# Patient Record
Sex: FEMALE | Race: WHITE | NOT HISPANIC OR LATINO | Employment: UNEMPLOYED | URBAN - METROPOLITAN AREA
[De-identification: names, ages, dates, MRNs, and addresses within clinical notes are randomized per-mention and may not be internally consistent; named-entity substitution may affect disease eponyms.]

---

## 2018-09-14 ENCOUNTER — HOSPITAL ENCOUNTER (EMERGENCY)
Facility: HOSPITAL | Age: 78
Discharge: NURSING, REHAB OR LONG-TERM CARE | End: 2018-09-14
Attending: EMERGENCY MEDICINE
Payer: MEDICARE

## 2018-09-14 VITALS
HEART RATE: 66 BPM | DIASTOLIC BLOOD PRESSURE: 58 MMHG | SYSTOLIC BLOOD PRESSURE: 106 MMHG | RESPIRATION RATE: 18 BRPM | WEIGHT: 173.81 LBS | BODY MASS INDEX: 24.88 KG/M2 | HEIGHT: 70 IN | OXYGEN SATURATION: 98 % | TEMPERATURE: 98 F

## 2018-09-14 DIAGNOSIS — M54.9 CHRONIC BILATERAL BACK PAIN, UNSPECIFIED BACK LOCATION: Primary | ICD-10-CM

## 2018-09-14 DIAGNOSIS — G89.29 CHRONIC BILATERAL BACK PAIN, UNSPECIFIED BACK LOCATION: Primary | ICD-10-CM

## 2018-09-14 DIAGNOSIS — R40.20 LOSS OF CONSCIOUSNESS: ICD-10-CM

## 2018-09-14 LAB
ALBUMIN SERPL BCP-MCNC: 3.2 G/DL
ALP SERPL-CCNC: 78 U/L
ALT SERPL W/O P-5'-P-CCNC: 12 U/L
ANION GAP SERPL CALC-SCNC: 7 MMOL/L
APTT BLDCRRT: 26.1 SEC
AST SERPL-CCNC: 19 U/L
BASOPHILS # BLD AUTO: 0.02 K/UL
BASOPHILS NFR BLD: 0.3 %
BILIRUB SERPL-MCNC: 0.6 MG/DL
BILIRUB UR QL STRIP: NEGATIVE
BUN SERPL-MCNC: 16 MG/DL
CALCIUM SERPL-MCNC: 9.9 MG/DL
CHLORIDE SERPL-SCNC: 100 MMOL/L
CK SERPL-CCNC: 24 U/L
CLARITY UR: CLEAR
CO2 SERPL-SCNC: 33 MMOL/L
COLOR UR: YELLOW
CREAT SERPL-MCNC: 1 MG/DL
DIFFERENTIAL METHOD: ABNORMAL
EOSINOPHIL # BLD AUTO: 0.2 K/UL
EOSINOPHIL NFR BLD: 3 %
ERYTHROCYTE [DISTWIDTH] IN BLOOD BY AUTOMATED COUNT: 14 %
EST. GFR  (AFRICAN AMERICAN): >60 ML/MIN/1.73 M^2
EST. GFR  (NON AFRICAN AMERICAN): 55 ML/MIN/1.73 M^2
GLUCOSE SERPL-MCNC: 142 MG/DL
GLUCOSE UR QL STRIP: NEGATIVE
HCT VFR BLD AUTO: 36.2 %
HGB BLD-MCNC: 11.3 G/DL
HGB UR QL STRIP: NEGATIVE
INR PPP: 1
KETONES UR QL STRIP: NEGATIVE
LEUKOCYTE ESTERASE UR QL STRIP: NEGATIVE
LYMPHOCYTES # BLD AUTO: 1.6 K/UL
LYMPHOCYTES NFR BLD: 26.7 %
MCH RBC QN AUTO: 29.7 PG
MCHC RBC AUTO-ENTMCNC: 31.2 G/DL
MCV RBC AUTO: 95 FL
MONOCYTES # BLD AUTO: 0.7 K/UL
MONOCYTES NFR BLD: 11.9 %
NEUTROPHILS # BLD AUTO: 3.5 K/UL
NEUTROPHILS NFR BLD: 57.8 %
NITRITE UR QL STRIP: NEGATIVE
PH UR STRIP: 7 [PH] (ref 5–8)
PLATELET # BLD AUTO: 182 K/UL
PMV BLD AUTO: 11.2 FL
POCT GLUCOSE: 142 MG/DL (ref 70–110)
POTASSIUM SERPL-SCNC: 4.7 MMOL/L
PROT SERPL-MCNC: 6.6 G/DL
PROT UR QL STRIP: ABNORMAL
PROTHROMBIN TIME: 10.9 SEC
RBC # BLD AUTO: 3.81 M/UL
SODIUM SERPL-SCNC: 140 MMOL/L
SP GR UR STRIP: 1.01 (ref 1–1.03)
TROPONIN I SERPL DL<=0.01 NG/ML-MCNC: 0.01 NG/ML
TROPONIN I SERPL DL<=0.01 NG/ML-MCNC: 0.01 NG/ML
URN SPEC COLLECT METH UR: ABNORMAL
UROBILINOGEN UR STRIP-ACNC: NEGATIVE EU/DL
WBC # BLD AUTO: 6.03 K/UL

## 2018-09-14 PROCEDURE — 85610 PROTHROMBIN TIME: CPT

## 2018-09-14 PROCEDURE — 85025 COMPLETE CBC W/AUTO DIFF WBC: CPT

## 2018-09-14 PROCEDURE — 84484 ASSAY OF TROPONIN QUANT: CPT

## 2018-09-14 PROCEDURE — 99284 EMERGENCY DEPT VISIT MOD MDM: CPT | Mod: 25

## 2018-09-14 PROCEDURE — 96375 TX/PRO/DX INJ NEW DRUG ADDON: CPT

## 2018-09-14 PROCEDURE — 96374 THER/PROPH/DIAG INJ IV PUSH: CPT

## 2018-09-14 PROCEDURE — 63600175 PHARM REV CODE 636 W HCPCS: Performed by: EMERGENCY MEDICINE

## 2018-09-14 PROCEDURE — 82962 GLUCOSE BLOOD TEST: CPT

## 2018-09-14 PROCEDURE — 85730 THROMBOPLASTIN TIME PARTIAL: CPT

## 2018-09-14 PROCEDURE — 25000003 PHARM REV CODE 250: Performed by: EMERGENCY MEDICINE

## 2018-09-14 PROCEDURE — 93005 ELECTROCARDIOGRAM TRACING: CPT

## 2018-09-14 PROCEDURE — 80053 COMPREHEN METABOLIC PANEL: CPT

## 2018-09-14 PROCEDURE — 93010 ELECTROCARDIOGRAM REPORT: CPT | Mod: ,,, | Performed by: INTERNAL MEDICINE

## 2018-09-14 PROCEDURE — 81003 URINALYSIS AUTO W/O SCOPE: CPT

## 2018-09-14 PROCEDURE — 82550 ASSAY OF CK (CPK): CPT

## 2018-09-14 RX ORDER — ONDANSETRON 2 MG/ML
4 INJECTION INTRAMUSCULAR; INTRAVENOUS
Status: COMPLETED | OUTPATIENT
Start: 2018-09-14 | End: 2018-09-14

## 2018-09-14 RX ORDER — TIZANIDINE 2 MG/1
4 TABLET ORAL 3 TIMES DAILY
COMMUNITY
End: 2018-10-27

## 2018-09-14 RX ORDER — LISINOPRIL 20 MG/1
20 TABLET ORAL DAILY
Status: ON HOLD | COMMUNITY
End: 2018-10-21 | Stop reason: HOSPADM

## 2018-09-14 RX ORDER — CARTEOLOL HYDROCHLORIDE 10 MG/ML
1 SOLUTION OPHTHALMIC 2 TIMES DAILY
COMMUNITY

## 2018-09-14 RX ORDER — TALC
POWDER (GRAM) TOPICAL NIGHTLY
COMMUNITY

## 2018-09-14 RX ORDER — CETIRIZINE HYDROCHLORIDE 10 MG/1
10 TABLET ORAL NIGHTLY
Status: ON HOLD | COMMUNITY
End: 2022-01-01

## 2018-09-14 RX ORDER — FLUTICASONE PROPIONATE 50 MCG
1 SPRAY, SUSPENSION (ML) NASAL NIGHTLY
Status: ON HOLD | COMMUNITY
End: 2022-01-01

## 2018-09-14 RX ORDER — RISPERIDONE 1 MG/1
1 TABLET ORAL NIGHTLY
COMMUNITY
End: 2019-04-13 | Stop reason: DRUGHIGH

## 2018-09-14 RX ORDER — PRAVASTATIN SODIUM 20 MG/1
20 TABLET ORAL NIGHTLY
COMMUNITY
End: 2018-09-21

## 2018-09-14 RX ORDER — DIVALPROEX SODIUM 500 MG/1
500 TABLET, DELAYED RELEASE ORAL NIGHTLY
COMMUNITY
End: 2019-04-13 | Stop reason: DRUGHIGH

## 2018-09-14 RX ORDER — KETOROLAC TROMETHAMINE 30 MG/ML
15 INJECTION, SOLUTION INTRAMUSCULAR; INTRAVENOUS
Status: COMPLETED | OUTPATIENT
Start: 2018-09-14 | End: 2018-09-14

## 2018-09-14 RX ORDER — ASPIRIN 81 MG/1
81 TABLET ORAL DAILY
COMMUNITY

## 2018-09-14 RX ORDER — PNV NO.95/FERROUS FUM/FOLIC AC 28MG-0.8MG
100 TABLET ORAL DAILY
COMMUNITY

## 2018-09-14 RX ORDER — MORPHINE SULFATE 4 MG/ML
4 INJECTION, SOLUTION INTRAMUSCULAR; INTRAVENOUS
Status: COMPLETED | OUTPATIENT
Start: 2018-09-14 | End: 2018-09-14

## 2018-09-14 RX ORDER — MEMANTINE HYDROCHLORIDE 5 MG/1
5 TABLET ORAL 2 TIMES DAILY
COMMUNITY

## 2018-09-14 RX ORDER — LATANOPROST 50 UG/ML
1 SOLUTION/ DROPS OPHTHALMIC NIGHTLY
COMMUNITY

## 2018-09-14 RX ORDER — HYDROCODONE BITARTRATE AND ACETAMINOPHEN 5; 325 MG/1; MG/1
1 TABLET ORAL 3 TIMES DAILY PRN
Status: ON HOLD | COMMUNITY
End: 2022-01-01

## 2018-09-14 RX ORDER — DOCUSATE SODIUM 100 MG/1
100 CAPSULE, LIQUID FILLED ORAL DAILY PRN
Status: ON HOLD | COMMUNITY
End: 2022-01-01

## 2018-09-14 RX ORDER — TRAZODONE HYDROCHLORIDE 50 MG/1
25 TABLET ORAL NIGHTLY
Status: ON HOLD | COMMUNITY
End: 2022-01-01 | Stop reason: SDUPTHER

## 2018-09-14 RX ORDER — DIVALPROEX SODIUM 250 MG/1
250 TABLET, DELAYED RELEASE ORAL 2 TIMES DAILY
Status: ON HOLD | COMMUNITY
End: 2022-01-01

## 2018-09-14 RX ORDER — MULTIVITAMIN
1 TABLET ORAL DAILY
COMMUNITY

## 2018-09-14 RX ORDER — ESTRADIOL 0.1 MG/G
1 CREAM VAGINAL
Status: ON HOLD | COMMUNITY
End: 2022-01-01

## 2018-09-14 RX ADMIN — MORPHINE SULFATE 4 MG: 4 INJECTION INTRAVENOUS at 01:09

## 2018-09-14 RX ADMIN — KETOROLAC TROMETHAMINE 15 MG: 30 INJECTION, SOLUTION INTRAMUSCULAR at 11:09

## 2018-09-14 RX ADMIN — SODIUM CHLORIDE 1000 ML: 0.9 INJECTION, SOLUTION INTRAVENOUS at 10:09

## 2018-09-14 RX ADMIN — ONDANSETRON 4 MG: 2 INJECTION INTRAMUSCULAR; INTRAVENOUS at 01:09

## 2018-09-14 NOTE — ED PROVIDER NOTES
Encounter Date: 9/14/2018       History     Chief Complaint   Patient presents with    Loss of Consciousness     pt lost consciousness while in the dining room at [facility]. CPR was started. When EMS arrived pt was arousable to tactile stimuli and had been incontinent of urine. C/o generalized body aches     78-year-old female brought to the emergency department by EMS.  [facility] reports that the patient was a code, as they could not find a pulse or respirations.  EMS reports that on their arrival, the patient was arousable to stimulation.  Patient is only complaining of her chronic neck and back pain.  She is unclear on the events of this morning.  She apparently passed out lost her pulse in the dining room this morning.  It is unclear the extent to which she actually received good CPR.  Nonetheless, she is awake and alert and oriented on arrival and only complains of her chronic aching neck and back pain, worse with movement without alleviating factors.          Review of patient's allergies indicates:  No Known Allergies  Past Medical History:   Diagnosis Date    Anemia     Bipolar disorder     Chronic pain     Dementia     Depression     Extrapyramidal and movement disorder     Glaucoma     Hyperlipidemia     Hypertension     Scabies 05/05/2017     No past surgical history on file.  No family history on file.  Social History     Tobacco Use    Smoking status: Not on file   Substance Use Topics    Alcohol use: Not on file    Drug use: Not on file     Review of Systems   Constitutional: Negative for chills, fatigue and fever.   HENT: Negative for congestion, sore throat and voice change.    Eyes: Negative for photophobia, pain and redness.   Respiratory: Negative for cough and choking.    Cardiovascular: Negative for chest pain, palpitations and leg swelling.   Gastrointestinal: Negative for abdominal pain, diarrhea, nausea and vomiting.   Genitourinary: Negative for dysuria,  frequency and urgency.   Musculoskeletal: Positive for back pain and neck pain. Negative for neck stiffness.   Neurological: Negative for seizures, speech difficulty, numbness and headaches.   All other systems reviewed and are negative.      Physical Exam     Initial Vitals [09/14/18 0941]   BP Pulse Resp Temp SpO2   (!) 86/48 61 15 97.9 °F (36.6 °C) 96 %      MAP       --         Physical Exam    Nursing note and vitals reviewed.  Constitutional: She appears well-developed and well-nourished. No distress.   HENT:   Head: Normocephalic and atraumatic.   Oropharynx clear; dry mucous membranes   Eyes: Conjunctivae and EOM are normal. Pupils are equal, round, and reactive to light.   Neck: Normal range of motion. Neck supple. No tracheal deviation present.   Cardiovascular: Normal rate, regular rhythm, normal heart sounds and intact distal pulses.   Pulmonary/Chest: Breath sounds normal. No respiratory distress. She has no wheezes. She has no rhonchi. She has no rales.   Abdominal: Soft. Bowel sounds are normal. She exhibits no distension. There is no tenderness.   Musculoskeletal: Normal range of motion. She exhibits tenderness (Diffuse neck and back tenderness, similar to baseline per patient, no point or bony tenderness). She exhibits no edema.   Neurological: She is alert and oriented to person, place, and time. She has normal strength. No cranial nerve deficit or sensory deficit. GCS score is 15. GCS eye subscore is 4. GCS verbal subscore is 5. GCS motor subscore is 6.   Skin: Skin is warm and dry. Capillary refill takes less than 2 seconds.         ED Course   Procedures  Labs Reviewed   CBC W/ AUTO DIFFERENTIAL - Abnormal; Notable for the following components:       Result Value    RBC 3.81 (*)     Hemoglobin 11.3 (*)     Hematocrit 36.2 (*)     MCHC 31.2 (*)     All other components within normal limits   POCT GLUCOSE - Abnormal; Notable for the following components:    POCT Glucose 142 (*)     All other  components within normal limits   COMPREHENSIVE METABOLIC PANEL   URINALYSIS   PROTIME-INR   APTT   TROPONIN I   CK   POCT GLUCOSE MONITORING CONTINUOUS     EKG Readings: (Independently Interpreted)   Initial Reading: No STEMI. Previous EKG Date: No prior for comparison. Rhythm: Normal Sinus Rhythm. Heart Rate: 61. Ectopy: No Ectopy. Conduction: Normal. ST Segments: Normal ST Segments. T Waves: Normal. Axis: Normal.         X-Rays:   Independently Interpreted Readings:   Chest X-Ray: Chest x-ray interpreted by radiologist and visualized by me:      Imaging Results          X-Ray Chest AP Portable (Final result)  Result time 09/14/18 11:04:31    Final result by Armando Holland MD (09/14/18 11:04:31)                 Impression:      Mild cardiomegaly and interstitial edema.      Electronically signed by: Armando Holland MD  Date:    09/14/2018  Time:    11:04             Narrative:    EXAMINATION:  XR CHEST AP PORTABLE    CLINICAL HISTORY:  Loss of consciousness;    TECHNIQUE:  Single frontal view of the chest was performed.    COMPARISON:  None    FINDINGS:  Cardiac silhouette slightly enlarged.  Mild interstitial prominence.  Low lung volumes.  No focal consolidation.  No effusion or pneumothorax.  No acute osseous findings                                Medical Decision Making:   Initial Assessment:   78-year-old female presents emergency department for cardiac arrest versus syncope  Differential Diagnosis:   ACS, dissection, pneumonia, sepsis, UTI, dehydration, electrolyte dyscrasias, arrhythmia  Independently Interpreted Test(s):   I have ordered and independently interpreted X-rays - see prior notes.  I have ordered and independently interpreted EKG Reading(s) - see prior notes  Clinical Tests:   Lab Tests: Reviewed       <> Summary of Lab: Benign  ED Management:  Patient given IV fluid, vital signs have improved.  Given some medicine for pain and reports improvement.  Her evaluation is much more consistent  with syncope.  Informed her of results as well as plan to discharge and patient is comfortable with discharge at this time.                      Clinical Impression:   The primary encounter diagnosis was Chronic bilateral back pain, unspecified back location. A diagnosis of Loss of consciousness was also pertinent to this visit.      Disposition:   Disposition: Discharged  Condition: Stable                        Barney Fields MD  09/14/18 5366

## 2018-09-14 NOTE — ED TRIAGE NOTES
Pt arrived via  EMS from [facility] after witnessed syncopal episode while eating.  CPR started by staff d/t inability to find pulse.  Upon EMS arrival pt alert and responsive to touch, was incontinent of urine during episode.

## 2018-09-20 ENCOUNTER — HOSPITAL ENCOUNTER (INPATIENT)
Facility: HOSPITAL | Age: 78
LOS: 3 days | Discharge: HOME | DRG: 871 | End: 2018-09-24
Attending: EMERGENCY MEDICINE | Admitting: HOSPITALIST
Payer: MEDICARE

## 2018-09-20 DIAGNOSIS — K80.20 CALCULUS OF GALLBLADDER WITHOUT CHOLECYSTITIS WITHOUT OBSTRUCTION: ICD-10-CM

## 2018-09-20 DIAGNOSIS — N12 PYELONEPHRITIS: ICD-10-CM

## 2018-09-20 DIAGNOSIS — A41.9 SEPSIS, DUE TO UNSPECIFIED ORGANISM: ICD-10-CM

## 2018-09-20 DIAGNOSIS — N39.0 URINARY TRACT INFECTION WITH HEMATURIA, SITE UNSPECIFIED: Primary | ICD-10-CM

## 2018-09-20 DIAGNOSIS — B96.20 E. COLI PYELONEPHRITIS: ICD-10-CM

## 2018-09-20 DIAGNOSIS — R41.82 MENTAL STATUS CHANGE: ICD-10-CM

## 2018-09-20 DIAGNOSIS — R31.9 URINARY TRACT INFECTION WITH HEMATURIA, SITE UNSPECIFIED: Primary | ICD-10-CM

## 2018-09-20 DIAGNOSIS — N12 E. COLI PYELONEPHRITIS: ICD-10-CM

## 2018-09-20 LAB
ALBUMIN SERPL BCP-MCNC: 3.1 G/DL
ALP SERPL-CCNC: 88 U/L
ALT SERPL W/O P-5'-P-CCNC: 18 U/L
ANION GAP SERPL CALC-SCNC: 11 MMOL/L
ANISOCYTOSIS BLD QL SMEAR: SLIGHT
AST SERPL-CCNC: 23 U/L
BACTERIA #/AREA URNS HPF: ABNORMAL /HPF
BASOPHILS # BLD AUTO: ABNORMAL K/UL
BASOPHILS NFR BLD: 0 %
BILIRUB SERPL-MCNC: 1 MG/DL
BILIRUB UR QL STRIP: NEGATIVE
BUN SERPL-MCNC: 26 MG/DL
CALCIUM SERPL-MCNC: 10.1 MG/DL
CHLORIDE SERPL-SCNC: 97 MMOL/L
CLARITY UR: ABNORMAL
CO2 SERPL-SCNC: 29 MMOL/L
COLOR UR: YELLOW
CREAT SERPL-MCNC: 1.2 MG/DL
DIFFERENTIAL METHOD: ABNORMAL
EOSINOPHIL # BLD AUTO: ABNORMAL K/UL
EOSINOPHIL NFR BLD: 2 %
ERYTHROCYTE [DISTWIDTH] IN BLOOD BY AUTOMATED COUNT: 13.9 %
EST. GFR  (AFRICAN AMERICAN): 51 ML/MIN/1.73 M^2
EST. GFR  (NON AFRICAN AMERICAN): 44 ML/MIN/1.73 M^2
GLUCOSE SERPL-MCNC: 129 MG/DL
GLUCOSE UR QL STRIP: NEGATIVE
HCT VFR BLD AUTO: 38.9 %
HGB BLD-MCNC: 12.6 G/DL
HGB UR QL STRIP: ABNORMAL
HYALINE CASTS #/AREA URNS LPF: 0 /LPF
KETONES UR QL STRIP: ABNORMAL
LACTATE SERPL-SCNC: 1.1 MMOL/L
LEUKOCYTE ESTERASE UR QL STRIP: ABNORMAL
LYMPHOCYTES # BLD AUTO: ABNORMAL K/UL
LYMPHOCYTES NFR BLD: 10 %
MCH RBC QN AUTO: 29.9 PG
MCHC RBC AUTO-ENTMCNC: 32.4 G/DL
MCV RBC AUTO: 92 FL
MICROSCOPIC COMMENT: ABNORMAL
MONOCYTES # BLD AUTO: ABNORMAL K/UL
MONOCYTES NFR BLD: 11 %
NEUTROPHILS NFR BLD: 66 %
NEUTS BAND NFR BLD MANUAL: 11 %
NITRITE UR QL STRIP: POSITIVE
PH UR STRIP: 6 [PH] (ref 5–8)
PLATELET # BLD AUTO: 215 K/UL
PLATELET BLD QL SMEAR: ABNORMAL
PMV BLD AUTO: 10.9 FL
POTASSIUM SERPL-SCNC: 4.2 MMOL/L
PROT SERPL-MCNC: 7.3 G/DL
PROT UR QL STRIP: ABNORMAL
RBC # BLD AUTO: 4.21 M/UL
RBC #/AREA URNS HPF: >100 /HPF (ref 0–4)
SODIUM SERPL-SCNC: 137 MMOL/L
SP GR UR STRIP: 1.02 (ref 1–1.03)
SQUAMOUS #/AREA URNS HPF: 2 /HPF
URN SPEC COLLECT METH UR: ABNORMAL
UROBILINOGEN UR STRIP-ACNC: NEGATIVE EU/DL
WBC # BLD AUTO: 15.53 K/UL
WBC #/AREA URNS HPF: 50 /HPF (ref 0–5)
WBC CLUMPS URNS QL MICRO: ABNORMAL

## 2018-09-20 PROCEDURE — 96375 TX/PRO/DX INJ NEW DRUG ADDON: CPT

## 2018-09-20 PROCEDURE — 80053 COMPREHEN METABOLIC PANEL: CPT

## 2018-09-20 PROCEDURE — 87077 CULTURE AEROBIC IDENTIFY: CPT | Mod: 59

## 2018-09-20 PROCEDURE — 87186 SC STD MICRODIL/AGAR DIL: CPT | Mod: 59

## 2018-09-20 PROCEDURE — 85027 COMPLETE CBC AUTOMATED: CPT

## 2018-09-20 PROCEDURE — 87040 BLOOD CULTURE FOR BACTERIA: CPT

## 2018-09-20 PROCEDURE — 85007 BL SMEAR W/DIFF WBC COUNT: CPT

## 2018-09-20 PROCEDURE — 63600175 PHARM REV CODE 636 W HCPCS: Performed by: EMERGENCY MEDICINE

## 2018-09-20 PROCEDURE — 96361 HYDRATE IV INFUSION ADD-ON: CPT

## 2018-09-20 PROCEDURE — 96365 THER/PROPH/DIAG IV INF INIT: CPT

## 2018-09-20 PROCEDURE — 87086 URINE CULTURE/COLONY COUNT: CPT

## 2018-09-20 PROCEDURE — 81000 URINALYSIS NONAUTO W/SCOPE: CPT

## 2018-09-20 PROCEDURE — 25000003 PHARM REV CODE 250: Performed by: EMERGENCY MEDICINE

## 2018-09-20 PROCEDURE — 25500020 PHARM REV CODE 255: Performed by: EMERGENCY MEDICINE

## 2018-09-20 PROCEDURE — 83605 ASSAY OF LACTIC ACID: CPT

## 2018-09-20 PROCEDURE — 87088 URINE BACTERIA CULTURE: CPT

## 2018-09-20 PROCEDURE — 99285 EMERGENCY DEPT VISIT HI MDM: CPT | Mod: 25

## 2018-09-20 RX ADMIN — SODIUM CHLORIDE 2355 ML: 0.9 INJECTION, SOLUTION INTRAVENOUS at 09:09

## 2018-09-20 RX ADMIN — IOHEXOL 75 ML: 350 INJECTION, SOLUTION INTRAVENOUS at 10:09

## 2018-09-20 RX ADMIN — CEFTRIAXONE 1 G: 1 INJECTION, SOLUTION INTRAVENOUS at 10:09

## 2018-09-21 PROBLEM — I10 ESSENTIAL HYPERTENSION: Status: ACTIVE | Noted: 2018-09-21

## 2018-09-21 PROBLEM — G89.29 CHRONIC PAIN: Status: ACTIVE | Noted: 2018-09-21

## 2018-09-21 PROBLEM — I10 ESSENTIAL HYPERTENSION: Chronic | Status: ACTIVE | Noted: 2018-09-21

## 2018-09-21 PROBLEM — F03.90 DEMENTIA WITHOUT BEHAVIORAL DISTURBANCE: Status: ACTIVE | Noted: 2018-09-21

## 2018-09-21 PROBLEM — F03.90 DEMENTIA WITHOUT BEHAVIORAL DISTURBANCE: Chronic | Status: ACTIVE | Noted: 2018-09-21

## 2018-09-21 PROBLEM — F32.9 MAJOR DEPRESSION: Status: ACTIVE | Noted: 2018-09-21

## 2018-09-21 PROBLEM — A41.9 SEVERE SEPSIS: Status: ACTIVE | Noted: 2018-09-21

## 2018-09-21 PROBLEM — F31.9 BIPOLAR AFFECTIVE DISORDER: Status: ACTIVE | Noted: 2018-09-21

## 2018-09-21 PROBLEM — F31.9 BIPOLAR AFFECTIVE DISORDER: Chronic | Status: ACTIVE | Noted: 2018-09-21

## 2018-09-21 PROBLEM — R78.81 GRAM-NEGATIVE BACTEREMIA: Status: ACTIVE | Noted: 2018-09-21

## 2018-09-21 PROBLEM — R65.20 SEVERE SEPSIS: Status: ACTIVE | Noted: 2018-09-21

## 2018-09-21 PROBLEM — R53.81 DEBILITY: Status: ACTIVE | Noted: 2018-09-21

## 2018-09-21 PROBLEM — Z99.3 WHEELCHAIR DEPENDENT: Chronic | Status: ACTIVE | Noted: 2018-09-21

## 2018-09-21 PROBLEM — G93.41 SEPTIC ENCEPHALOPATHY: Status: ACTIVE | Noted: 2018-09-21

## 2018-09-21 PROBLEM — N20.0 RIGHT NEPHROLITHIASIS: Status: ACTIVE | Noted: 2018-09-21

## 2018-09-21 PROBLEM — G89.29 CHRONIC PAIN: Chronic | Status: ACTIVE | Noted: 2018-09-21

## 2018-09-21 PROBLEM — N12 PYELONEPHRITIS: Status: ACTIVE | Noted: 2018-09-21

## 2018-09-21 LAB
ALLENS TEST: ABNORMAL
ANION GAP SERPL CALC-SCNC: 9 MMOL/L
ANISOCYTOSIS BLD QL SMEAR: SLIGHT
APTT BLDCRRT: 29.8 SEC
BASOPHILS # BLD AUTO: ABNORMAL K/UL
BASOPHILS NFR BLD: 0 %
BUN SERPL-MCNC: 22 MG/DL
CALCIUM SERPL-MCNC: 9 MG/DL
CHLORIDE SERPL-SCNC: 103 MMOL/L
CO2 SERPL-SCNC: 25 MMOL/L
CREAT SERPL-MCNC: 1 MG/DL
DELSYS: ABNORMAL
DIFFERENTIAL METHOD: ABNORMAL
EOSINOPHIL # BLD AUTO: ABNORMAL K/UL
EOSINOPHIL NFR BLD: 0 %
ERYTHROCYTE [DISTWIDTH] IN BLOOD BY AUTOMATED COUNT: 14.2 %
EST. GFR  (AFRICAN AMERICAN): >60 ML/MIN/1.73 M^2
EST. GFR  (NON AFRICAN AMERICAN): 55 ML/MIN/1.73 M^2
GLUCOSE SERPL-MCNC: 130 MG/DL
HCO3 UR-SCNC: 28.1 MMOL/L (ref 24–28)
HCT VFR BLD AUTO: 33.6 %
HGB BLD-MCNC: 10.9 G/DL
HYPOCHROMIA BLD QL SMEAR: ABNORMAL
INR PPP: 1.1
LACTATE SERPL-SCNC: 0.8 MMOL/L
LACTATE SERPL-SCNC: 1.3 MMOL/L
LYMPHOCYTES # BLD AUTO: ABNORMAL K/UL
LYMPHOCYTES NFR BLD: 12 %
MAGNESIUM SERPL-MCNC: 1.7 MG/DL
MCH RBC QN AUTO: 30.1 PG
MCHC RBC AUTO-ENTMCNC: 32.4 G/DL
MCV RBC AUTO: 93 FL
MONOCYTES # BLD AUTO: ABNORMAL K/UL
MONOCYTES NFR BLD: 8 %
NEUTROPHILS NFR BLD: 68 %
NEUTS BAND NFR BLD MANUAL: 12 %
PCO2 BLDA: 42.6 MMHG (ref 35–45)
PH SMN: 7.43 [PH] (ref 7.35–7.45)
PLATELET # BLD AUTO: 193 K/UL
PLATELET BLD QL SMEAR: ABNORMAL
PMV BLD AUTO: 10.5 FL
PO2 BLDA: 97 MMHG (ref 80–100)
POC BE: 4 MMOL/L
POC SATURATED O2: 98 % (ref 95–100)
POC TCO2: 29 MMOL/L (ref 23–27)
POCT GLUCOSE: 119 MG/DL (ref 70–110)
POCT GLUCOSE: 121 MG/DL (ref 70–110)
POTASSIUM SERPL-SCNC: 4.1 MMOL/L
PROTHROMBIN TIME: 11.7 SEC
RBC # BLD AUTO: 3.62 M/UL
SAMPLE: ABNORMAL
SITE: ABNORMAL
SODIUM SERPL-SCNC: 137 MMOL/L
WBC # BLD AUTO: 13.9 K/UL

## 2018-09-21 PROCEDURE — G8978 MOBILITY CURRENT STATUS: HCPCS | Mod: CM | Performed by: PHYSICAL THERAPIST

## 2018-09-21 PROCEDURE — 63600175 PHARM REV CODE 636 W HCPCS: Performed by: NURSE PRACTITIONER

## 2018-09-21 PROCEDURE — 97165 OT EVAL LOW COMPLEX 30 MIN: CPT

## 2018-09-21 PROCEDURE — 85027 COMPLETE CBC AUTOMATED: CPT

## 2018-09-21 PROCEDURE — 85730 THROMBOPLASTIN TIME PARTIAL: CPT

## 2018-09-21 PROCEDURE — 25000003 PHARM REV CODE 250: Performed by: NURSE PRACTITIONER

## 2018-09-21 PROCEDURE — 85007 BL SMEAR W/DIFF WBC COUNT: CPT

## 2018-09-21 PROCEDURE — 25000242 PHARM REV CODE 250 ALT 637 W/ HCPCS: Performed by: NURSE PRACTITIONER

## 2018-09-21 PROCEDURE — 11000001 HC ACUTE MED/SURG PRIVATE ROOM

## 2018-09-21 PROCEDURE — 82803 BLOOD GASES ANY COMBINATION: CPT

## 2018-09-21 PROCEDURE — 83735 ASSAY OF MAGNESIUM: CPT

## 2018-09-21 PROCEDURE — 27000221 HC OXYGEN, UP TO 24 HOURS

## 2018-09-21 PROCEDURE — 93005 ELECTROCARDIOGRAM TRACING: CPT

## 2018-09-21 PROCEDURE — 94761 N-INVAS EAR/PLS OXIMETRY MLT: CPT

## 2018-09-21 PROCEDURE — 80048 BASIC METABOLIC PNL TOTAL CA: CPT

## 2018-09-21 PROCEDURE — 85610 PROTHROMBIN TIME: CPT

## 2018-09-21 PROCEDURE — 36600 WITHDRAWAL OF ARTERIAL BLOOD: CPT

## 2018-09-21 PROCEDURE — 36415 COLL VENOUS BLD VENIPUNCTURE: CPT

## 2018-09-21 PROCEDURE — 83605 ASSAY OF LACTIC ACID: CPT | Mod: 91

## 2018-09-21 PROCEDURE — G8979 MOBILITY GOAL STATUS: HCPCS | Mod: CK | Performed by: PHYSICAL THERAPIST

## 2018-09-21 PROCEDURE — 97162 PT EVAL MOD COMPLEX 30 MIN: CPT | Performed by: PHYSICAL THERAPIST

## 2018-09-21 PROCEDURE — 25000003 PHARM REV CODE 250: Performed by: HOSPITALIST

## 2018-09-21 PROCEDURE — 97530 THERAPEUTIC ACTIVITIES: CPT | Performed by: PHYSICAL THERAPIST

## 2018-09-21 PROCEDURE — 83605 ASSAY OF LACTIC ACID: CPT

## 2018-09-21 RX ORDER — TIMOLOL MALEATE 5 MG/ML
1 SOLUTION/ DROPS OPHTHALMIC 2 TIMES DAILY
Status: DISCONTINUED | OUTPATIENT
Start: 2018-09-21 | End: 2018-09-24 | Stop reason: HOSPADM

## 2018-09-21 RX ORDER — ONDANSETRON 8 MG/1
8 TABLET, ORALLY DISINTEGRATING ORAL EVERY 8 HOURS PRN
Status: DISCONTINUED | OUTPATIENT
Start: 2018-09-21 | End: 2018-09-24 | Stop reason: HOSPADM

## 2018-09-21 RX ORDER — ASPIRIN 81 MG/1
81 TABLET ORAL DAILY
Status: DISCONTINUED | OUTPATIENT
Start: 2018-09-21 | End: 2018-09-24 | Stop reason: HOSPADM

## 2018-09-21 RX ORDER — LABETALOL HYDROCHLORIDE 5 MG/ML
10 INJECTION, SOLUTION INTRAVENOUS EVERY 4 HOURS PRN
Status: DISCONTINUED | OUTPATIENT
Start: 2018-09-21 | End: 2018-09-24 | Stop reason: HOSPADM

## 2018-09-21 RX ORDER — SODIUM CHLORIDE 0.9 % (FLUSH) 0.9 %
5 SYRINGE (ML) INJECTION
Status: DISCONTINUED | OUTPATIENT
Start: 2018-09-21 | End: 2018-09-24 | Stop reason: HOSPADM

## 2018-09-21 RX ORDER — SODIUM CHLORIDE 9 MG/ML
INJECTION, SOLUTION INTRAVENOUS CONTINUOUS
Status: ACTIVE | OUTPATIENT
Start: 2018-09-21 | End: 2018-09-22

## 2018-09-21 RX ORDER — LATANOPROST 50 UG/ML
1 SOLUTION/ DROPS OPHTHALMIC NIGHTLY
Status: DISCONTINUED | OUTPATIENT
Start: 2018-09-21 | End: 2018-09-24 | Stop reason: HOSPADM

## 2018-09-21 RX ORDER — MEMANTINE HYDROCHLORIDE 5 MG/1
5 TABLET ORAL DAILY
Status: DISCONTINUED | OUTPATIENT
Start: 2018-09-21 | End: 2018-09-21

## 2018-09-21 RX ORDER — CETIRIZINE HYDROCHLORIDE 10 MG/1
10 TABLET ORAL NIGHTLY
Status: DISCONTINUED | OUTPATIENT
Start: 2018-09-21 | End: 2018-09-21

## 2018-09-21 RX ORDER — ACETAMINOPHEN 325 MG/1
650 TABLET ORAL EVERY 4 HOURS PRN
Status: DISCONTINUED | OUTPATIENT
Start: 2018-09-21 | End: 2018-09-24 | Stop reason: HOSPADM

## 2018-09-21 RX ORDER — MEROPENEM AND SODIUM CHLORIDE 1 G/50ML
1 INJECTION, SOLUTION INTRAVENOUS
Status: DISCONTINUED | OUTPATIENT
Start: 2018-09-21 | End: 2018-09-21

## 2018-09-21 RX ORDER — METIPRANOLOL 3 MG/ML
1 SOLUTION/ DROPS OPHTHALMIC EVERY 12 HOURS
Status: DISCONTINUED | OUTPATIENT
Start: 2018-09-21 | End: 2018-09-21

## 2018-09-21 RX ORDER — DIVALPROEX SODIUM 500 MG/1
500 TABLET, DELAYED RELEASE ORAL NIGHTLY
Status: DISCONTINUED | OUTPATIENT
Start: 2018-09-21 | End: 2018-09-21

## 2018-09-21 RX ORDER — TIZANIDINE 4 MG/1
4 TABLET ORAL 3 TIMES DAILY
Status: DISCONTINUED | OUTPATIENT
Start: 2018-09-21 | End: 2018-09-21

## 2018-09-21 RX ORDER — HYDROCODONE BITARTRATE AND ACETAMINOPHEN 5; 325 MG/1; MG/1
1 TABLET ORAL EVERY 6 HOURS PRN
Status: DISCONTINUED | OUTPATIENT
Start: 2018-09-21 | End: 2018-09-24 | Stop reason: HOSPADM

## 2018-09-21 RX ORDER — ENOXAPARIN SODIUM 100 MG/ML
40 INJECTION SUBCUTANEOUS EVERY 24 HOURS
Status: DISCONTINUED | OUTPATIENT
Start: 2018-09-21 | End: 2018-09-24 | Stop reason: HOSPADM

## 2018-09-21 RX ORDER — FLUTICASONE PROPIONATE 50 MCG
1 SPRAY, SUSPENSION (ML) NASAL DAILY
Status: DISCONTINUED | OUTPATIENT
Start: 2018-09-21 | End: 2018-09-24 | Stop reason: HOSPADM

## 2018-09-21 RX ORDER — DIVALPROEX SODIUM 125 MG/1
250 TABLET, DELAYED RELEASE ORAL DAILY
Status: DISCONTINUED | OUTPATIENT
Start: 2018-09-21 | End: 2018-09-21

## 2018-09-21 RX ORDER — RAMELTEON 8 MG/1
8 TABLET ORAL NIGHTLY PRN
Status: DISCONTINUED | OUTPATIENT
Start: 2018-09-21 | End: 2018-09-24 | Stop reason: HOSPADM

## 2018-09-21 RX ORDER — POLYETHYLENE GLYCOL 3350 17 G/17G
17 POWDER, FOR SOLUTION ORAL DAILY
Status: DISCONTINUED | OUTPATIENT
Start: 2018-09-21 | End: 2018-09-21

## 2018-09-21 RX ORDER — RISPERIDONE 0.25 MG/1
1 TABLET ORAL 2 TIMES DAILY
Status: DISCONTINUED | OUTPATIENT
Start: 2018-09-21 | End: 2018-09-21

## 2018-09-21 RX ORDER — DOCUSATE SODIUM 100 MG/1
100 CAPSULE, LIQUID FILLED ORAL DAILY PRN
Status: DISCONTINUED | OUTPATIENT
Start: 2018-09-21 | End: 2018-09-24 | Stop reason: HOSPADM

## 2018-09-21 RX ADMIN — ACETAMINOPHEN 650 MG: 325 TABLET ORAL at 12:09

## 2018-09-21 RX ADMIN — LATANOPROST 1 DROP: 50 SOLUTION OPHTHALMIC at 08:09

## 2018-09-21 RX ADMIN — MEROPENEM 1 G: 1 INJECTION, POWDER, FOR SOLUTION INTRAVENOUS at 03:09

## 2018-09-21 RX ADMIN — ACETAMINOPHEN 650 MG: 325 TABLET ORAL at 01:09

## 2018-09-21 RX ADMIN — FLUTICASONE PROPIONATE 50 MCG: 50 SPRAY, METERED NASAL at 09:09

## 2018-09-21 RX ADMIN — ENOXAPARIN SODIUM 40 MG: 100 INJECTION SUBCUTANEOUS at 05:09

## 2018-09-21 RX ADMIN — ASPIRIN 81 MG: 81 TABLET, COATED ORAL at 09:09

## 2018-09-21 RX ADMIN — LABETALOL HYDROCHLORIDE 10 MG: 5 INJECTION, SOLUTION INTRAVENOUS at 01:09

## 2018-09-21 RX ADMIN — ACETAMINOPHEN 650 MG: 325 TABLET ORAL at 07:09

## 2018-09-21 RX ADMIN — HYDROCODONE BITARTRATE AND ACETAMINOPHEN 1 TABLET: 5; 325 TABLET ORAL at 05:09

## 2018-09-21 RX ADMIN — LATANOPROST 1 DROP: 50 SOLUTION OPHTHALMIC at 06:09

## 2018-09-21 RX ADMIN — TIMOLOL MALEATE 1 DROP: 5 SOLUTION OPHTHALMIC at 08:09

## 2018-09-21 RX ADMIN — SODIUM CHLORIDE: 0.9 INJECTION, SOLUTION INTRAVENOUS at 01:09

## 2018-09-21 RX ADMIN — TIMOLOL MALEATE 1 DROP: 5 SOLUTION OPHTHALMIC at 09:09

## 2018-09-21 NOTE — ASSESSMENT & PLAN NOTE
Right nephrolithiasis  Severe sepsis  Septic encephalopathy  Give 2 more liters of IV saline to help flush kidney stone.  Continue meropenem.  Follow up culture results.  Speech Therapy swallow evaluation.

## 2018-09-21 NOTE — ED NOTES
Pt presents to ED via EMS from [facility]. Reports pt was sent for evaluation of fever. Pt lethargic at this time, eyes closed, tachypnea. Moved pt to get chest x-ray, pt began moaning.

## 2018-09-21 NOTE — PT/OT/SLP EVAL
Physical Therapy Evaluation    Patient Name:  Austyn Pedersen   MRN:  [id number]    Recommendations:     Discharge Recommendations:  nursing facility, skilled, nursing facility, basic   Discharge Equipment Recommendations: none   Barriers to discharge: None    Assessment:     Austyn Pedersen is a 78 y.o. female admitted with a medical diagnosis of Pyelonephritis.  She presents with the following impairments/functional limitations:  weakness, impaired functional mobilty, impaired cardiopulmonary response to activity, impaired endurance, impaired self care skills, gait instability, decreased coordination, decreased lower extremity function, pain.    Rehab Prognosis:  Fair- ; patient would benefit from acute skilled PT services to address these deficits and reach maximum level of function.      Recent Surgery: * No surgery found *      Plan:     During this hospitalization, patient to be seen 5 x/week to address the above listed problems via gait training, therapeutic activities, therapeutic exercises, neuromuscular re-education  · Plan of Care Expires:  09/21/18   Plan of Care Reviewed with: patient    Subjective     Communicated with nurse prior to session.  Patient found supine, sleeping upon PT entry to room, agreeable to evaluation.      Chief Complaint: doesn't know what day it is  Patient comments/goals: none stated  Pain/Comfort:  · Pain Rating 1: (Pt reported pain everywhere, unable to state where or how much pain was present)  · Pain Addressed 1: Reposition, Cessation of Activity, Distraction  · Pain Rating Post-Intervention 1: (Pt not answering question upon lying down.)    Patients cultural, spiritual, Taoism conflicts given the current situation: none    Living Environment:  Pt was a resident at [facility].  Prior to admission, patients level of function was amb limited amount with RW, in w/c during day.  Patient has the following equipment: (Pt states she used a RW but sat in a w/c during the day.).  DME owned  (not currently used): none.  Upon discharge, patient will have assistance from [facility].    Objective:     Patient found with: telemetry, peripheral IV     General Precautions: Standard,     Orthopedic Precautions:    Braces:       Exams:  · Pt is not oriented to time, place or person  · RLE ROM: limited to 100 degrees knee flexion and 0 degrees DF  · RLE Strength: 1+ to 3/5  · LLE ROM: limited to 100 degrees knee flexion and 0 degrees DF  · LLE Strength: 1+ to 3/5    Functional Mobility:  · Bed Mobility:     · Rolling Left:  maximal assistance  · Rolling Right: maximal assistance  · Scooting: maximal assistance and of 2 persons  · Supine to Sit: maximal assistance  · Sit to Supine: maximal assistance  · Balance: Pt has F/F- static sitting balance    AM-PAC 6 CLICK MOBILITY  Total Score:10       Therapeutic Activities and Exercises:   Pt sat at EOB 8 minutes with supervision to mod assist of 1.    Patient left HOB elevated with all lines intact, call button in reach, bed alarm on, nurse notified and nursing assistant present.    GOALS:   Multidisciplinary Problems     Physical Therapy Goals        Problem: Physical Therapy Goal    Goal Priority Disciplines Outcome Goal Variances Interventions   Physical Therapy Goal     PT, PT/OT Ongoing (interventions implemented as appropriate)     Description:  1.  Supine to sit with CG  2.  Sit to supine with CG  3.  Roll bilaterally with Min assist of 1  4.  Sit at EOB 12 minutes with supervision                    History:     Past Medical History:   Diagnosis Date    Anemia     Bipolar disorder     Chronic pain     Dementia     Depression     Extrapyramidal and movement disorder     Glaucoma     Hyperlipidemia     Hypertension     Scabies 05/05/2017       History reviewed. No pertinent surgical history.    Clinical Decision Making:     History  Co-morbidities and personal factors that may impact the plan of care Examination  Body Structures and Functions, activity  limitations and participation restrictions that may impact the plan of care Clinical Presentation   Decision Making/ Complexity Score   Co-morbidities:   [] Time since onset of injury / illness / exacerbation  [x] Status of current condition  []Patient's cognitive status and safety concerns    [x] Multiple Medical Problems (see med hx)  Personal Factors:   [] Patient's age  [x] Prior Level of function   [] Patient's home situation (environment and family support)  [x] Patient's level of motivation  [] Expected progression of patient      HISTORY:(criteria)    [] 13334 - no personal factors/history    [] 90543 - has 1-2 personal factor/comorbidity     [x] 19551 - has >3 personal factor/comorbidity     Body Regions:  [] Objective examination findings  [] Head     []  Neck  [] Trunk   [x] Upper Extremity  [x] Lower Extremity    Body Systems:  [] For communication ability, affect, cognition, language, and learning style: the assessment of the ability to make needs known, consciousness, orientation (person, place, and time), expected emotional /behavioral responses, and learning preferences (eg, learning barriers, education  needs)  [x] For the neuromuscular system: a general assessment of gross coordinated movement (eg, balance, gait, locomotion, transfers, and transitions) and motor function  (motor control and motor learning)  [x] For the musculoskeletal system: the assessment of gross symmetry, gross range of motion, gross strength, height, and weight  [x] For the integumentary system: the assessment of pliability(texture), presence of scar formation, skin color, and skin integrity  [x] For cardiovascular/pulmonary system: the assessment of heart rate, respiratory rate, blood pressure, and edema     Activity limitations:    [] Patient's cognitive status and saf ety concerns          [x] Status of current condition      [] Weight bearing restriction  [] Cardiopulmunary Restriction    Participation Restrictions:   []  Goals and goal agreement with the patient     [x] Rehab potential (prognosis) and probable outcome      Examination of Body System: (criteria)    [] 02097 - addressing 1-2 elements    [x] 83643 - addressing a total of 3 or more elements     [] 83930 -  Addressing a total of 4 or more elements         Clinical Presentation: (criteria)  Evolving - 73164     On examination of body system using standardized tests and measures patient presents with 3 or more elements from any of the following: body structures and functions, activity limitations, and/or participation restrictions.  Leading to a clinical presentation that is considered evolving with changing characteristics                              Clinical Decision Making  (Eval Complexity):  Moderate - 51091     Time Tracking:     PT Received On: 09/21/18  PT Start Time: 0857     PT Stop Time: 0928  PT Total Time (min): 31 min     Billable Minutes: Evaluation 16 and Therapeutic Activity 15      mAanda Villareal, PT  09/21/2018

## 2018-09-21 NOTE — SUBJECTIVE & OBJECTIVE
Past Medical History:   Diagnosis Date    Anemia     Bipolar disorder     Chronic pain     Dementia     Depression     Extrapyramidal and movement disorder     Glaucoma     Hyperlipidemia     Hypertension     Scabies 05/05/2017       History reviewed. No pertinent surgical history.    Review of patient's allergies indicates:  No Known Allergies    No current facility-administered medications on file prior to encounter.      Current Outpatient Medications on File Prior to Encounter   Medication Sig    aspirin (ECOTRIN) 81 MG EC tablet Take 81 mg by mouth once daily.    calcium carbonate/vitamin D3 (CALCIUM 600 + D,3, ORAL) Take by mouth once daily.    carteolol (OCUPRESS) 1 % ophthalmic solution Place 1 drop into the right eye 2 (two) times daily.    cetirizine (ZYRTEC) 10 MG tablet Take 10 mg by mouth every evening.    cranberry fruit concentrate (AZO CRANBERRY ORAL) Take by mouth 3 (three) times daily.    cyanocobalamin (VITAMIN B-12) 100 MCG tablet Take 100 mcg by mouth once daily.    divalproex (DEPAKOTE) 250 MG EC tablet Take 250 mg by mouth once daily.    divalproex (DEPAKOTE) 500 MG TbEC Take 500 mg by mouth every evening.    docusate sodium (COLACE) 100 MG capsule Take 100 mg by mouth daily as needed for Constipation.    estradiol (ESTRACE) 0.01 % (0.1 mg/gram) vaginal cream Place 1 g vaginally twice a week. Monday and Thursday    fluticasone (FLONASE) 50 mcg/actuation nasal spray 1 spray by Each Nare route once daily.    HYDROcodone-acetaminophen (NORCO) 5-325 mg per tablet Take 1 tablet by mouth 3 (three) times daily as needed for Pain.    latanoprost 0.005 % ophthalmic solution Place 1 drop into the right eye every evening.    lisinopril (PRINIVIL,ZESTRIL) 20 MG tablet Take 20 mg by mouth once daily.    melatonin 3 mg Tab Take by mouth every evening.    memantine (NAMENDA) 5 MG Tab Take 5 mg by mouth once daily.    mirabegron (MYRBETRIQ) 50 mg Tb24 Take by mouth once daily.     multivitamin (ONE DAILY MULTIVITAMIN) per tablet Take 1 tablet by mouth once daily.    propranolol (INNOPRAN XL) 80 mg 24 hr capsule Take 80 mg by mouth once daily.    risperiDONE (RISPERDAL) 1 MG tablet Take 1 mg by mouth 2 (two) times daily.    tiZANidine (ZANAFLEX) 2 MG tablet Take 4 mg by mouth 3 (three) times daily.    traZODone (DESYREL) 50 MG tablet Take 25 mg by mouth every evening.    methylcellulose oral powder Take by mouth once daily. 30cc In orange juice daily    [DISCONTINUED] pravastatin (PRAVACHOL) 20 MG tablet Take 20 mg by mouth every evening.     Family History     None        Tobacco Use    Smoking status: Unknown If Ever Smoked   Substance and Sexual Activity    Alcohol use: Not on file    Drug use: Not on file    Sexual activity: Not on file     Review of Systems   Constitutional: Positive for chills and fever. Negative for diaphoresis.   HENT: Negative for sore throat and trouble swallowing.    Eyes: Negative for photophobia and visual disturbance.   Respiratory: Negative for cough, shortness of breath and wheezing.    Cardiovascular: Negative for chest pain and palpitations.   Gastrointestinal: Positive for abdominal pain. Negative for constipation, diarrhea, nausea and vomiting.   Endocrine: Negative for polydipsia and polyphagia.   Genitourinary: Positive for dysuria. Negative for decreased urine volume, hematuria and urgency.   Musculoskeletal: Positive for arthralgias and back pain. Negative for joint swelling, neck pain and neck stiffness.   Neurological: Negative for weakness, numbness and headaches.   Psychiatric/Behavioral: Negative for agitation, dysphoric mood and suicidal ideas.     Objective:     Vital Signs (Most Recent):  Temp: 99.8 °F (37.7 °C) (09/21/18 0054)  Pulse: (!) 118 (09/20/18 2315)  Resp: (!) 24 (09/20/18 2315)  BP: (!) 157/77 (09/20/18 2315)  SpO2: 97 % (09/20/18 2315) Vital Signs (24h Range):  Temp:  [99.8 °F (37.7 °C)-102.3 °F (39.1 °C)] 99.8 °F (37.7  °C)  Pulse:  [113-118] 118  Resp:  [20-24] 24  SpO2:  [90 %-97 %] 97 %  BP: (126-157)/(58-77) 157/77     Weight: 78.5 kg (173 lb)  Body mass index is 24.82 kg/m².    Physical Exam   Constitutional: She appears well-developed and well-nourished. She appears distressed. Nasal cannula in place.   HENT:   Head: Normocephalic and atraumatic.   Mouth/Throat: Oropharynx is clear and moist. No oropharyngeal exudate.   Eyes: Conjunctivae are normal. Pupils are equal, round, and reactive to light. No scleral icterus.   Neck: Neck supple.   Cardiovascular: Regular rhythm, normal heart sounds and intact distal pulses. Tachycardia present. Exam reveals no gallop and no friction rub.   No murmur heard.  Pulmonary/Chest: Effort normal and breath sounds normal. No respiratory distress. She has no wheezes. She has no rales.   Abdominal: Soft. She exhibits distension. Bowel sounds are decreased. There is no tenderness. There is no rebound and no guarding.   Musculoskeletal: She exhibits no edema, tenderness or deformity.   Neurological: She is alert. She exhibits normal muscle tone.   Oriented to Person, not place and time.   Skin: Skin is warm and dry. Capillary refill takes less than 2 seconds. No rash noted. She is not diaphoretic.   Psychiatric: She has a normal mood and affect. Her behavior is normal.   Nursing note and vitals reviewed.        CRANIAL NERVES     CN III, IV, VI   Pupils are equal, round, and reactive to light.       Significant Labs:   CBC:   Recent Labs   Lab  09/20/18 2102   WBC  15.53*   HGB  12.6   HCT  38.9   PLT  215     CMP:   Recent Labs   Lab  09/20/18 2102   NA  137   K  4.2   CL  97   CO2  29   GLU  129*   BUN  26*   CREATININE  1.2   CALCIUM  10.1   PROT  7.3   ALBUMIN  3.1*   BILITOT  1.0   ALKPHOS  88   AST  23   ALT  18   ANIONGAP  11   EGFRNONAA  44*     Lactic Acid:   Recent Labs   Lab  09/20/18 2102   LACTATE  1.1     Urine Studies:   Recent Labs   Lab  09/20/18 2114   COLORU  Yellow    APPEARANCEUA  Hazy*   PHUR  6.0   SPECGRAV  1.025   PROTEINUA  3+*   GLUCUA  Negative   KETONESU  Trace*   BILIRUBINUA  Negative   OCCULTUA  3+*   NITRITE  Positive*   UROBILINOGEN  Negative   LEUKOCYTESUR  1+*   RBCUA  >100*   WBCUA  50*   BACTERIA  Many*   SQUAMEPITHEL  2   HYALINECASTS  0     All pertinent labs within the past 24 hours have been reviewed.    Significant Imaging: I have reviewed all pertinent imaging results/findings within the past 24 hours.   Imaging Results          CT Abdomen Pelvis With Contrast (Final result)  Result time 09/20/18 23:04:03    Final result by Barrera Pierce MD (09/20/18 23:04:03)                 Impression:      1.  Abnormal heterogeneous and diminished enhancement of the right kidney relative to the left kidney with associated urothelial enhancement and moderate degree of perinephric inflammatory change.  Findings are concerning for right-sided pyelonephritis.    2.  Punctate nonobstructing right lower pole calculus, however no definite obstructing ureteral calculus identified.    3.  Cholelithiasis without convincing CT evidence of acute cholecystitis.    4.  Mild anterior wedge compression deformities of the T8, T9, and L1 vertebral bodies of uncertain chronicity.    5.  Additional incidental findings as above.      Electronically signed by: Barrera Pierce MD  Date:    09/20/2018  Time:    23:04             Narrative:    EXAMINATION:  CT ABDOMEN PELVIS WITH CONTRAST    CLINICAL HISTORY:  Abd pain;    TECHNIQUE:  Low dose axial images, sagittal and coronal reformations were obtained from the lung bases to the pubic symphysis following the IV administration of 75 mL of Omnipaque 350 and the oral administration of 30 mL of Omnipaque 350.    COMPARISON:  None.    FINDINGS:  There is bibasilar atelectasis.  There are scattered coronary artery calcifications.  No significant pericardial effusion.    The liver is normal in size and attenuation with no focal hepatic  abnormality.  There is a 2.1 cm calcified stone within the gallbladder lumen.  No significant gallbladder wall thickening is appreciated.  There is no intra-or extrahepatic biliary ductal dilatation.    The stomach, spleen, pancreas, and adrenal glands are unremarkable.    The right kidney demonstrates heterogeneous and diminished enhancement relative to the left kidney with reduced excretion of contrast material relative to the left kidney.  There is a moderate degree of perinephric inflammatory change with apparent urothelial enhancement.  Findings are concerning for right-sided pyelonephritis.  There is a punctate nonobstructing right lower pole calculus, however no definite obstructing ureteral calculus is identified.  There is a subcentimeter right renal hypodensity which is too small to accurately characterize.  The left kidney demonstrates appropriate enhancement and excretion of IV contrast material.  There is no left-sided hydronephrosis.  Urinary bladder appears within normal limits.  The uterus demonstrates a slightly lobular contour with dystrophic calcifications possibly relating to uterine fibroids.  No significant free fluid within the pelvis.    The abdominal aorta is normal in course and caliber with significant atherosclerotic calcification along its course.    The visualized loops of small and large bowel show no evidence of obstruction or inflammation.  The appendix appears normal in caliber.  There is scattered retained stool throughout the colon which may relate to mild constipation.  There is no free intraperitoneal air or portal venous gas.    There are mild anterior wedge compression deformities of the T8, T9, and L1 vertebral bodies of uncertain chronicity.  There are degenerative changes of the spine.  The extraperitoneal soft tissues are unremarkable.                               X-Ray Chest AP Portable (Final result)  Result time 09/20/18 21:06:51    Final result by Evin Domingo MD  (09/20/18 21:06:51)                 Impression:      No airspace opacity.    Stable appearance of borderline cardiomegaly with mild pulmonary vascular congestion.      Electronically signed by: Evin Domingo MD  Date:    09/20/2018  Time:    21:06             Narrative:    EXAMINATION:  XR CHEST AP PORTABLE    CLINICAL HISTORY:  Sepsis.    TECHNIQUE:  Single frontal view of the chest was performed.    COMPARISON:  09/14/2018.    FINDINGS:  The trachea is unremarkable.  The cardiomediastinal silhouette is prominent.  The hemidiaphragms are unremarkable.  There is no evidence of free air beneath the hemidiaphragms.  There are no pleural effusions.  There is no evidence of a pneumothorax.  There is no evidence of pneumomediastinum.  No airspace opacities are present.  There is stable appearance of minimal increased attenuation of the pulmonary interstitium.  There are degenerative changes in the osseous structures.  The subcutaneous tissues are unremarkable.

## 2018-09-21 NOTE — ED NOTES
Patient on cardiac monitor, automatic blood pressure cuff and pulse oximeter. X-ray, lab, and respiratory at BS.

## 2018-09-21 NOTE — ED PROVIDER NOTES
Encounter Date: 9/20/2018    SCRIBE #1 NOTE: I, Yoli Menezes, am scribing for, and in the presence of,  Dr. Akbar. I have scribed the entire note.       History     Chief Complaint   Patient presents with    Fever     accompanied by generalized body aches, lethargy and AMS x2 hours     Time seen by provider: 9:31 PM    This is a 78 y.o. female who presents complaining of generalized body aches since this morning. Patient arrived via EMS from living facility for evaluation after febrile temperatures reported at 102. Patient reports generalized body aches, abdominal pain, and dysuria. Se denies any nausea, vomiting, cough, SOB, diarrhea, or any other concerning symptoms.       The history is provided by the patient.     Review of patient's allergies indicates:  No Known Allergies  Past Medical History:   Diagnosis Date    Anemia     Bipolar disorder     Chronic pain     Dementia     Depression     Extrapyramidal and movement disorder     Glaucoma     Hyperlipidemia     Hypertension     Scabies 05/05/2017     History reviewed. No pertinent surgical history.  History reviewed. No pertinent family history.  Social History     Tobacco Use    Smoking status: Unknown If Ever Smoked   Substance Use Topics    Alcohol use: Not on file    Drug use: Not on file     Review of Systems   Constitutional: Positive for fever. Negative for chills.   HENT: Negative for congestion, rhinorrhea and sore throat.    Eyes: Negative for redness and visual disturbance.   Respiratory: Negative for cough, shortness of breath and wheezing.    Cardiovascular: Negative for chest pain and palpitations.   Gastrointestinal: Positive for abdominal pain. Negative for diarrhea, nausea and vomiting.   Genitourinary: Negative for dysuria and hematuria.   Musculoskeletal: Negative for back pain, myalgias and neck pain.   Skin: Negative for rash.   Neurological: Negative for dizziness, weakness and light-headedness.   Psychiatric/Behavioral:  Negative for confusion.       Physical Exam     Initial Vitals [09/20/18 2029]   BP Pulse Resp Temp SpO2   (!) 126/58 (!) 113 20 (!) 102.3 °F (39.1 °C) (!) 90 %      MAP       --         Physical Exam    Nursing note and vitals reviewed.  Constitutional: She appears distressed.   Ill appearing and distressed    HENT:   Head: Normocephalic and atraumatic.   Dry mucous membranes   Cardiovascular: Normal heart sounds and intact distal pulses.   Tachycardia   Systolic murmur   Pulmonary/Chest: No respiratory distress.   Tachypnea   Abdominal: Soft. She exhibits no distension. There is tenderness. There is no rebound and no guarding.   Abdominal tenderness in RUQ and right flank   Neurological: She is alert.   Alert and oriented to self only  No focal defecits   Skin:   Warm and moist   Psychiatric:   Seems confused         ED Course   Procedures  Labs Reviewed   CBC W/ AUTO DIFFERENTIAL - Abnormal; Notable for the following components:       Result Value    WBC 15.53 (*)     Lymph% 10.0 (*)     All other components within normal limits   COMPREHENSIVE METABOLIC PANEL - Abnormal; Notable for the following components:    Glucose 129 (*)     BUN, Bld 26 (*)     Albumin 3.1 (*)     eGFR if  51 (*)     eGFR if non  44 (*)     All other components within normal limits   URINALYSIS, REFLEX TO URINE CULTURE - Abnormal; Notable for the following components:    Appearance, UA Hazy (*)     Protein, UA 3+ (*)     Ketones, UA Trace (*)     Occult Blood UA 3+ (*)     Nitrite, UA Positive (*)     Leukocytes, UA 1+ (*)     All other components within normal limits    Narrative:     Preferred Collection Type->Urine, Clean Catch   URINALYSIS MICROSCOPIC - Abnormal; Notable for the following components:    RBC, UA >100 (*)     WBC, UA 50 (*)     WBC Clumps, UA Few (*)     Bacteria, UA Many (*)     All other components within normal limits    Narrative:     Preferred Collection Type->Urine, Clean Catch    LACTIC ACID, PLASMA   LACTIC ACID, PLASMA        ECG Results    None       Imaging Results          CT Abdomen Pelvis With Contrast (Final result)  Result time 09/20/18 23:04:03    Final result by Barrera Pierce MD (09/20/18 23:04:03)                 Impression:      1.  Abnormal heterogeneous and diminished enhancement of the right kidney relative to the left kidney with associated urothelial enhancement and moderate degree of perinephric inflammatory change.  Findings are concerning for right-sided pyelonephritis.    2.  Punctate nonobstructing right lower pole calculus, however no definite obstructing ureteral calculus identified.    3.  Cholelithiasis without convincing CT evidence of acute cholecystitis.    4.  Mild anterior wedge compression deformities of the T8, T9, and L1 vertebral bodies of uncertain chronicity.    5.  Additional incidental findings as above.      Electronically signed by: Barrera Pierce MD  Date:    09/20/2018  Time:    23:04             Narrative:    EXAMINATION:  CT ABDOMEN PELVIS WITH CONTRAST    CLINICAL HISTORY:  Abd pain;    TECHNIQUE:  Low dose axial images, sagittal and coronal reformations were obtained from the lung bases to the pubic symphysis following the IV administration of 75 mL of Omnipaque 350 and the oral administration of 30 mL of Omnipaque 350.    COMPARISON:  None.    FINDINGS:  There is bibasilar atelectasis.  There are scattered coronary artery calcifications.  No significant pericardial effusion.    The liver is normal in size and attenuation with no focal hepatic abnormality.  There is a 2.1 cm calcified stone within the gallbladder lumen.  No significant gallbladder wall thickening is appreciated.  There is no intra-or extrahepatic biliary ductal dilatation.    The stomach, spleen, pancreas, and adrenal glands are unremarkable.    The right kidney demonstrates heterogeneous and diminished enhancement relative to the left kidney with reduced excretion of  contrast material relative to the left kidney.  There is a moderate degree of perinephric inflammatory change with apparent urothelial enhancement.  Findings are concerning for right-sided pyelonephritis.  There is a punctate nonobstructing right lower pole calculus, however no definite obstructing ureteral calculus is identified.  There is a subcentimeter right renal hypodensity which is too small to accurately characterize.  The left kidney demonstrates appropriate enhancement and excretion of IV contrast material.  There is no left-sided hydronephrosis.  Urinary bladder appears within normal limits.  The uterus demonstrates a slightly lobular contour with dystrophic calcifications possibly relating to uterine fibroids.  No significant free fluid within the pelvis.    The abdominal aorta is normal in course and caliber with significant atherosclerotic calcification along its course.    The visualized loops of small and large bowel show no evidence of obstruction or inflammation.  The appendix appears normal in caliber.  There is scattered retained stool throughout the colon which may relate to mild constipation.  There is no free intraperitoneal air or portal venous gas.    There are mild anterior wedge compression deformities of the T8, T9, and L1 vertebral bodies of uncertain chronicity.  There are degenerative changes of the spine.  The extraperitoneal soft tissues are unremarkable.                               X-Ray Chest AP Portable (Final result)  Result time 09/20/18 21:06:51    Final result by Evin Domingo MD (09/20/18 21:06:51)                 Impression:      No airspace opacity.    Stable appearance of borderline cardiomegaly with mild pulmonary vascular congestion.      Electronically signed by: Evin Domingo MD  Date:    09/20/2018  Time:    21:06             Narrative:    EXAMINATION:  XR CHEST AP PORTABLE    CLINICAL HISTORY:  Sepsis.    TECHNIQUE:  Single frontal view of the chest was  performed.    COMPARISON:  09/14/2018.    FINDINGS:  The trachea is unremarkable.  The cardiomediastinal silhouette is prominent.  The hemidiaphragms are unremarkable.  There is no evidence of free air beneath the hemidiaphragms.  There are no pleural effusions.  There is no evidence of a pneumothorax.  There is no evidence of pneumomediastinum.  No airspace opacities are present.  There is stable appearance of minimal increased attenuation of the pulmonary interstitium.  There are degenerative changes in the osseous structures.  The subcutaneous tissues are unremarkable.                                 Medical Decision Making:   Initial Assessment:   Patient presents with fever and is tachycardic, concerning for sepsis. Initiated sepsis protocol. Possible sources include pneumonia, cholecystitis, and pyelonephritis.     Clinical Tests:   Lab Tests: Ordered and Reviewed  Radiological Study: Reviewed and Ordered  ED Management:  9:40 PM   Limited bedside US of the RUQ. Inconclusive for cholecystitis. Will obtain CT scan.       10:05 PM   Pt has nitirite positive UTI. Lactate is normal at 1.1mmol/L. I will give IV Rocpehin. I am awaiting CT scan.    11:28 PM  Discussed case with Internal Medicine.    I reviewed the CT scan. Findings suggestive of pyelonephritis, and cholelithiasis without cholecystitis.                         Clinical Impression:     1. Urinary tract infection with hematuria, site unspecified    2. Sepsis, due to unspecified organism    3. Pyelonephritis    4. Calculus of gallbladder without cholecystitis without obstruction    5. Mental status change              I, Dr. Laith Akbar, personally performed the services described in this documentation. All medical record entries made by the scribe were at my direction and in my presence.  I have reviewed the chart and agree that the record reflects my personal performance and is accurate and complete. Laith Akbar MD.  1:01 AM  09/24/2018                   Escobar Akbar MD  09/24/18 0100

## 2018-09-21 NOTE — PT/OT/SLP EVAL
"Occupational Therapy   Evaluation    Name: Austyn Pedersen  MRN: [id number]  Admitting Diagnosis:  Pyelonephritis      Recommendations:     Discharge Recommendations: nursing facility, basic  Discharge Equipment Recommendations:  none  Barriers to discharge:  None    History:     Occupational Profile:  Living Environment: Pt was a resident at [facility]  Previous level of function: per PT eval, patients level of function was amb limited amount with RW, in w/c during day.  Patient has the following equipment: (Pt states she used a RW but sat in a w/c during the day.).    Roles and Routines: unknown-pt unreliable historian  Equipment Used at Home:  wheelchair, walker, rolling  Assistance upon Discharge: staff at NH    Past Medical History:   Diagnosis Date    Anemia     Bipolar disorder     Chronic pain     Dementia     Depression     Extrapyramidal and movement disorder     Glaucoma     Hyperlipidemia     Hypertension     Scabies 05/05/2017       History reviewed. No pertinent surgical history.    Subjective     Chief Complaint: " hurt all over"  Patient/Family Comments/goals: none    Pain/Comfort:  · Pain Rating 1: (I hurt all over)  · Location - Side 1: Bilateral  · Location - Orientation 1: generalized  · Location 1: (All over per pt report)  · Pain Addressed 1: Nurse notified, Cessation of Activity, Reposition  · Pain Rating Post-Intervention 1: (not rated)    Patients cultural, spiritual, Pentec[employer]alism conflicts given the current situation: na    Objective:     Communicated with: nurse prior to session.  Patient found all lines intact, call button in reach, bed alarm on and nurse notified and bed alarm, telemetry upon OT entry to room.    General Precautions: Standard, fall   Orthopedic Precautions:N/A   Braces: N/A     Occupational Performance:    Bed Mobility:    · Patient completed Rolling/Turning to Left with  dependent  · Patient completed Rolling/Turning to Right with dependent  · Patient completed " Scooting/Bridging with dependent x 2  ·     ·   · Functional Mobility: unable    Activities of Daily Living:  · Feeding:  total assistance grasped cup but poor iniitation to mouth; coughed on liguids; nurse present and will consult ST for michelle   · Grooming: dependence placed wet cloth in pt 's hand's but no initiation  · Lower Body Dressing: dependence bed level  · Toileting: dependence bed level    Cognitive/Visual Perceptual:  Cognitive/Psychosocial Skills:     -       Oriented to: Person   -       Follows Commands/attention:Inattentive and Follows one-step commands 15%-squeeze my hand  -       Communication: dystonia, not fluent but did utter some clear words  -       Memory: Impaired STM, Impaired LTM, Poor immediate recall and stated she lived with her girlfriend but lives in NH  -       Safety awareness/insight to disability: impaired   -       Mood/Affect/Coping skills/emotional control: Flat affect and lethargic  Visual/Perceptual:      - no defictis noted other than pt keeps eyes closed and mod vc to keep open    Physical Exam:  Balance:    -       sitting:  NT  Postural examination/scapula alignment:    -       NT  Motor Planning:    -       poor  Dominant hand:    -       right  Upper Extremity Range of Motion:     -       Right Upper Extremity: rigid tone and no AROM noted against gravity; AAROM/PROM  WFL; increased resistance and stiffnes in shoulders ~ 75-90 degrees and elbows .  -       Left Upper Extremity: Deficits: same as above . .  Upper Extremity Strength:    -       Right Upper Extremity: No AROM against gravity; NT  -       Left Upper Extremity: same .   Strength:    -       Right Upper Extremity: Deficits: 2+/5   -       Left Upper Extremity: Deficits: 2+/5 .  Neurological:    -       Rigid tone through and tremors    AMPAC 6 Click ADL:  AMPAC Total Score: 6    Treatment & Education:  PROM and stretch BUE shld , elbows x 5 reps shld abd/add/flex/ext/elbow flex/ext  Education:    Patient  "left HOB elevated with all lines intact, call button in reach, bed alarm on and nurse notified    Assessment:     Austyn Pedersen is a 78 y.o. female with a medical diagnosis of Pyelonephritis.  She presents with the following performance deficits affecting function: weakness, impaired self care skills, impaired balance, decreased coordination, decreased safety awareness, decreased ROM, impaired joint extensibility, decreased upper extremity function, decreased lower extremity function, abnormal tone, gait instability, impaired endurance, impaired functional mobilty.      Rehab Prognosis: fair; patient would benefit from acute skilled OT services to address these deficits and reach maximum level of function.         Clinical Decision Makin.  OT Low:  "Pt evaluation falls under low complexity for evaluation coding due to performance deficits noted in 1-3 areas as stated above and 0 co-morbities affecting current functional status. Data obtained from problem focused assessments. No modifications or assistance was required for completion of evaluation. Only brief occupational profile and history review completed."     Plan:     Patient to be seen 5 x/week to address the above listed problems via self-care/home management, neuromuscular re-education, therapeutic activities, therapeutic exercises  · Plan of Care Expires: 10/21/18  · Plan of Care Reviewed with: patient    This Plan of care has been discussed with the patient who was involved in its development and understands and is in agreement with the identified goals and treatment plan    GOALS:   Multidisciplinary Problems     Occupational Therapy Goals        Problem: Occupational Therapy Goal    Goal Priority Disciplines Outcome Interventions   Occupational Therapy Goal     OT, PT/OT Ongoing (interventions implemented as appropriate)    Description:  Goals to be met by: 10/20/2018    Patient will increase functional independence with ADLs by " performing:    Feeding with Supervision.  Grooming while seated with Minimal Assistance.  Supine to sit with Maximum Assistance.  Stand pivot transfers with Maximum Assistance.  Increased functional strength to WFL for BUE.  Upper extremity exercise program 10 eps per handout, with supervision.                      Time Tracking:     OT Date of Treatment: 09/21/18  OT Start Time: 1000  OT Stop Time: 1010  OT Total Time (min): 10 min    Billable Minutes:Evaluation 10  Total Time 10    Georgie Portillo OT  9/21/2018

## 2018-09-21 NOTE — PLAN OF CARE
Problem: Patient Care Overview  Goal: Plan of Care Review  Outcome: Ongoing (interventions implemented as appropriate)  Patient came from ED via stretcher.  Appears lethargic and sleepy since patient admitted to the floor.  Responsive to pain, oriented to self and place at this moment.  Code stroke cleared with the patient.  Unable to ask and assess admission questions due to patient's status at this moment, will refer to oncoming nurse.  On continuous telemetry monitor, ST to NSR with HR in 90s-100s.  No ectopy noted.  On 2L NC.  Fall precaution initiated.  Bed locked, alarm on, in the lowest position, side rails up x2, non-skid socks applied, and call light within reach.  Will continue to monitor.

## 2018-09-21 NOTE — HOSPITAL COURSE
She was put on meropenem.  Urine culture was checked to help adjust antibiotic.  Temperatures and WBC count decreased.  She had encephalopathy with difficulty swallowing.  Her blood cultures grew the bacteria causing her pyelonephritis.  Sepsis resolved.  E coli was susceptible to all antibiotics tested, so meropenem was changed to ciprofloxacin, and will complete a 14 day antibiotic course.  She was discharged back to [facility] on 9/24/18.

## 2018-09-21 NOTE — SUBJECTIVE & OBJECTIVE
Interval History: Reports pain all over.    Review of Systems   Unable to perform ROS: Mental status change     Objective:     Vital Signs (Most Recent):  Temp: 99.1 °F (37.3 °C) (09/21/18 1148)  Pulse: 102 (09/21/18 1148)  Resp: 14 (09/21/18 1148)  BP: (!) 180/73 (09/21/18 1148)  SpO2: 99 % (09/21/18 0737) Vital Signs (24h Range):  Temp:  [99.1 °F (37.3 °C)-102.3 °F (39.1 °C)] 99.1 °F (37.3 °C)  Pulse:  [] 102  Resp:  [14-31] 14  SpO2:  [90 %-99 %] 99 %  BP: (124-211)/(58-81) 180/73     Weight: 82.8 kg (182 lb 8.7 oz)  Body mass index is 26.19 kg/m².    Intake/Output Summary (Last 24 hours) at 9/21/2018 1203  Last data filed at 9/21/2018 0059  Gross per 24 hour   Intake 2455 ml   Output --   Net 2455 ml      Physical Exam   Constitutional: She appears well-developed. No distress.   shivering   Pulmonary/Chest: Effort normal. No respiratory distress.   Neurological: She is alert. She displays no seizure activity.   Nursing note and vitals reviewed.      Significant Labs: All pertinent labs within the past 24 hours have been reviewed.    Significant Imaging: I have reviewed all pertinent imaging results/findings within the past 24 hours.   X-Ray Chest AP Portable 9/20/18: FINDINGS:  The trachea is unremarkable.  The cardiomediastinal silhouette is prominent.  The hemidiaphragms are unremarkable.  There is no evidence of free air beneath the hemidiaphragms.  There are no pleural effusions.  There is no evidence of a pneumothorax.  There is no evidence of pneumomediastinum.  No airspace opacities are present.  There is stable appearance of minimal increased attenuation of the pulmonary interstitium.  There are degenerative changes in the osseous structures.  The subcutaneous tissues are unremarkable.  CT Head Without Contrast 9/21/18: FINDINGS:  There is generalized cerebral volume loss with compensatory sulcal widening and ventricular enlargement.  There is patchy and confluent periventricular and supratentorial  white matter hypoattenuation suggestive of sequelae of chronic microvascular ischemic change.  No evidence of acute intracranial hemorrhage or midline shift.  No extra-axial collections identified.  The basal cisterns are patent. The mastoid air cells and paranasal sinuses are clear of acute process. There are postoperative changes of the right globe.  The calvarium appears intact.  Impression: No CT evidence of acute intracranial abnormality.  If there is further concern for acute ischemia, further evaluation could be performed with MRI.  Generalized cerebral volume loss.  Patchy periventricular white matter hypoattenuation suggestive of sequelae of chronic microvascular ischemic change.  CT Abdomen Pelvis With Contrast 9/20/18: FINDINGS:  There is bibasilar atelectasis.  There are scattered coronary artery calcifications.  No significant pericardial effusion.  The liver is normal in size and attenuation with no focal hepatic abnormality.  There is a 2.1 cm calcified stone within the gallbladder lumen.  No significant gallbladder wall thickening is appreciated.  There is no intra-or extrahepatic biliary ductal dilatation.  The stomach, spleen, pancreas, and adrenal glands are unremarkable.  The right kidney demonstrates heterogeneous and diminished enhancement relative to the left kidney with reduced excretion of contrast material relative to the left kidney.  There is a moderate degree of perinephric inflammatory change with apparent urothelial enhancement.  Findings are concerning for right-sided pyelonephritis.  There is a punctate nonobstructing right lower pole calculus, however no definite obstructing ureteral calculus is identified.  There is a subcentimeter right renal hypodensity which is too small to accurately characterize.  The left kidney demonstrates appropriate enhancement and excretion of IV contrast material.  There is no left-sided hydronephrosis.  Urinary bladder appears within normal limits.   The uterus demonstrates a slightly lobular contour with dystrophic calcifications possibly relating to uterine fibroids.  No significant free fluid within the pelvis.  The abdominal aorta is normal in course and caliber with significant atherosclerotic calcification along its course.  The visualized loops of small and large bowel show no evidence of obstruction or inflammation.  The appendix appears normal in caliber.  There is scattered retained stool throughout the colon which may relate to mild constipation.  There is no free intraperitoneal air or portal venous gas.  There are mild anterior wedge compression deformities of the T8, T9, and L1 vertebral bodies of uncertain chronicity.  There are degenerative changes of the spine.  The extraperitoneal soft tissues are unremarkable.  Impression:  1.  Abnormal heterogeneous and diminished enhancement of the right kidney relative to the left kidney with associated urothelial enhancement and moderate degree of perinephric inflammatory change.  Findings are concerning for right-sided pyelonephritis.  2.  Punctate nonobstructing right lower pole calculus, however no definite obstructing ureteral calculus identified.  3.  Cholelithiasis without convincing CT evidence of acute cholecystitis.  4.  Mild anterior wedge compression deformities of the T8, T9, and L1 vertebral bodies of uncertain chronicity.  5.  Additional incidental findings as above.

## 2018-09-21 NOTE — PLAN OF CARE
Problem: Patient Care Overview  Goal: Plan of Care Review  Outcome: Ongoing (interventions implemented as appropriate)  Plan of care reviewed with patient. Patient voiced understanding. Sinus Tachy on monitor. No acute distress noted. P twas turned Q2. Side rails x 2, bed in lowest position, call bell within reach, pt advised to call for assistance. Maintain bed alarm for patient safety.

## 2018-09-21 NOTE — PLAN OF CARE
Problem: Physical Therapy Goal  Goal: Physical Therapy Goal  1.  Supine to sit with CG  2.  Sit to supine with CG  3.  Roll bilaterally with Min assist of 1  4.  Sit at EOB 12 minutes with supervision  Outcome: Ongoing (interventions implemented as appropriate)  Pt would benefit from PT to progress functional mobility.

## 2018-09-21 NOTE — HPI
Austyn Pedersen is a 78 y.o. white woman with hypertension, hyperlipidemia, glaucoma, bipolar disorder, dementia, chronic pain, overactive bladder.  She lives at [facility] in [place], [place].  She is wheelchair dependent.  She eats a no added salt regular diet.     She was taken to [facility] on 9/14/18 for syncope and urine incontinence.  She was hypotensive with blood pressure of 86/48.  Urinalysis was normal.  WBC count was 6,030.  She was given IV fluids.   She returned to [facility] on 9/20/18.  Blood pressure was normal to high this time.  Pulse was 113.  Temperature was 102.3° Fahrenheit.  Urinalysis showed 3+ protein, positive nitrite, >100 RBC/hpf, 50 WBC/hpf, few WBC clumps, and many bacteria.  WBC count was 15,530.  CT showed right nonobstructing kidney stone with pyelonephritis.  She was given ceftriaxone and 2,355 mL of normal saline.  She was admitted to [facility].

## 2018-09-21 NOTE — H&P
[facility] Medicine  History & Physical    Patient Name: Austyn Pedersen  MRN: [id number]  Admission Date: 9/20/2018  Attending Physician: Curtis Jansen MD   Primary Care Provider: Primary Doctor No         Patient information was obtained from patient, nursing home and ER records.     Subjective:     Principal Problem:Pyelonephritis    Chief Complaint:   Chief Complaint   Patient presents with    Fever     accompanied by generalized body aches, lethargy and AMS x2 hours        HPI: Austyn Pedersen is a 78 y.o. Female resident of [facility] with a PMHx of Hypertension, Chronic Back Pain, Bipolar disorder, Dementia, and Depression. She uses a wheelchair to get around. She eats a MYLES regular diet. She presented to [facility] ED via EMS complaining of generalized body aches, abdominal pain and dysuria since this morning and fever of 102. She denies any nausea, vomiting, cough, SOB, diarrhea, or any other concerning symptoms.  She was seen in the ED on 9/14/18 for Syncope, had a negative workup, was treated with IV fluids and sent back to the nursing home. In the ED she had a fever of 102.3 F, 's, RR 24,  SaO2 90% requiring oxygen. She has leukocytosis with a UTI.  Abominal Ct shows right-sided pyelonephritis with non-obstructing right lower pole calculus and cholelithiasis without evidence of cholecystitis. Her lactic acid is normal and she is not hypotensive. She was treated in the ED with 2.3 liters of Normal Saline and Ceftriaxone. She is admitted to [facility] for Sepsis due Pyelonephritis.          Past Medical History:   Diagnosis Date    Anemia     Bipolar disorder     Chronic pain     Dementia     Depression     Extrapyramidal and movement disorder     Glaucoma     Hyperlipidemia     Hypertension     Scabies 05/05/2017       History reviewed. No pertinent surgical history.    Review of patient's allergies indicates:  No Known Allergies    No current  facility-administered medications on file prior to encounter.      Current Outpatient Medications on File Prior to Encounter   Medication Sig    aspirin (ECOTRIN) 81 MG EC tablet Take 81 mg by mouth once daily.    calcium carbonate/vitamin D3 (CALCIUM 600 + D,3, ORAL) Take by mouth once daily.    carteolol (OCUPRESS) 1 % ophthalmic solution Place 1 drop into the right eye 2 (two) times daily.    cetirizine (ZYRTEC) 10 MG tablet Take 10 mg by mouth every evening.    cranberry fruit concentrate (AZO CRANBERRY ORAL) Take by mouth 3 (three) times daily.    cyanocobalamin (VITAMIN B-12) 100 MCG tablet Take 100 mcg by mouth once daily.    divalproex (DEPAKOTE) 250 MG EC tablet Take 250 mg by mouth once daily.    divalproex (DEPAKOTE) 500 MG TbEC Take 500 mg by mouth every evening.    docusate sodium (COLACE) 100 MG capsule Take 100 mg by mouth daily as needed for Constipation.    estradiol (ESTRACE) 0.01 % (0.1 mg/gram) vaginal cream Place 1 g vaginally twice a week. Monday and Thursday    fluticasone (FLONASE) 50 mcg/actuation nasal spray 1 spray by Each Nare route once daily.    HYDROcodone-acetaminophen (NORCO) 5-325 mg per tablet Take 1 tablet by mouth 3 (three) times daily as needed for Pain.    latanoprost 0.005 % ophthalmic solution Place 1 drop into the right eye every evening.    lisinopril (PRINIVIL,ZESTRIL) 20 MG tablet Take 20 mg by mouth once daily.    melatonin 3 mg Tab Take by mouth every evening.    memantine (NAMENDA) 5 MG Tab Take 5 mg by mouth once daily.    mirabegron (MYRBETRIQ) 50 mg Tb24 Take by mouth once daily.    multivitamin (ONE DAILY MULTIVITAMIN) per tablet Take 1 tablet by mouth once daily.    propranolol (INNOPRAN XL) 80 mg 24 hr capsule Take 80 mg by mouth once daily.    risperiDONE (RISPERDAL) 1 MG tablet Take 1 mg by mouth 2 (two) times daily.    tiZANidine (ZANAFLEX) 2 MG tablet Take 4 mg by mouth 3 (three) times daily.    traZODone (DESYREL) 50 MG tablet Take 25  mg by mouth every evening.    methylcellulose oral powder Take by mouth once daily. 30cc In orange juice daily    [DISCONTINUED] pravastatin (PRAVACHOL) 20 MG tablet Take 20 mg by mouth every evening.     Family History     None        Tobacco Use    Smoking status: Unknown If Ever Smoked   Substance and Sexual Activity    Alcohol use: Not on file    Drug use: Not on file    Sexual activity: Not on file     Review of Systems   Constitutional: Positive for chills and fever. Negative for diaphoresis.   HENT: Negative for sore throat and trouble swallowing.    Eyes: Negative for photophobia and visual disturbance.   Respiratory: Negative for cough, shortness of breath and wheezing.    Cardiovascular: Negative for chest pain and palpitations.   Gastrointestinal: Positive for abdominal pain. Negative for constipation, diarrhea, nausea and vomiting.   Endocrine: Negative for polydipsia and polyphagia.   Genitourinary: Positive for dysuria. Negative for decreased urine volume, hematuria and urgency.   Musculoskeletal: Positive for arthralgias and back pain. Negative for joint swelling, neck pain and neck stiffness.   Neurological: Negative for weakness, numbness and headaches.   Psychiatric/Behavioral: Negative for agitation, dysphoric mood and suicidal ideas.     Objective:     Vital Signs (Most Recent):  Temp: 99.8 °F (37.7 °C) (09/21/18 0054)  Pulse: (!) 118 (09/20/18 2315)  Resp: (!) 24 (09/20/18 2315)  BP: (!) 157/77 (09/20/18 2315)  SpO2: 97 % (09/20/18 2315) Vital Signs (24h Range):  Temp:  [99.8 °F (37.7 °C)-102.3 °F (39.1 °C)] 99.8 °F (37.7 °C)  Pulse:  [113-118] 118  Resp:  [20-24] 24  SpO2:  [90 %-97 %] 97 %  BP: (126-157)/(58-77) 157/77     Weight: 78.5 kg (173 lb)  Body mass index is 24.82 kg/m².    Physical Exam   Constitutional: She appears well-developed and well-nourished. She appears distressed. Nasal cannula in place.   HENT:   Head: Normocephalic and atraumatic.   Mouth/Throat: Oropharynx is clear  and moist. No oropharyngeal exudate.   Eyes: Conjunctivae are normal. Pupils are equal, round, and reactive to light. No scleral icterus.   Neck: Neck supple.   Cardiovascular: Regular rhythm, normal heart sounds and intact distal pulses. Tachycardia present. Exam reveals no gallop and no friction rub.   No murmur heard.  Pulmonary/Chest: Effort normal and breath sounds normal. No respiratory distress. She has no wheezes. She has no rales.   Abdominal: Soft. She exhibits distension. Bowel sounds are decreased. There is no tenderness. There is no rebound and no guarding.   Musculoskeletal: She exhibits no edema, tenderness or deformity.   Neurological: She is alert. She exhibits normal muscle tone.   Oriented to Person, not place and time.   Skin: Skin is warm and dry. Capillary refill takes less than 2 seconds. No rash noted. She is not diaphoretic.   Psychiatric: She has a normal mood and affect. Her behavior is normal.   Nursing note and vitals reviewed.        CRANIAL NERVES     CN III, IV, VI   Pupils are equal, round, and reactive to light.       Significant Labs:   CBC:   Recent Labs   Lab  09/20/18 2102   WBC  15.53*   HGB  12.6   HCT  38.9   PLT  215     CMP:   Recent Labs   Lab  09/20/18 2102   NA  137   K  4.2   CL  97   CO2  29   GLU  129*   BUN  26*   CREATININE  1.2   CALCIUM  10.1   PROT  7.3   ALBUMIN  3.1*   BILITOT  1.0   ALKPHOS  88   AST  23   ALT  18   ANIONGAP  11   EGFRNONAA  44*     Lactic Acid:   Recent Labs   Lab  09/20/18   2102   LACTATE  1.1     Urine Studies:   Recent Labs   Lab  09/20/18 2114   COLORU  Yellow   APPEARANCEUA  Hazy*   PHUR  6.0   SPECGRAV  1.025   PROTEINUA  3+*   GLUCUA  Negative   KETONESU  Trace*   BILIRUBINUA  Negative   OCCULTUA  3+*   NITRITE  Positive*   UROBILINOGEN  Negative   LEUKOCYTESUR  1+*   RBCUA  >100*   WBCUA  50*   BACTERIA  Many*   SQUAMEPITHEL  2   HYALINECASTS  0     All pertinent labs within the past 24 hours have been reviewed.    Significant  Imaging: I have reviewed all pertinent imaging results/findings within the past 24 hours.   Imaging Results          CT Abdomen Pelvis With Contrast (Final result)  Result time 09/20/18 23:04:03    Final result by Barrera Pierce MD (09/20/18 23:04:03)                 Impression:      1.  Abnormal heterogeneous and diminished enhancement of the right kidney relative to the left kidney with associated urothelial enhancement and moderate degree of perinephric inflammatory change.  Findings are concerning for right-sided pyelonephritis.    2.  Punctate nonobstructing right lower pole calculus, however no definite obstructing ureteral calculus identified.    3.  Cholelithiasis without convincing CT evidence of acute cholecystitis.    4.  Mild anterior wedge compression deformities of the T8, T9, and L1 vertebral bodies of uncertain chronicity.    5.  Additional incidental findings as above.      Electronically signed by: Barrera Pierce MD  Date:    09/20/2018  Time:    23:04             Narrative:    EXAMINATION:  CT ABDOMEN PELVIS WITH CONTRAST    CLINICAL HISTORY:  Abd pain;    TECHNIQUE:  Low dose axial images, sagittal and coronal reformations were obtained from the lung bases to the pubic symphysis following the IV administration of 75 mL of Omnipaque 350 and the oral administration of 30 mL of Omnipaque 350.    COMPARISON:  None.    FINDINGS:  There is bibasilar atelectasis.  There are scattered coronary artery calcifications.  No significant pericardial effusion.    The liver is normal in size and attenuation with no focal hepatic abnormality.  There is a 2.1 cm calcified stone within the gallbladder lumen.  No significant gallbladder wall thickening is appreciated.  There is no intra-or extrahepatic biliary ductal dilatation.    The stomach, spleen, pancreas, and adrenal glands are unremarkable.    The right kidney demonstrates heterogeneous and diminished enhancement relative to the left kidney with reduced  excretion of contrast material relative to the left kidney.  There is a moderate degree of perinephric inflammatory change with apparent urothelial enhancement.  Findings are concerning for right-sided pyelonephritis.  There is a punctate nonobstructing right lower pole calculus, however no definite obstructing ureteral calculus is identified.  There is a subcentimeter right renal hypodensity which is too small to accurately characterize.  The left kidney demonstrates appropriate enhancement and excretion of IV contrast material.  There is no left-sided hydronephrosis.  Urinary bladder appears within normal limits.  The uterus demonstrates a slightly lobular contour with dystrophic calcifications possibly relating to uterine fibroids.  No significant free fluid within the pelvis.    The abdominal aorta is normal in course and caliber with significant atherosclerotic calcification along its course.    The visualized loops of small and large bowel show no evidence of obstruction or inflammation.  The appendix appears normal in caliber.  There is scattered retained stool throughout the colon which may relate to mild constipation.  There is no free intraperitoneal air or portal venous gas.    There are mild anterior wedge compression deformities of the T8, T9, and L1 vertebral bodies of uncertain chronicity.  There are degenerative changes of the spine.  The extraperitoneal soft tissues are unremarkable.                               X-Ray Chest AP Portable (Final result)  Result time 09/20/18 21:06:51    Final result by Evin Domingo MD (09/20/18 21:06:51)                 Impression:      No airspace opacity.    Stable appearance of borderline cardiomegaly with mild pulmonary vascular congestion.      Electronically signed by: Evin Domigno MD  Date:    09/20/2018  Time:    21:06             Narrative:    EXAMINATION:  XR CHEST AP PORTABLE    CLINICAL HISTORY:  Sepsis.    TECHNIQUE:  Single frontal view of the chest was  performed.    COMPARISON:  09/14/2018.    FINDINGS:  The trachea is unremarkable.  The cardiomediastinal silhouette is prominent.  The hemidiaphragms are unremarkable.  There is no evidence of free air beneath the hemidiaphragms.  There are no pleural effusions.  There is no evidence of a pneumothorax.  There is no evidence of pneumomediastinum.  No airspace opacities are present.  There is stable appearance of minimal increased attenuation of the pulmonary interstitium.  There are degenerative changes in the osseous structures.  The subcutaneous tissues are unremarkable.                                  Assessment/Plan:     * Pyelonephritis    Severe sepsis  Nursing home resident with Fever, tachycardia, tachypnea, leukocytosis, Urine with many bacteria/leukocytes and right pyelonephritis with non-obstructing right lower pole calculus .   --Given 2.3 liters of IV fluids and ceftriaxone in the ED  --Change to Meropenum to cover for pseudomonas  --Follow Cultures and fever curve  --Strict Intake and Output          Chronic pain    Continue home tizanidine and Norco 5 PRN          Debility    From [facility]. Wheelchair Bound.  --PT/OT          Essential hypertension    -118  --Holding Home lisinopril and propranolol  --Continue home aspirin 81 mg daily          Dementia without behavioral disturbance              Bipolar affective disorder    Dementia without behavioral disturbance  Major depression  Mildly disoriented. Mood Stable  --Continue home Memantine and riserodone,               VTE Risk Mitigation (From admission, onward)    None             Radha Olivera NP  Department of Hospital Medicine   [facility]

## 2018-09-21 NOTE — ED NOTES
SIMONA Olivera, NP notified of patient tacypneic, tachycardic, and hypertensive. Patient shivering/trembling. Tylenol given for low-grade temp. NP states she will address. Awaiting further orders.

## 2018-09-21 NOTE — ASSESSMENT & PLAN NOTE
Severe sepsis  Nursing home resident with Fever, tachycardia, tachypnea, leukocytosis, Urine with many bacteria/leukocytes and right pyelonephritis with non-obstructing right lower pole calculus .   --Given 2.3 liters of IV fluids and ceftriaxone in the ED  --Change to Meropenum to cover for pseudomonas  --Follow Cultures and fever curve  --Strict Intake and Output

## 2018-09-21 NOTE — PLAN OF CARE
Problem: Patient Care Overview  Goal: Plan of Care Review  Outcome: Ongoing (interventions implemented as appropriate)  Patient found on 2LPM NC. Will continue to monitor.

## 2018-09-21 NOTE — NURSING
Notified charge nurse Rhea that patient has been very lethargic since patient came up to the floor (since 0240), only responsive to vigorous stimulation, and not follow commands, and patient drool on right side, uncertain for po medication at this moment.  VS and glucose rechecked before calling BETTY Olivera.

## 2018-09-21 NOTE — SIGNIFICANT EVENT
Called to the bedside to evaluate patient's altered mental status.  Patient was minimally responsive to sternal rub. I was able to get her to open eyes for a second and say her name.  She was drifting in the bed to the left and could not hold her up.  Both arms drifting when I lifted them, but the Left swifter then the right. STAT ABG and Head CT were normal.  She did not receive any sedating medications. She was protecting her airway. Code stroke was called. However, by the time Tele Stroke MD arrived on monitor, patient was able to arouse, speak coherently, and follow all commands.  Vascular found no reason to continue to follow patient. There are no neurological derangements to explain her altered mental status.    Assessment:  Encephalopathy due to her Sepsis.     Critical Care Time: 1 Hour of critical care time was spent in the care of the patient. This included management of organ failures related to critical illness, titration of continuous infusions, review of pertinent labs and imaging studies, discussion of the patient with consulting services, and discussion of the patients condition with the patient and family.

## 2018-09-21 NOTE — ED NOTES
First Liter of NS is complete, started second liter bag. Pt in bed, eyes closed. Pt wakes with stimulation.

## 2018-09-21 NOTE — PLAN OF CARE
Problem: Occupational Therapy Goal  Goal: Occupational Therapy Goal  Goals to be met by: 10/20/2018    Patient will increase functional independence with ADLs by performing:    Feeding with Supervision.  Grooming while seated with Minimal Assistance.  Supine to sit with Maximum Assistance.  Stand pivot transfers with Maximum Assistance.  Increased functional strength to WFL for BUE.  Upper extremity exercise program 10 eps per handout, with supervision.    Outcome: Ongoing (interventions implemented as appropriate)  OT initial eval completed and treatment initiated with PROM/stretching to UE. Pt. Lethargic, tremulous, rigid and stiff in arms, inconsistent following commands and limited verbalizations. Discharge recommendations back to NH.  Continue with OT POC.

## 2018-09-21 NOTE — ASSESSMENT & PLAN NOTE
Dementia without behavioral disturbance  Takes memantine, risperidone, divalproex, trazodone at home.

## 2018-09-21 NOTE — PROGRESS NOTES
[facility]  Progress Note    Patient Name: Austyn Pedersen  MRN: [id number]  Patient Class: IP- Inpatient   Admission Date: 9/20/2018  Length of Stay: 0 days  Attending Physician: Curtis Jansen MD  Primary Care Provider: Primary Doctor No        Subjective:     Principal Problem:Pyelonephritis    HPI:  Austyn Pedersen is a 78 y.o. white woman with hypertension, hyperlipidemia, glaucoma, bipolar disorder, dementia, chronic pain, overactive bladder.  She lives at [facility] in [place], [place].  She is wheelchair dependent.  She eats a no added salt regular diet.     She was taken to [facility] on 9/14/18 for syncope and urine incontinence.  She was hypotensive with blood pressure of 86/48.  Urinalysis was normal.  WBC count was 6,030.  She was given IV fluids.   She returned to [facility] on 9/20/18.  Blood pressure was normal to high this time.  Pulse was 113.  Temperature was 102.3° Fahrenheit.  Urinalysis showed 3+ protein, positive nitrite, >100 RBC/hpf, 50 WBC/hpf, few WBC clumps, and many bacteria.  WBC count was 15,530.  CT showed right nonobstructing kidney stone with pyelonephritis.  She was given ceftriaxone and 2,355 mL of normal saline.  She was admitted to [facility].    Hospital Course:  She was put on meropenem.  Urine culture was checked to help adjust antibiotic.  Temperatures and WBC count decreased.  She had encephalopathy with difficulty swallowing.    Interval History: Reports pain all over.    Review of Systems   Unable to perform ROS: Mental status change     Objective:     Vital Signs (Most Recent):  Temp: 99.1 °F (37.3 °C) (09/21/18 1148)  Pulse: 102 (09/21/18 1148)  Resp: 14 (09/21/18 1148)  BP: (!) 180/73 (09/21/18 1148)  SpO2: 99 % (09/21/18 0737) Vital Signs (24h Range):  Temp:  [99.1 °F (37.3 °C)-102.3 °F (39.1 °C)] 99.1 °F (37.3 °C)  Pulse:   [] 102  Resp:  [14-31] 14  SpO2:  [90 %-99 %] 99 %  BP: (124-211)/(58-81) 180/73     Weight: 82.8 kg (182 lb 8.7 oz)  Body mass index is 26.19 kg/m².    Intake/Output Summary (Last 24 hours) at 9/21/2018 1203  Last data filed at 9/21/2018 0059  Gross per 24 hour   Intake 2455 ml   Output --   Net 2455 ml      Physical Exam   Constitutional: She appears well-developed. No distress.   shivering   Pulmonary/Chest: Effort normal. No respiratory distress.   Neurological: She is alert. She displays no seizure activity.   Nursing note and vitals reviewed.      Significant Labs: All pertinent labs within the past 24 hours have been reviewed.    Significant Imaging: I have reviewed all pertinent imaging results/findings within the past 24 hours.   X-Ray Chest AP Portable 9/20/18: FINDINGS:  The trachea is unremarkable.  The cardiomediastinal silhouette is prominent.  The hemidiaphragms are unremarkable.  There is no evidence of free air beneath the hemidiaphragms.  There are no pleural effusions.  There is no evidence of a pneumothorax.  There is no evidence of pneumomediastinum.  No airspace opacities are present.  There is stable appearance of minimal increased attenuation of the pulmonary interstitium.  There are degenerative changes in the osseous structures.  The subcutaneous tissues are unremarkable.  CT Head Without Contrast 9/21/18: FINDINGS:  There is generalized cerebral volume loss with compensatory sulcal widening and ventricular enlargement.  There is patchy and confluent periventricular and supratentorial white matter hypoattenuation suggestive of sequelae of chronic microvascular ischemic change.  No evidence of acute intracranial hemorrhage or midline shift.  No extra-axial collections identified.  The basal cisterns are patent. The mastoid air cells and paranasal sinuses are clear of acute process. There are postoperative changes of the right globe.  The calvarium appears intact.  Impression: No CT  evidence of acute intracranial abnormality.  If there is further concern for acute ischemia, further evaluation could be performed with MRI.  Generalized cerebral volume loss.  Patchy periventricular white matter hypoattenuation suggestive of sequelae of chronic microvascular ischemic change.  CT Abdomen Pelvis With Contrast 9/20/18: FINDINGS:  There is bibasilar atelectasis.  There are scattered coronary artery calcifications.  No significant pericardial effusion.  The liver is normal in size and attenuation with no focal hepatic abnormality.  There is a 2.1 cm calcified stone within the gallbladder lumen.  No significant gallbladder wall thickening is appreciated.  There is no intra-or extrahepatic biliary ductal dilatation.  The stomach, spleen, pancreas, and adrenal glands are unremarkable.  The right kidney demonstrates heterogeneous and diminished enhancement relative to the left kidney with reduced excretion of contrast material relative to the left kidney.  There is a moderate degree of perinephric inflammatory change with apparent urothelial enhancement.  Findings are concerning for right-sided pyelonephritis.  There is a punctate nonobstructing right lower pole calculus, however no definite obstructing ureteral calculus is identified.  There is a subcentimeter right renal hypodensity which is too small to accurately characterize.  The left kidney demonstrates appropriate enhancement and excretion of IV contrast material.  There is no left-sided hydronephrosis.  Urinary bladder appears within normal limits.  The uterus demonstrates a slightly lobular contour with dystrophic calcifications possibly relating to uterine fibroids.  No significant free fluid within the pelvis.  The abdominal aorta is normal in course and caliber with significant atherosclerotic calcification along its course.  The visualized loops of small and large bowel show no evidence of obstruction or inflammation.  The appendix appears  normal in caliber.  There is scattered retained stool throughout the colon which may relate to mild constipation.  There is no free intraperitoneal air or portal venous gas.  There are mild anterior wedge compression deformities of the T8, T9, and L1 vertebral bodies of uncertain chronicity.  There are degenerative changes of the spine.  The extraperitoneal soft tissues are unremarkable.  Impression:  1.  Abnormal heterogeneous and diminished enhancement of the right kidney relative to the left kidney with associated urothelial enhancement and moderate degree of perinephric inflammatory change.  Findings are concerning for right-sided pyelonephritis.  2.  Punctate nonobstructing right lower pole calculus, however no definite obstructing ureteral calculus identified.  3.  Cholelithiasis without convincing CT evidence of acute cholecystitis.  4.  Mild anterior wedge compression deformities of the T8, T9, and L1 vertebral bodies of uncertain chronicity.  5.  Additional incidental findings as above.    Assessment/Plan:      * Pyelonephritis    Right nephrolithiasis  Severe sepsis  Septic encephalopathy  Give 2 more liters of IV saline to help flush kidney stone.  Continue meropenem.  Follow up culture results.  Speech Therapy swallow evaluation.          Nursing home resident    Return to [facility] on discharge.          Chronic pain    Takes tizanidine and hydrocodone-acetaminophen at home.          Debility    Wheelchair dependent  PT/OT.          Essential hypertension    Takes lisinopril and propranolol at home.        Dementia without behavioral disturbance              Bipolar affective disorder    Dementia without behavioral disturbance  Takes memantine, risperidone, divalproex, trazodone at home.              VTE Risk Mitigation (From admission, onward)        Ordered     enoxaparin injection 40 mg  Daily      09/21/18 0236     IP VTE HIGH RISK PATIENT  Once      09/21/18 0236              Curtis Jansen,  MD  Department of [facility]   [facility]

## 2018-09-21 NOTE — PROGRESS NOTES
Pharmacist Renal Dose Adjustment Note    Austyn Pedersen is a 78 y.o. female being treated with the medication Meropenem.    Patient Data:    Vital Signs (Most Recent):  Temp: (!) 100.7 °F (38.2 °C) (09/21/18 0233)  Pulse: 105 (09/21/18 0233)  Resp: (!) 22 (09/21/18 0233)  BP: (!) 124/59 (09/21/18 0233)  SpO2: (!) 93 % (09/21/18 0233)   Vital Signs (72h Range):  Temp:  [99.8 °F (37.7 °C)-102.3 °F (39.1 °C)]   Pulse:  [105-130]   Resp:  [20-31]   BP: (124-211)/(58-81)   SpO2:  [90 %-97 %]      Recent Labs   Lab  09/14/18   1011  09/20/18 2102   CREATININE  1.0  1.2     Serum creatinine: 1.2 mg/dL 09/20/18 2102  Estimated creatinine clearance: 46.7 mL/min    Medication: Meropenem 1000 mg IV q8h will be changed to Meropenem 1000 mg IV q12h.    Pharmacist's Name: Tim Lainez  Pharmacist's Extension: [phone]

## 2018-09-21 NOTE — PLAN OF CARE
Principal Problem:Pyelonephritis     Chief Complaint:        Chief Complaint   Patient presents with    Fever       accompanied by generalized body aches, lethargy and AMS x2 hours       Pt has been a resident at [facility], for past 3 years,  TN spoke wit pt bedside, pt not oriented to place/time, stated that TN should contact friend/caregiver Liya Francisco [phone] for discharge assessment information. At [facility], pt is WC bound.       09/21/18 4945   Discharge Assessment   Assessment Type Discharge Planning Assessment   Confirmed/corrected address and phone number on facesheet? Yes   Assessment information obtained from? Caregiver  (Caregiver: Liya Francisco [phone])   Expected Length of Stay (days) 3   Communicated expected length of stay with patient/caregiver yes   Prior to hospitilization cognitive status: Not Oriented to Time;Not Oriented to Place   Prior to hospitalization functional status: Wheelchair Bound;Partially Dependent   Current cognitive status: Not Oriented to Time;Not Oriented to Place   Current Functional Status: Wheelchair Bound;Partially Dependent   Facility Arrived From: [facility]   Lives With facility resident  ([facility])   Able to Return to Prior Arrangements yes   Is patient able to care for self after discharge? No   Who are your caregiver(s) and their phone number(s)? Caregiver: Liya Francisco [phone]   Patient's perception of discharge disposition nursing home   Readmission Within The Last 30 Days no previous admission in last 30 days   Patient currently being followed by outpatient case management? Unable to determine (comments)   Patient currently receives any other outside agency services? No   Equipment Currently Used at Home wheelchair;hospital bed  (DME per NH)   Do you have any problems affording any of your prescribed medications? No   Is the patient taking medications as prescribed? yes   Does the patient have transportation home? Yes   Transportation Available  agency transportation   Dialysis Name and Scheduled days N/A   Does the patient receive services at the [facility]? No   Discharge Plan A Return to nursing home   Patient/Family In Agreement With Plan yes     Dejah Murillo RN Transitional Navigator  [phone]

## 2018-09-21 NOTE — ASSESSMENT & PLAN NOTE
Dementia without behavioral disturbance  Major depression  Mildly disoriented. Mood Stable  --Continue home Memantine and riserodone,

## 2018-09-22 PROBLEM — K59.03 CONSTIPATION DUE TO OPIOID THERAPY: Status: ACTIVE | Noted: 2018-09-22

## 2018-09-22 PROBLEM — B96.20 E. COLI PYELONEPHRITIS: Status: ACTIVE | Noted: 2018-09-21

## 2018-09-22 PROBLEM — T40.2X5A CONSTIPATION DUE TO OPIOID THERAPY: Status: ACTIVE | Noted: 2018-09-22

## 2018-09-22 PROBLEM — B96.20 E COLI BACTEREMIA: Status: ACTIVE | Noted: 2018-09-21

## 2018-09-22 LAB
ANION GAP SERPL CALC-SCNC: 10 MMOL/L
BASOPHILS # BLD AUTO: 0.02 K/UL
BASOPHILS NFR BLD: 0.2 %
BUN SERPL-MCNC: 19 MG/DL
CALCIUM SERPL-MCNC: 9.3 MG/DL
CHLORIDE SERPL-SCNC: 103 MMOL/L
CO2 SERPL-SCNC: 24 MMOL/L
CREAT SERPL-MCNC: 0.8 MG/DL
DIFFERENTIAL METHOD: ABNORMAL
EOSINOPHIL # BLD AUTO: 0 K/UL
EOSINOPHIL NFR BLD: 0.1 %
ERYTHROCYTE [DISTWIDTH] IN BLOOD BY AUTOMATED COUNT: 14.1 %
EST. GFR  (AFRICAN AMERICAN): >60 ML/MIN/1.73 M^2
EST. GFR  (NON AFRICAN AMERICAN): >60 ML/MIN/1.73 M^2
GLUCOSE SERPL-MCNC: 108 MG/DL
HCT VFR BLD AUTO: 33.2 %
HGB BLD-MCNC: 10.8 G/DL
LYMPHOCYTES # BLD AUTO: 0.5 K/UL
LYMPHOCYTES NFR BLD: 4.4 %
MCH RBC QN AUTO: 30.3 PG
MCHC RBC AUTO-ENTMCNC: 32.5 G/DL
MCV RBC AUTO: 93 FL
MONOCYTES # BLD AUTO: 1.9 K/UL
MONOCYTES NFR BLD: 16.1 %
NEUTROPHILS # BLD AUTO: 9.5 K/UL
NEUTROPHILS NFR BLD: 78.9 %
PLATELET # BLD AUTO: 176 K/UL
PMV BLD AUTO: 10.7 FL
POTASSIUM SERPL-SCNC: 3.9 MMOL/L
RBC # BLD AUTO: 3.56 M/UL
SODIUM SERPL-SCNC: 137 MMOL/L
WBC # BLD AUTO: 12.08 K/UL

## 2018-09-22 PROCEDURE — 63600175 PHARM REV CODE 636 W HCPCS: Performed by: NURSE PRACTITIONER

## 2018-09-22 PROCEDURE — 97535 SELF CARE MNGMENT TRAINING: CPT

## 2018-09-22 PROCEDURE — 87040 BLOOD CULTURE FOR BACTERIA: CPT

## 2018-09-22 PROCEDURE — 36415 COLL VENOUS BLD VENIPUNCTURE: CPT

## 2018-09-22 PROCEDURE — 25000003 PHARM REV CODE 250: Performed by: NURSE PRACTITIONER

## 2018-09-22 PROCEDURE — 27000221 HC OXYGEN, UP TO 24 HOURS

## 2018-09-22 PROCEDURE — 85025 COMPLETE CBC W/AUTO DIFF WBC: CPT

## 2018-09-22 PROCEDURE — G8996 SWALLOW CURRENT STATUS: HCPCS | Mod: CL

## 2018-09-22 PROCEDURE — 25000003 PHARM REV CODE 250: Performed by: HOSPITALIST

## 2018-09-22 PROCEDURE — 80048 BASIC METABOLIC PNL TOTAL CA: CPT

## 2018-09-22 PROCEDURE — 94761 N-INVAS EAR/PLS OXIMETRY MLT: CPT

## 2018-09-22 PROCEDURE — 11000001 HC ACUTE MED/SURG PRIVATE ROOM

## 2018-09-22 PROCEDURE — 92610 EVALUATE SWALLOWING FUNCTION: CPT

## 2018-09-22 PROCEDURE — G8997 SWALLOW GOAL STATUS: HCPCS | Mod: CK

## 2018-09-22 RX ORDER — MEMANTINE HYDROCHLORIDE 5 MG/1
5 TABLET ORAL DAILY
Status: DISCONTINUED | OUTPATIENT
Start: 2018-09-22 | End: 2018-09-24 | Stop reason: HOSPADM

## 2018-09-22 RX ORDER — LACTULOSE 10 G/15ML
15 SOLUTION ORAL EVERY 6 HOURS PRN
Status: DISCONTINUED | OUTPATIENT
Start: 2018-09-22 | End: 2018-09-24 | Stop reason: HOSPADM

## 2018-09-22 RX ORDER — BISACODYL 10 MG
10 SUPPOSITORY, RECTAL RECTAL DAILY PRN
Status: DISCONTINUED | OUTPATIENT
Start: 2018-09-22 | End: 2018-09-24 | Stop reason: HOSPADM

## 2018-09-22 RX ORDER — AMOXICILLIN 250 MG
1 CAPSULE ORAL 2 TIMES DAILY
Status: DISCONTINUED | OUTPATIENT
Start: 2018-09-22 | End: 2018-09-24 | Stop reason: HOSPADM

## 2018-09-22 RX ORDER — PROPRANOLOL HYDROCHLORIDE 80 MG/1
80 CAPSULE, EXTENDED RELEASE ORAL DAILY
Status: DISCONTINUED | OUTPATIENT
Start: 2018-09-22 | End: 2018-09-24 | Stop reason: HOSPADM

## 2018-09-22 RX ORDER — DIVALPROEX SODIUM 125 MG/1
250 TABLET, DELAYED RELEASE ORAL DAILY
Status: DISCONTINUED | OUTPATIENT
Start: 2018-09-22 | End: 2018-09-24 | Stop reason: HOSPADM

## 2018-09-22 RX ORDER — RISPERIDONE 0.25 MG/1
1 TABLET ORAL 2 TIMES DAILY
Status: DISCONTINUED | OUTPATIENT
Start: 2018-09-22 | End: 2018-09-24 | Stop reason: HOSPADM

## 2018-09-22 RX ORDER — DIVALPROEX SODIUM 125 MG/1
500 TABLET, DELAYED RELEASE ORAL NIGHTLY
Status: DISCONTINUED | OUTPATIENT
Start: 2018-09-22 | End: 2018-09-24 | Stop reason: HOSPADM

## 2018-09-22 RX ORDER — LISINOPRIL 20 MG/1
20 TABLET ORAL DAILY
Status: DISCONTINUED | OUTPATIENT
Start: 2018-09-22 | End: 2018-09-24 | Stop reason: HOSPADM

## 2018-09-22 RX ORDER — TIZANIDINE 4 MG/1
4 TABLET ORAL 3 TIMES DAILY
Status: DISCONTINUED | OUTPATIENT
Start: 2018-09-22 | End: 2018-09-24 | Stop reason: HOSPADM

## 2018-09-22 RX ADMIN — TIMOLOL MALEATE 1 DROP: 5 SOLUTION OPHTHALMIC at 08:09

## 2018-09-22 RX ADMIN — DIVALPROEX SODIUM 500 MG: 125 TABLET, DELAYED RELEASE ORAL at 08:09

## 2018-09-22 RX ADMIN — PROPRANOLOL HYDROCHLORIDE 80 MG: 80 CAPSULE, EXTENDED RELEASE ORAL at 12:09

## 2018-09-22 RX ADMIN — RISPERIDONE 1 MG: 0.25 TABLET ORAL at 08:09

## 2018-09-22 RX ADMIN — SENNOSIDES AND DOCUSATE SODIUM 1 TABLET: 8.6; 5 TABLET ORAL at 08:09

## 2018-09-22 RX ADMIN — MEROPENEM 1 G: 1 INJECTION, POWDER, FOR SOLUTION INTRAVENOUS at 02:09

## 2018-09-22 RX ADMIN — SODIUM CHLORIDE: 0.9 INJECTION, SOLUTION INTRAVENOUS at 02:09

## 2018-09-22 RX ADMIN — SENNOSIDES AND DOCUSATE SODIUM 1 TABLET: 8.6; 5 TABLET ORAL at 12:09

## 2018-09-22 RX ADMIN — TIZANIDINE 4 MG: 4 TABLET ORAL at 08:09

## 2018-09-22 RX ADMIN — LISINOPRIL 20 MG: 20 TABLET ORAL at 12:09

## 2018-09-22 RX ADMIN — ENOXAPARIN SODIUM 40 MG: 100 INJECTION SUBCUTANEOUS at 04:09

## 2018-09-22 RX ADMIN — FLUTICASONE PROPIONATE 50 MCG: 50 SPRAY, METERED NASAL at 09:09

## 2018-09-22 RX ADMIN — MEMANTINE HYDROCHLORIDE 5 MG: 5 TABLET ORAL at 12:09

## 2018-09-22 RX ADMIN — LABETALOL HYDROCHLORIDE 10 MG: 5 INJECTION, SOLUTION INTRAVENOUS at 09:09

## 2018-09-22 RX ADMIN — DOCUSATE SODIUM 100 MG: 100 CAPSULE, LIQUID FILLED ORAL at 12:09

## 2018-09-22 RX ADMIN — MEROPENEM 1 G: 1 INJECTION, POWDER, FOR SOLUTION INTRAVENOUS at 04:09

## 2018-09-22 RX ADMIN — LATANOPROST 1 DROP: 50 SOLUTION OPHTHALMIC at 08:09

## 2018-09-22 RX ADMIN — RISPERIDONE 1 MG: 0.25 TABLET ORAL at 12:09

## 2018-09-22 RX ADMIN — TIMOLOL MALEATE 1 DROP: 5 SOLUTION OPHTHALMIC at 09:09

## 2018-09-22 RX ADMIN — DIVALPROEX SODIUM 250 MG: 125 TABLET, DELAYED RELEASE ORAL at 12:09

## 2018-09-22 RX ADMIN — TIZANIDINE 4 MG: 4 TABLET ORAL at 04:09

## 2018-09-22 RX ADMIN — LABETALOL HYDROCHLORIDE 10 MG: 5 INJECTION, SOLUTION INTRAVENOUS at 04:09

## 2018-09-22 NOTE — PLAN OF CARE
Problem: Patient Care Overview  Goal: Plan of Care Review  Outcome: Ongoing (interventions implemented as appropriate)  Plan of care reviewed with patient.  Disoriented to place, time, situation.  Patient had a fever, resolved with tylenol.  No complaint of pain or distress noted throughout shift.  On continuous telemetry monitor, ST to NSR with HR in 80s-110s.  No ectopy noted.  On 2L NC.  NS infusing @ 100 mL/hr.  On NPO.  Fall precaution measures maintained.  Bed locked, alarm on, in the lowest position, side rails up x2, non-skid socks applied, and call light within reach.  Will continue to monitor.

## 2018-09-22 NOTE — PLAN OF CARE
Problem: Patient Care Overview  Goal: Plan of Care Review  Outcome: Ongoing (interventions implemented as appropriate)  Plan of care reviewed with patient. Voices understanding. NSR on monitor with no red alarms noted. Stool softeners administered per orders. Elevated BP today. IV bp medications administered per orders. Advanced to full liquid diet. Side rails x3, bed low, call bell within reach. Maintain bed alarm for patient safety. Patient will be monitored overnight.

## 2018-09-22 NOTE — PROGRESS NOTES
[facility]  Progress Note    Patient Name: Austyn Pedersen  MRN: [id number]  Patient Class: IP- Inpatient   Admission Date: 9/20/2018  Length of Stay: 1 days  Attending Physician: Curtis Jansen MD  Primary Care Provider: Primary Doctor No        Subjective:     Principal Problem:Pyelonephritis    HPI:  Austyn Pedersen is a 78 y.o. white woman with hypertension, hyperlipidemia, glaucoma, bipolar disorder, dementia, chronic pain, overactive bladder.  She lives at [facility] in [place], [place].  She is wheelchair dependent.  She eats a no added salt regular diet.     She was taken to [facility] on 9/14/18 for syncope and urine incontinence.  She was hypotensive with blood pressure of 86/48.  Urinalysis was normal.  WBC count was 6,030.  She was given IV fluids.   She returned to [facility] on 9/20/18.  Blood pressure was normal to high this time.  Pulse was 113.  Temperature was 102.3° Fahrenheit.  Urinalysis showed 3+ protein, positive nitrite, >100 RBC/hpf, 50 WBC/hpf, few WBC clumps, and many bacteria.  WBC count was 15,530.  CT showed right nonobstructing kidney stone with pyelonephritis.  She was given ceftriaxone and 2,355 mL of normal saline.  She was admitted to [facility].    Hospital Course:  She was put on meropenem.  Urine culture was checked to help adjust antibiotic.  Temperatures and WBC count decreased.  She had encephalopathy with difficulty swallowing.  Her blood cultures grew the bacteria causing her pyelonephritis.      Interval History: Reports pain all over, mostly abdomen.  Cannot remember having a recent bowel movement.    Review of Systems   Respiratory: Negative for cough and shortness of breath.    Gastrointestinal: Positive for abdominal pain and constipation.     Objective:     Vital Signs (Most Recent):  Temp: 99 °F (37.2 °C) (09/22/18 0740)  Pulse:  106 (09/22/18 0805)  Resp: 18 (09/22/18 0740)  BP: (!) 192/86 (09/22/18 0740)  SpO2: 95 % (09/22/18 0805) Vital Signs (24h Range):  Temp:  [99 °F (37.2 °C)-102.2 °F (39 °C)] 99 °F (37.2 °C)  Pulse:  [] 106  Resp:  [14-22] 18  SpO2:  [94 %-95 %] 95 %  BP: (142-192)/(73-94) 192/86     Weight: 82.8 kg (182 lb 8.7 oz)  Body mass index is 26.19 kg/m².    Intake/Output Summary (Last 24 hours) at 9/22/2018 1033  Last data filed at 9/22/2018 1028  Gross per 24 hour   Intake --   Output 1101 ml   Net -1101 ml      Physical Exam   Constitutional: She appears well-developed. No distress.   shivering   Pulmonary/Chest: Effort normal. No respiratory distress.   Abdominal: Soft. There is no tenderness.   Neurological: She is alert. She displays no seizure activity.   Psychiatric: Cognition and memory are impaired.   Nursing note and vitals reviewed.      Significant Labs: All pertinent labs within the past 24 hours have been reviewed.    Significant Imaging: I have reviewed all pertinent imaging results/findings within the past 24 hours.     Assessment/Plan:      * Pyelonephritis    Right nephrolithiasis  Severe sepsis  Septic encephalopathy  Gram-negative bacteremia  Continue meropenem.  Follow up culture results.  Speech Therapy swallow evaluation.          Constipation due to opioid therapy    Senna-docusate BID, lactulose and bisacodyl suppository PRN.          Nursing home resident    Return to [facility] on discharge.          Chronic pain    Takes tizanidine and hydrocodone-acetaminophen at home.          Debility    Wheelchair dependent  PT/OT.          Essential hypertension    Takes lisinopril and propranolol at home.  Resume.        Dementia without behavioral disturbance              Bipolar affective disorder    Dementia without behavioral disturbance  Takes memantine, risperidone, divalproex, trazodone at home.              VTE Risk Mitigation (From admission, onward)        Ordered     enoxaparin injection 40 mg   Daily      09/21/18 0236     IP VTE HIGH RISK PATIENT  Once      09/21/18 0236              Curtis Jansen MD  Department of Hospital Medicine   [facility]

## 2018-09-22 NOTE — SUBJECTIVE & OBJECTIVE
Interval History: Reports pain all over, mostly abdomen.  Cannot remember having a recent bowel movement.    Review of Systems   Respiratory: Negative for cough and shortness of breath.    Gastrointestinal: Positive for abdominal pain and constipation.     Objective:     Vital Signs (Most Recent):  Temp: 99 °F (37.2 °C) (09/22/18 0740)  Pulse: 106 (09/22/18 0805)  Resp: 18 (09/22/18 0740)  BP: (!) 192/86 (09/22/18 0740)  SpO2: 95 % (09/22/18 0805) Vital Signs (24h Range):  Temp:  [99 °F (37.2 °C)-102.2 °F (39 °C)] 99 °F (37.2 °C)  Pulse:  [] 106  Resp:  [14-22] 18  SpO2:  [94 %-95 %] 95 %  BP: (142-192)/(73-94) 192/86     Weight: 82.8 kg (182 lb 8.7 oz)  Body mass index is 26.19 kg/m².    Intake/Output Summary (Last 24 hours) at 9/22/2018 1033  Last data filed at 9/22/2018 1028  Gross per 24 hour   Intake --   Output 1101 ml   Net -1101 ml      Physical Exam   Constitutional: She appears well-developed. No distress.   shivering   Pulmonary/Chest: Effort normal. No respiratory distress.   Abdominal: Soft. There is no tenderness.   Neurological: She is alert. She displays no seizure activity.   Psychiatric: Cognition and memory are impaired.   Nursing note and vitals reviewed.      Significant Labs: All pertinent labs within the past 24 hours have been reviewed.    Significant Imaging: I have reviewed all pertinent imaging results/findings within the past 24 hours.

## 2018-09-22 NOTE — PT/OT/SLP EVAL
"Speech Language Pathology Evaluation  Bedside Swallow    Patient Name:  Austyn Pedersen   MRN:  [id number]  Admitting Diagnosis: E. coli pyelonephritis    Recommendations:                 General Recommendations:  Dysphagia therapy  Diet recommendations:   , Full liquids, Thin   Aspiration Precautions: 1 bite/sip at a time, Alternating bites/sips, Assistance with meals, Avoid talking while eating, Eliminate distractions, Frequent oral care, HOB to 90 degrees, Meds crushed in puree, Monitor for s/s of aspiration, Remain upright 30 minutes post meal, Small bites/sips and Standard aspiration precautions   General Precautions: Standard, aspiration, fall    History:     Past Medical History:   Diagnosis Date    Anemia     Bipolar disorder     Chronic pain     Dementia     Depression     Extrapyramidal and movement disorder     Glaucoma     Hyperlipidemia     Hypertension     Scabies 05/05/2017       History reviewed. No pertinent surgical history.    Social History: Patient lives at NH.    Chest X-Rays: Completed 9/20/18: "The trachea is unremarkable.  The cardiomediastinal silhouette is prominent.  The hemidiaphragms are unremarkable.  There is no evidence of free air beneath the hemidiaphragms.  There are no pleural effusions.  There is no evidence of a pneumothorax.  There is no evidence of pneumomediastinum.  No airspace opacities are present.  There is stable appearance of minimal increased attenuation of the pulmonary interstitium.  There are degenerative changes in the osseous structures.  The subcutaneous tissues are unremarkable."    Prior diet: regular solids/thin liquids    Subjective     Pt awake and alert--agreeable to BSE.    Pain/Comfort:  Pain Rating 1: 0/10    Objective:     Oral Musculature Evaluation  Oral Musculature: WFL  Dentition: scattered dentition  Secretion Management: adequate  Mucosal Quality: good  Mandibular Strength and Mobility: WFL  Oral Labial Strength and Mobility: WFL  Lingual " Strength and Mobility: WFL  Buccal Strength and Mobility: WFL  Volitional Cough: WFL  Volitional Swallow: WFL  Voice Prior to PO Intake: Strong, clear, dry    Bedside Swallow Eval:   Consistencies Assessed:  · Thin liquids (x10 straw sips)  · Puree (x4 tsp bites)     Oral Phase:   · WFL    Pharyngeal Phase:   · delayed swallow initation  · no overt clinical signs/symptoms of aspiration  · no overt clinical signs/symptoms of pharyngeal dysphagia    Treatment: Discussed swallow study results/recommendations with pt's RN. Swallow precautions posted in pt's room.    Assessment:     Austyn Pedersen is a 78 y.o. female presents with no overt s/s of aspiration or airway threat noted with thin liquids via straw or puree consistencies. Pt presenting with effortful, labored breathing and remains unsafe for advanced consistencies at this time 2/2 high aspiration risk. RECS: Full thin liquid diet for now with 1:1 assist and strict adherence to standard swallow precautions. ST will f/u to ensure safety with diet advancement and to advance diet as tolerated.    Goals:   Multidisciplinary Problems     SLP Goals        Problem: SLP Goal    Goal Priority Disciplines Outcome   SLP Goal     SLP Ongoing (interventions implemented as appropriate)   Description:  SLP Short Term Goals:  1. Patient will successfully participate in ongoing clinical swallow evaluation and tolerate po trials with no overt s/s of aspiration.   2. Pt will safely consume full liquid diet w/ thin liquids w/ no overt s/s of aspiration at supervision level.                    Plan:     · Patient to be seen:  3 x/week   · Plan of Care expires:  10/21/18  · Plan of Care reviewed with:  patient, other (see comments)(RN)   · SLP Follow-Up:  Yes       Discharge recommendations:  nursing facility, basic     Time Tracking:     SLP Treatment Date:   09/22/18  Speech Start Time:  1425  Speech Stop Time:  1440     Speech Total Time (min):  15 min    Billable Minutes: Eval Swallow  and Oral Function 15 minutes    Lolis Elise, CCC-SLP  09/22/2018

## 2018-09-22 NOTE — ASSESSMENT & PLAN NOTE
Right nephrolithiasis  Severe sepsis  Septic encephalopathy  Gram-negative bacteremia  Continue meropenem.  Follow up culture results.  Speech Therapy swallow evaluation.

## 2018-09-22 NOTE — PLAN OF CARE
Problem: SLP Goal  Goal: SLP Goal  SLP Short Term Goals:  1. Patient will successfully participate in ongoing clinical swallow evaluation and tolerate po trials with no overt s/s of aspiration.   2. Pt will safely consume full liquid diet w/ thin liquids w/ no overt s/s of aspiration at supervision level.  Outcome: Ongoing (interventions implemented as appropriate)  9/22/2018: Swallow study completed this PM. No overt s/s of aspiration or airway threat noted with thin liquids via straw or puree consistencies. Pt presenting with effortful, labored breathing and remains unsafe for advanced consistencies at this time 2/2 high aspiration risk. RECS: Full thin liquid diet for now with 1:1 assist and strict adherence to standard swallow precautions. ST will f/u to ensure safety with diet advancement and to advance diet as tolerated.  XIANG Upton. CCC-SLP  Speech-Language Pathologist

## 2018-09-23 PROBLEM — G93.41 SEPTIC ENCEPHALOPATHY: Status: RESOLVED | Noted: 2018-09-21 | Resolved: 2018-09-23

## 2018-09-23 PROBLEM — R65.20 SEVERE SEPSIS: Status: RESOLVED | Noted: 2018-09-21 | Resolved: 2018-09-23

## 2018-09-23 PROBLEM — A41.9 SEVERE SEPSIS: Status: RESOLVED | Noted: 2018-09-21 | Resolved: 2018-09-23

## 2018-09-23 LAB
ANION GAP SERPL CALC-SCNC: 9 MMOL/L
BACTERIA BLD CULT: NORMAL
BACTERIA UR CULT: NORMAL
BASOPHILS # BLD AUTO: 0.02 K/UL
BASOPHILS NFR BLD: 0.2 %
BUN SERPL-MCNC: 23 MG/DL
CALCIUM SERPL-MCNC: 9.7 MG/DL
CHLORIDE SERPL-SCNC: 104 MMOL/L
CO2 SERPL-SCNC: 25 MMOL/L
CREAT SERPL-MCNC: 0.8 MG/DL
DIFFERENTIAL METHOD: ABNORMAL
EOSINOPHIL # BLD AUTO: 0.1 K/UL
EOSINOPHIL NFR BLD: 0.8 %
ERYTHROCYTE [DISTWIDTH] IN BLOOD BY AUTOMATED COUNT: 14 %
EST. GFR  (AFRICAN AMERICAN): >60 ML/MIN/1.73 M^2
EST. GFR  (NON AFRICAN AMERICAN): >60 ML/MIN/1.73 M^2
GLUCOSE SERPL-MCNC: 100 MG/DL
HCT VFR BLD AUTO: 35.1 %
HGB BLD-MCNC: 11.1 G/DL
LYMPHOCYTES # BLD AUTO: 1.1 K/UL
LYMPHOCYTES NFR BLD: 9.2 %
MCH RBC QN AUTO: 29.4 PG
MCHC RBC AUTO-ENTMCNC: 31.6 G/DL
MCV RBC AUTO: 93 FL
MONOCYTES # BLD AUTO: 1.8 K/UL
MONOCYTES NFR BLD: 15.1 %
NEUTROPHILS # BLD AUTO: 8.9 K/UL
NEUTROPHILS NFR BLD: 74.4 %
PLATELET # BLD AUTO: 204 K/UL
PMV BLD AUTO: 11.1 FL
POCT GLUCOSE: 150 MG/DL (ref 70–110)
POTASSIUM SERPL-SCNC: 4.1 MMOL/L
RBC # BLD AUTO: 3.77 M/UL
SODIUM SERPL-SCNC: 138 MMOL/L
WBC # BLD AUTO: 11.96 K/UL

## 2018-09-23 PROCEDURE — 11000001 HC ACUTE MED/SURG PRIVATE ROOM

## 2018-09-23 PROCEDURE — 63600175 PHARM REV CODE 636 W HCPCS: Performed by: NURSE PRACTITIONER

## 2018-09-23 PROCEDURE — 25000003 PHARM REV CODE 250: Performed by: NURSE PRACTITIONER

## 2018-09-23 PROCEDURE — 85025 COMPLETE CBC W/AUTO DIFF WBC: CPT

## 2018-09-23 PROCEDURE — 94761 N-INVAS EAR/PLS OXIMETRY MLT: CPT

## 2018-09-23 PROCEDURE — 25000003 PHARM REV CODE 250: Performed by: HOSPITALIST

## 2018-09-23 PROCEDURE — 36415 COLL VENOUS BLD VENIPUNCTURE: CPT

## 2018-09-23 PROCEDURE — 80048 BASIC METABOLIC PNL TOTAL CA: CPT

## 2018-09-23 PROCEDURE — 27000221 HC OXYGEN, UP TO 24 HOURS

## 2018-09-23 RX ORDER — CIPROFLOXACIN 500 MG/1
500 TABLET ORAL EVERY 12 HOURS
Status: DISCONTINUED | OUTPATIENT
Start: 2018-09-23 | End: 2018-09-24 | Stop reason: HOSPADM

## 2018-09-23 RX ORDER — CIPROFLOXACIN 500 MG/1
500 TABLET ORAL EVERY 12 HOURS
Start: 2018-09-23 | End: 2018-09-24

## 2018-09-23 RX ADMIN — TIZANIDINE 4 MG: 4 TABLET ORAL at 02:09

## 2018-09-23 RX ADMIN — MEROPENEM 1 G: 1 INJECTION, POWDER, FOR SOLUTION INTRAVENOUS at 02:09

## 2018-09-23 RX ADMIN — CIPROFLOXACIN 500 MG: 500 TABLET, FILM COATED ORAL at 01:09

## 2018-09-23 RX ADMIN — LATANOPROST 1 DROP: 50 SOLUTION OPHTHALMIC at 08:09

## 2018-09-23 RX ADMIN — MEMANTINE HYDROCHLORIDE 5 MG: 5 TABLET ORAL at 08:09

## 2018-09-23 RX ADMIN — ENOXAPARIN SODIUM 40 MG: 100 INJECTION SUBCUTANEOUS at 05:09

## 2018-09-23 RX ADMIN — DIVALPROEX SODIUM 500 MG: 125 TABLET, DELAYED RELEASE ORAL at 08:09

## 2018-09-23 RX ADMIN — CIPROFLOXACIN 500 MG: 500 TABLET, FILM COATED ORAL at 08:09

## 2018-09-23 RX ADMIN — FLUTICASONE PROPIONATE 50 MCG: 50 SPRAY, METERED NASAL at 08:09

## 2018-09-23 RX ADMIN — TIMOLOL MALEATE 1 DROP: 5 SOLUTION OPHTHALMIC at 08:09

## 2018-09-23 RX ADMIN — ASPIRIN 81 MG: 81 TABLET, COATED ORAL at 08:09

## 2018-09-23 RX ADMIN — SENNOSIDES AND DOCUSATE SODIUM 1 TABLET: 8.6; 5 TABLET ORAL at 08:09

## 2018-09-23 RX ADMIN — RISPERIDONE 1 MG: 0.25 TABLET ORAL at 08:09

## 2018-09-23 RX ADMIN — TIZANIDINE 4 MG: 4 TABLET ORAL at 08:09

## 2018-09-23 RX ADMIN — DIVALPROEX SODIUM 250 MG: 125 TABLET, DELAYED RELEASE ORAL at 08:09

## 2018-09-23 RX ADMIN — PROPRANOLOL HYDROCHLORIDE 80 MG: 80 CAPSULE, EXTENDED RELEASE ORAL at 08:09

## 2018-09-23 RX ADMIN — LISINOPRIL 20 MG: 20 TABLET ORAL at 08:09

## 2018-09-23 NOTE — PROGRESS NOTES
[facility] Medicine  Progress Note    Patient Name: Austyn Pedersen  MRN: [id number]  Patient Class: IP- Inpatient   Admission Date: 9/20/2018  Length of Stay: 2 days  Attending Physician: Curtis Jansen MD  Primary Care Provider: Primary Doctor No        Subjective:     Principal Problem:E. coli pyelonephritis    HPI:  Austyn Pedersen is a 78 y.o. white woman with hypertension, hyperlipidemia, glaucoma, bipolar disorder, dementia, chronic pain, overactive bladder.  She lives at [facility] in [place], [place].  She is wheelchair dependent.  She eats a no added salt regular diet.     She was taken to [facility] on 9/14/18 for syncope and urine incontinence.  She was hypotensive with blood pressure of 86/48.  Urinalysis was normal.  WBC count was 6,030.  She was given IV fluids.   She returned to [facility] on 9/20/18.  Blood pressure was normal to high this time.  Pulse was 113.  Temperature was 102.3° Fahrenheit.  Urinalysis showed 3+ protein, positive nitrite, >100 RBC/hpf, 50 WBC/hpf, few WBC clumps, and many bacteria.  WBC count was 15,530.  CT showed right nonobstructing kidney stone with pyelonephritis.  She was given ceftriaxone and 2,355 mL of normal saline.  She was admitted to [facility].    Hospital Course:  She was put on meropenem.  Urine culture was checked to help adjust antibiotic.  Temperatures and WBC count decreased.  She had encephalopathy with difficulty swallowing.  Her blood cultures grew the bacteria causing her pyelonephritis.  Sepsis resolved.      Interval History: Nothing new.    Review of Systems   Constitutional: Negative for chills and fever.   Respiratory: Negative for cough and shortness of breath.      Objective:     Vital Signs (Most Recent):  Temp: 99.7 °F (37.6 °C) (09/23/18 0733)  Pulse: 85 (09/23/18 0800)  Resp: 18 (09/23/18 0733)  BP: (!) 168/90  (09/23/18 0716)  SpO2: 97 % (09/23/18 0754) Vital Signs (24h Range):  Temp:  [96.7 °F (35.9 °C)-100.2 °F (37.9 °C)] 99.7 °F (37.6 °C)  Pulse:  [] 85  Resp:  [18-20] 18  SpO2:  [95 %-97 %] 97 %  BP: (106-195)/(53-94) 168/90     Weight: 82.8 kg (182 lb 8.7 oz)  Body mass index is 26.19 kg/m².    Intake/Output Summary (Last 24 hours) at 9/23/2018 1026  Last data filed at 9/23/2018 0900  Gross per 24 hour   Intake 100 ml   Output 488 ml   Net -388 ml      Physical Exam   Constitutional: She appears well-developed. No distress.   Pulmonary/Chest: Effort normal. No respiratory distress.   Abdominal: Soft. There is no tenderness.   Neurological: She is alert. She displays no seizure activity.   Psychiatric: Cognition and memory are impaired.   Nursing note and vitals reviewed.      Significant Labs: All pertinent labs within the past 24 hours have been reviewed.    Significant Imaging: I have reviewed all pertinent imaging results/findings within the past 24 hours.     Assessment/Plan:      * E. coli pyelonephritis    Right nephrolithiasis  Gram-negative bacteremia  Continue meropenem.  Follow up culture results.          Constipation due to opioid therapy    Senna-docusate BID, lactulose and bisacodyl suppository PRN.          Nursing home resident    Return to [facility] on discharge.          Chronic pain    Takes tizanidine and hydrocodone-acetaminophen at home.          Debility    Wheelchair dependent  PT/OT.          Essential hypertension    Takes lisinopril and propranolol at home.  Resume.        Dementia without behavioral disturbance              Bipolar affective disorder    Dementia without behavioral disturbance  Takes memantine, risperidone, divalproex, trazodone at home.              VTE Risk Mitigation (From admission, onward)        Ordered     enoxaparin injection 40 mg  Daily      09/21/18 0236     IP VTE HIGH RISK PATIENT  Once      09/21/18 0236              Curtis Jansen MD  Department of  Hospital Medicine [facility]

## 2018-09-23 NOTE — PLAN OF CARE
Problem: Patient Care Overview  Goal: Plan of Care Review  Outcome: Revised  Pt stable. NO distress noted. POC reviewed with pt. Pt verbalized understanding. Pt remains SR on the monitor. NO true red alarms noted. Pt denied pain. Iv antibiotcs administered as well as stool softener. BM noted.Fall precautions maintained. Bed in lowest position, call light in reach and bed alarm on.

## 2018-09-23 NOTE — SUBJECTIVE & OBJECTIVE
Interval History: Nothing new.    Review of Systems   Constitutional: Negative for chills and fever.   Respiratory: Negative for cough and shortness of breath.      Objective:     Vital Signs (Most Recent):  Temp: 99.7 °F (37.6 °C) (09/23/18 0733)  Pulse: 85 (09/23/18 0800)  Resp: 18 (09/23/18 0733)  BP: (!) 168/90 (09/23/18 0748)  SpO2: 97 % (09/23/18 0754) Vital Signs (24h Range):  Temp:  [96.7 °F (35.9 °C)-100.2 °F (37.9 °C)] 99.7 °F (37.6 °C)  Pulse:  [] 85  Resp:  [18-20] 18  SpO2:  [95 %-97 %] 97 %  BP: (106-195)/(53-94) 168/90     Weight: 82.8 kg (182 lb 8.7 oz)  Body mass index is 26.19 kg/m².    Intake/Output Summary (Last 24 hours) at 9/23/2018 1026  Last data filed at 9/23/2018 0900  Gross per 24 hour   Intake 100 ml   Output 488 ml   Net -388 ml      Physical Exam   Constitutional: She appears well-developed. No distress.   Pulmonary/Chest: Effort normal. No respiratory distress.   Abdominal: Soft. There is no tenderness.   Neurological: She is alert. She displays no seizure activity.   Psychiatric: Cognition and memory are impaired.   Nursing note and vitals reviewed.      Significant Labs: All pertinent labs within the past 24 hours have been reviewed.    Significant Imaging: I have reviewed all pertinent imaging results/findings within the past 24 hours.

## 2018-09-23 NOTE — ASSESSMENT & PLAN NOTE
Right nephrolithiasis  Gram-negative bacteremia  Continue meropenem.  Follow up culture results.

## 2018-09-23 NOTE — PLAN OF CARE
Problem: Patient Care Overview  Goal: Plan of Care Review  Pt lying in bed in NAD. Bed in low and locked position. IV maintained and telemetry monitor on patient. Pt asleep most of the day. Will respond to voice. Safety maintained . Will continue to monitor.

## 2018-09-24 VITALS
BODY MASS INDEX: 28.09 KG/M2 | WEIGHT: 196.19 LBS | OXYGEN SATURATION: 94 % | SYSTOLIC BLOOD PRESSURE: 132 MMHG | RESPIRATION RATE: 18 BRPM | TEMPERATURE: 97 F | DIASTOLIC BLOOD PRESSURE: 66 MMHG | HEIGHT: 70 IN | HEART RATE: 72 BPM

## 2018-09-24 LAB
ANION GAP SERPL CALC-SCNC: 11 MMOL/L
BASOPHILS # BLD AUTO: 0.02 K/UL
BASOPHILS NFR BLD: 0.2 %
BUN SERPL-MCNC: 18 MG/DL
CALCIUM SERPL-MCNC: 9.7 MG/DL
CHLORIDE SERPL-SCNC: 102 MMOL/L
CO2 SERPL-SCNC: 25 MMOL/L
CREAT SERPL-MCNC: 0.8 MG/DL
DIFFERENTIAL METHOD: ABNORMAL
EOSINOPHIL # BLD AUTO: 0.2 K/UL
EOSINOPHIL NFR BLD: 1.7 %
ERYTHROCYTE [DISTWIDTH] IN BLOOD BY AUTOMATED COUNT: 14.1 %
EST. GFR  (AFRICAN AMERICAN): >60 ML/MIN/1.73 M^2
EST. GFR  (NON AFRICAN AMERICAN): >60 ML/MIN/1.73 M^2
GLUCOSE SERPL-MCNC: 138 MG/DL
HCT VFR BLD AUTO: 34.2 %
HGB BLD-MCNC: 11 G/DL
LYMPHOCYTES # BLD AUTO: 1.3 K/UL
LYMPHOCYTES NFR BLD: 12.2 %
MCH RBC QN AUTO: 30 PG
MCHC RBC AUTO-ENTMCNC: 32.2 G/DL
MCV RBC AUTO: 93 FL
MONOCYTES # BLD AUTO: 1.7 K/UL
MONOCYTES NFR BLD: 15.7 %
NEUTROPHILS # BLD AUTO: 7.4 K/UL
NEUTROPHILS NFR BLD: 69.9 %
PLATELET # BLD AUTO: 209 K/UL
PMV BLD AUTO: 11 FL
POTASSIUM SERPL-SCNC: 3.9 MMOL/L
RBC # BLD AUTO: 3.67 M/UL
SODIUM SERPL-SCNC: 138 MMOL/L
WBC # BLD AUTO: 10.62 K/UL

## 2018-09-24 PROCEDURE — 25000003 PHARM REV CODE 250: Performed by: HOSPITALIST

## 2018-09-24 PROCEDURE — 85025 COMPLETE CBC W/AUTO DIFF WBC: CPT

## 2018-09-24 PROCEDURE — 25000003 PHARM REV CODE 250: Performed by: NURSE PRACTITIONER

## 2018-09-24 PROCEDURE — 90472 IMMUNIZATION ADMIN EACH ADD: CPT | Performed by: HOSPITALIST

## 2018-09-24 PROCEDURE — 3E0234Z INTRODUCTION OF SERUM, TOXOID AND VACCINE INTO MUSCLE, PERCUTANEOUS APPROACH: ICD-10-PCS | Performed by: HOSPITALIST

## 2018-09-24 PROCEDURE — 36415 COLL VENOUS BLD VENIPUNCTURE: CPT

## 2018-09-24 PROCEDURE — 90662 IIV NO PRSV INCREASED AG IM: CPT | Performed by: HOSPITALIST

## 2018-09-24 PROCEDURE — 90670 PCV13 VACCINE IM: CPT | Performed by: HOSPITALIST

## 2018-09-24 PROCEDURE — 27000221 HC OXYGEN, UP TO 24 HOURS

## 2018-09-24 PROCEDURE — G0009 ADMIN PNEUMOCOCCAL VACCINE: HCPCS | Performed by: HOSPITALIST

## 2018-09-24 PROCEDURE — G0008 ADMIN INFLUENZA VIRUS VAC: HCPCS | Performed by: HOSPITALIST

## 2018-09-24 PROCEDURE — 94761 N-INVAS EAR/PLS OXIMETRY MLT: CPT

## 2018-09-24 PROCEDURE — 80048 BASIC METABOLIC PNL TOTAL CA: CPT

## 2018-09-24 PROCEDURE — 63600175 PHARM REV CODE 636 W HCPCS: Performed by: HOSPITALIST

## 2018-09-24 PROCEDURE — 90471 IMMUNIZATION ADMIN: CPT | Performed by: HOSPITALIST

## 2018-09-24 RX ORDER — CIPROFLOXACIN 500 MG/1
500 TABLET ORAL EVERY 12 HOURS
Qty: 26 TABLET | Refills: 0 | Status: ON HOLD
Start: 2018-09-24 | End: 2018-09-30 | Stop reason: HOSPADM

## 2018-09-24 RX ORDER — SIMETHICONE 125 MG
125 TABLET,CHEWABLE ORAL 4 TIMES DAILY
Status: DISCONTINUED | OUTPATIENT
Start: 2018-09-24 | End: 2018-09-24 | Stop reason: HOSPADM

## 2018-09-24 RX ADMIN — LISINOPRIL 20 MG: 20 TABLET ORAL at 08:09

## 2018-09-24 RX ADMIN — FLUTICASONE PROPIONATE 50 MCG: 50 SPRAY, METERED NASAL at 09:09

## 2018-09-24 RX ADMIN — DIVALPROEX SODIUM 250 MG: 125 TABLET, DELAYED RELEASE ORAL at 08:09

## 2018-09-24 RX ADMIN — SENNOSIDES AND DOCUSATE SODIUM 1 TABLET: 8.6; 5 TABLET ORAL at 08:09

## 2018-09-24 RX ADMIN — INFLUENZA A VIRUS A/MICHIGAN/45/2015 X-275 (H1N1) ANTIGEN (FORMALDEHYDE INACTIVATED), INFLUENZA A VIRUS A/SINGAPORE/INFIMH-16-0019/2016 IVR-186 (H3N2) ANTIGEN (FORMALDEHYDE INACTIVATED), AND INFLUENZA B VIRUS B/MARYLAND/15/2016 BX-69A (A B/COLORADO/6/2017-LIKE VIRUS) ANTIGEN (FORMALDEHYDE INACTIVATED) 0.5 ML: 60; 60; 60 INJECTION, SUSPENSION INTRAMUSCULAR at 01:09

## 2018-09-24 RX ADMIN — TIZANIDINE 4 MG: 4 TABLET ORAL at 08:09

## 2018-09-24 RX ADMIN — SIMETHICONE 125 MG: 125 TABLET, CHEWABLE ORAL at 01:09

## 2018-09-24 RX ADMIN — RISPERIDONE 1 MG: 0.25 TABLET ORAL at 08:09

## 2018-09-24 RX ADMIN — PNEUMOCOCCAL 13-VALENT CONJUGATE VACCINE 0.5 ML: 2.2; 2.2; 2.2; 2.2; 2.2; 4.4; 2.2; 2.2; 2.2; 2.2; 2.2; 2.2; 2.2 INJECTION, SUSPENSION INTRAMUSCULAR at 01:09

## 2018-09-24 RX ADMIN — MEMANTINE HYDROCHLORIDE 5 MG: 5 TABLET ORAL at 08:09

## 2018-09-24 RX ADMIN — ASPIRIN 81 MG: 81 TABLET, COATED ORAL at 08:09

## 2018-09-24 RX ADMIN — SIMETHICONE 125 MG: 125 TABLET, CHEWABLE ORAL at 10:09

## 2018-09-24 RX ADMIN — CIPROFLOXACIN 500 MG: 500 TABLET, FILM COATED ORAL at 08:09

## 2018-09-24 RX ADMIN — TIMOLOL MALEATE 1 DROP: 5 SOLUTION OPHTHALMIC at 09:09

## 2018-09-24 RX ADMIN — PROPRANOLOL HYDROCHLORIDE 80 MG: 80 CAPSULE, EXTENDED RELEASE ORAL at 08:09

## 2018-09-24 NOTE — PLAN OF CARE
Problem: Patient Care Overview  Goal: Plan of Care Review  Patient on oxygen with documented flow.  Will attempt to wean per O2 order protocol. Will continue to monitor.

## 2018-09-24 NOTE — PLAN OF CARE
Problem: Patient Care Overview  Goal: Plan of Care Review  Outcome: Ongoing (interventions implemented as appropriate)  Received patient upon rounds 1915H, Patient seen in the bed lying on semi-fonseca's position,Patient conscious, disoriented to time and date.  With left IV line gauge 22, flushed patent, with clean and dry dressing, positive with back  Repositioned every two hours, wedge applied. Oxygen saturation of 96-98 percent on oxygen at LPM via NC.  Plan of care reviewed with the patient. Patient noted understanding.NSR on  tele monitor 70-80's. No acute distress noted. No subjective complaint of pain. On full liquid diet. Call bell within reach, pt advised to call for assistance.  Will continue to monitor patient.

## 2018-09-24 NOTE — DISCHARGE INSTRUCTIONS
Urinary Tract Infections (UTIs), Understanding (English) View Edit Remove   Dementia, Understanding (English) View Edit Remove   Hypertension, Established (English) View Edit Remove

## 2018-09-24 NOTE — NURSING
Pt discharged via wheelchair van from [facility]. Liya (caregiver) notified of discharge. Copy of discharge instructions sent with [facility] to give to facility so they may give to Liya.

## 2018-09-24 NOTE — PLAN OF CARE
[facility]  Elliott Garcia MD, [facility]     MD Piper Chaney PA-C Renee Melancon, PA-C Rosanne Zeringue, NP  [address] [place] [id number]  Office: [phone]  Fax: [phone]     NURSING HOME ORDERS    Patient Name: Austyn Pedersen  YOB: [birth year]    Admit to Nursing Home:  Regular Bed     [facility] ([facility] [place] [address])    Diagnoses:  Active Hospital Problems    Diagnosis  POA    *E. coli pyelonephritis [N12, B96.20]  Yes    Constipation due to opioid therapy [K59.03, T40.2X5A]  Yes    Bipolar affective disorder [F31.9]  Yes     Chronic    Dementia without behavioral disturbance [F03.90]  Yes     Chronic    Essential hypertension [I10]  Yes     Chronic    Debility [R53.81]  Yes    Chronic pain [G89.29]  Yes     Chronic    Nursing home resident [Z59.3]  Not Applicable     Chronic     [facility] ([address] [place] [address])      Wheelchair dependent [Z99.3]  Not Applicable     Chronic    Right nephrolithiasis [N20.0]  Yes    E coli bacteremia [R78.81]  Yes      Resolved Hospital Problems    Diagnosis Date Resolved POA    Severe sepsis [A41.9, R65.20] 09/23/2018 Yes    Septic encephalopathy [G93.41] 09/23/2018 Yes     Allergies:Review of patient's allergies indicates:  No Known Allergies    Discharge Procedure Orders   Diet Adult Regular     Vital signs per facility protocol     Skin assessment every shift      Notify Physician     Order Specific Question Answer Comments   Temperature (F) greater than 101    Systolic Blood Pressure SBP greater than or equal to 160    Systolic Blood Pressure SBP less than or equal to 90    Diastolic Blood Pressure DBP greater than or equal to 110    Diastolic Blood Pressure DBP less than or equal to 60    Pulse greater than or equal to 120    Pulse less than or equal to 50    Respirations Rate RR greater than or equal  to 30    Respirations Rate RR less than or equal to 6    Urine output less than 60 over 2 hours   SPO2% less than 90      Full code     LABS:  Per facility protocol    Nursing Precautions:     - Aspiration precautions:             - Total assistance with meals            -  Upright 90 degrees befor during and after meals             -  Suction at bedside          - Fall precautions per nursing home protocol   - Decubitus precautions:        -  for positioning   - Pressure reducing foam mattress   - Turn patient every two hours. Use wedge pillows to anchor patient    MISCELLANEOUS CARE:     Routine Skin for Bedridden Patients:  Apply moisture barrier cream to all    skin folds and wet areas in perineal area daily and after baths and                           all bowel movements.      Medications: The only new medication is ciprofloxacin.   Austyn Pedersen   Home Medication Instructions NICKY:[id number]    Printed on:09/24/18 1246   Medication Information                      aspirin (ECOTRIN) 81 MG EC tablet  Take 81 mg by mouth once daily.             calcium carbonate/vitamin D3 (CALCIUM 600 + D,3, ORAL)  Take by mouth once daily.             carteolol (OCUPRESS) 1 % ophthalmic solution  Place 1 drop into the right eye 2 (two) times daily.             cetirizine (ZYRTEC) 10 MG tablet  Take 10 mg by mouth every evening.             ciprofloxacin HCl (CIPRO) 500 MG tablet  Take 1 tablet (500 mg total) by mouth every 12 (twelve) hours. End date 10/4/18. for 10 days             cranberry fruit concentrate (AZO CRANBERRY ORAL)  Take by mouth 3 (three) times daily.             cyanocobalamin (VITAMIN B-12) 100 MCG tablet  Take 100 mcg by mouth once daily.             divalproex (DEPAKOTE) 250 MG EC tablet  Take 250 mg by mouth once daily.             divalproex (DEPAKOTE) 500 MG TbEC  Take 500 mg by mouth every evening.             docusate sodium (COLACE) 100 MG capsule  Take 100 mg by mouth daily as needed for  Constipation.             estradiol (ESTRACE) 0.01 % (0.1 mg/gram) vaginal cream  Place 1 g vaginally twice a week. Monday and Thursday             fluticasone (FLONASE) 50 mcg/actuation nasal spray  1 spray by Each Nare route once daily.             HYDROcodone-acetaminophen (NORCO) 5-325 mg per tablet  Take 1 tablet by mouth 3 (three) times daily as needed for Pain.             latanoprost 0.005 % ophthalmic solution  Place 1 drop into the right eye every evening.             lisinopril (PRINIVIL,ZESTRIL) 20 MG tablet  Take 20 mg by mouth once daily.             melatonin 3 mg Tab  Take by mouth every evening.             memantine (NAMENDA) 5 MG Tab  Take 5 mg by mouth once daily.             methylcellulose oral powder  Take by mouth once daily. 30cc In orange juice daily             mirabegron (MYRBETRIQ) 50 mg Tb24  Take by mouth once daily.             multivitamin (ONE DAILY MULTIVITAMIN) per tablet  Take 1 tablet by mouth once daily.             propranolol (INNOPRAN XL) 80 mg 24 hr capsule  Take 80 mg by mouth once daily.             risperiDONE (RISPERDAL) 1 MG tablet  Take 1 mg by mouth 2 (two) times daily.             tiZANidine (ZANAFLEX) 2 MG tablet  Take 4 mg by mouth 3 (three) times daily.             traZODone (DESYREL) 50 MG tablet  Take 25 mg by mouth every evening.                       _________________________________  Curtis Jansen MD  09/24/2018

## 2018-09-24 NOTE — DISCHARGE SUMMARY
[facility] Medicine  Discharge Summary      Patient Name: Austyn Pedersen  MRN: [id number]  Admission Date: 9/20/2018  Hospital Length of Stay: 3 days  Discharge Date and Time: 9/24/2018  2:48 PM  Attending Physician: Curtis Jansen MD   Discharging Provider: Curtis Jansen MD  Primary Care Provider: Primary Doctor No      HPI:   Austyn Pedersen is a 78 y.o. white woman with hypertension, hyperlipidemia, glaucoma, bipolar disorder, dementia, chronic pain, overactive bladder.  She lives at [facility] in [place], [place].  She is wheelchair dependent.  She eats a no added salt regular diet.     She was taken to [facility] on 9/14/18 for syncope and urine incontinence.  She was hypotensive with blood pressure of 86/48.  Urinalysis was normal.  WBC count was 6,030.  She was given IV fluids.   She returned to [facility] on 9/20/18.  Blood pressure was normal to high this time.  Pulse was 113.  Temperature was 102.3° Fahrenheit.  Urinalysis showed 3+ protein, positive nitrite, >100 RBC/hpf, 50 WBC/hpf, few WBC clumps, and many bacteria.  WBC count was 15,530.  CT showed right nonobstructing kidney stone with pyelonephritis.  She was given ceftriaxone and 2,355 mL of normal saline.  She was admitted to [facility].      Hospital Course:   She was put on meropenem.  Urine culture was checked to help adjust antibiotic.  Temperatures and WBC count decreased.  She had encephalopathy with difficulty swallowing.  Her blood cultures grew the bacteria causing her pyelonephritis.  Sepsis resolved.  E coli was susceptible to all antibiotics tested, so meropenem was changed to ciprofloxacin, and will complete a 14 day antibiotic course.  She was discharged back to [facility] on 9/24/18.     Consults: Speech Language Pathology    Final Active Diagnoses:    Diagnosis Date Noted POA    PRINCIPAL PROBLEM:  E.  coli pyelonephritis [N12, B96.20] 09/21/2018 Yes    Constipation due to opioid therapy [K59.03, T40.2X5A] 09/22/2018 Yes    Bipolar affective disorder [F31.9] 09/21/2018 Yes     Chronic    Dementia without behavioral disturbance [F03.90] 09/21/2018 Yes     Chronic    Essential hypertension [I10] 09/21/2018 Yes     Chronic    Debility [R53.81] 09/21/2018 Yes    Chronic pain [G89.29] 09/21/2018 Yes     Chronic    Nursing home resident [Z59.3] 09/21/2018 Not Applicable     Chronic    Wheelchair dependent [Z99.3] 09/21/2018 Not Applicable     Chronic    Right nephrolithiasis [N20.0] 09/21/2018 Yes    E coli bacteremia [R78.81] 09/21/2018 Yes      Problems Resolved During this Admission:    Diagnosis Date Noted Date Resolved POA    Severe sepsis [A41.9, R65.20] 09/21/2018 09/23/2018 Yes    Septic encephalopathy [G93.41] 09/21/2018 09/23/2018 Yes       Discharged Condition: good    [facility]position: [facility]    Follow Up:  Follow-up Information     [facility].    Specialties:  Nursing Home Agency, SNF Agency  Contact information:  [facility] [facility] [address] [phone]                 Patient Instructions:      Diet Adult Regular     Vital signs per facility protocol     Skin assessment every shift      Notify Physician     Order Specific Question Answer Comments   Temperature (F) greater than 101    Systolic Blood Pressure SBP greater than or equal to 160    Systolic Blood Pressure SBP less than or equal to 90    Diastolic Blood Pressure DBP greater than or equal to 110    Diastolic Blood Pressure DBP less than or equal to 60    Pulse greater than or equal to 120    Pulse less than or equal to 50    Respirations Rate RR greater than or equal to 30    Respirations Rate RR less than or equal to 6    Urine output less than 60 over 2 hours   SPO2% less than 90      Full code       Significant Diagnostic Studies:   X-Ray Chest AP Portable 9/20/18: FINDINGS:  The trachea is  unremarkable.  The cardiomediastinal silhouette is prominent.  The hemidiaphragms are unremarkable.  There is no evidence of free air beneath the hemidiaphragms.  There are no pleural effusions.  There is no evidence of a pneumothorax.  There is no evidence of pneumomediastinum.  No airspace opacities are present.  There is stable appearance of minimal increased attenuation of the pulmonary interstitium.  There are degenerative changes in the osseous structures.  The subcutaneous tissues are unremarkable.  CT Head Without Contrast 9/21/18: FINDINGS:  There is generalized cerebral volume loss with compensatory sulcal widening and ventricular enlargement.  There is patchy and confluent periventricular and supratentorial white matter hypoattenuation suggestive of sequelae of chronic microvascular ischemic change.  No evidence of acute intracranial hemorrhage or midline shift.  No extra-axial collections identified.  The basal cisterns are patent. The mastoid air cells and paranasal sinuses are clear of acute process. There are postoperative changes of the right globe.  The calvarium appears intact.  Impression: No CT evidence of acute intracranial abnormality.  If there is further concern for acute ischemia, further evaluation could be performed with MRI.  Generalized cerebral volume loss.  Patchy periventricular white matter hypoattenuation suggestive of sequelae of chronic microvascular ischemic change.  CT Abdomen Pelvis With Contrast 9/20/18: FINDINGS:  There is bibasilar atelectasis.  There are scattered coronary artery calcifications.  No significant pericardial effusion.  The liver is normal in size and attenuation with no focal hepatic abnormality.  There is a 2.1 cm calcified stone within the gallbladder lumen.  No significant gallbladder wall thickening is appreciated.  There is no intra-or extrahepatic biliary ductal dilatation.  The stomach, spleen, pancreas, and adrenal glands are unremarkable.  The right  kidney demonstrates heterogeneous and diminished enhancement relative to the left kidney with reduced excretion of contrast material relative to the left kidney.  There is a moderate degree of perinephric inflammatory change with apparent urothelial enhancement.  Findings are concerning for right-sided pyelonephritis.  There is a punctate nonobstructing right lower pole calculus, however no definite obstructing ureteral calculus is identified.  There is a subcentimeter right renal hypodensity which is too small to accurately characterize.  The left kidney demonstrates appropriate enhancement and excretion of IV contrast material.  There is no left-sided hydronephrosis.  Urinary bladder appears within normal limits.  The uterus demonstrates a slightly lobular contour with dystrophic calcifications possibly relating to uterine fibroids.  No significant free fluid within the pelvis.  The abdominal aorta is normal in course and caliber with significant atherosclerotic calcification along its course.  The visualized loops of small and large bowel show no evidence of obstruction or inflammation.  The appendix appears normal in caliber.  There is scattered retained stool throughout the colon which may relate to mild constipation.  There is no free intraperitoneal air or portal venous gas.  There are mild anterior wedge compression deformities of the T8, T9, and L1 vertebral bodies of uncertain chronicity.  There are degenerative changes of the spine.  The extraperitoneal soft tissues are unremarkable.  Impression:  1.  Abnormal heterogeneous and diminished enhancement of the right kidney relative to the left kidney with associated urothelial enhancement and moderate degree of perinephric inflammatory change.  Findings are concerning for right-sided pyelonephritis.  2.  Punctate nonobstructing right lower pole calculus, however no definite obstructing ureteral calculus identified.  3.  Cholelithiasis without convincing CT  evidence of acute cholecystitis.  4.  Mild anterior wedge compression deformities of the T8, T9, and L1 vertebral bodies of uncertain chronicity.  5.  Additional incidental findings as above.  X-Ray Abdomen AP 1 View 9/24/18: FINDINGS:  There are no suspicious calcifications.  Bowel gas pattern is non-obstructive.  No evidence for pneumoperitoneum.  Regional osseous structures are unremarkable.  X-Ray Chest AP Portable 9/24/18: FINDINGS:  Trachea is patent.  Cardiac silhouette appears unchanged.  Hazy opacity in the left lower lung zone likely related to a small effusion, new from prior.  Lungs are otherwise unremarkable.  No pneumothorax or free air below the diaphragm.  Osseous structures are similar to prior.  Impression: Interval development of left lower lung zone opacity that could represent combination of atelectasis and pleural fluid.     Medications:  Reconciled Home Medications:      Medication List      START taking these medications    ciprofloxacin HCl 500 MG tablet  Commonly known as:  CIPRO  Take 1 tablet (500 mg total) by mouth every 12 (twelve) hours. End date 10/4/18. for 10 days        CONTINUE taking these medications    aspirin 81 MG EC tablet  Commonly known as:  ECOTRIN  Take 81 mg by mouth once daily.     AZO CRANBERRY ORAL  Take by mouth 3 (three) times daily.     CALCIUM 600 + D(3) ORAL  Take by mouth once daily.     carteolol 1 % ophthalmic solution  Commonly known as:  OCUPRESS  Place 1 drop into the right eye 2 (two) times daily.     cetirizine 10 MG tablet  Commonly known as:  ZYRTEC  Take 10 mg by mouth every evening.     * divalproex 500 MG Tbec  Commonly known as:  DEPAKOTE  Take 500 mg by mouth every evening.     * divalproex 250 MG EC tablet  Commonly known as:  DEPAKOTE  Take 250 mg by mouth once daily.     docusate sodium 100 MG capsule  Commonly known as:  COLACE  Take 100 mg by mouth daily as needed for Constipation.     estradiol 0.01 % (0.1 mg/gram) vaginal cream  Commonly  known as:  ESTRACE  Place 1 g vaginally twice a week. Monday and Thursday     fluticasone 50 mcg/actuation nasal spray  Commonly known as:  FLONASE  1 spray by Each Nare route once daily.     HYDROcodone-acetaminophen 5-325 mg per tablet  Commonly known as:  NORCO  Take 1 tablet by mouth 3 (three) times daily as needed for Pain.     latanoprost 0.005 % ophthalmic solution  Place 1 drop into the right eye every evening.     lisinopril 20 MG tablet  Commonly known as:  PRINIVIL,ZESTRIL  Take 20 mg by mouth once daily.     melatonin 3 mg Tab  Take by mouth every evening.     memantine 5 MG Tab  Commonly known as:  NAMENDA  Take 5 mg by mouth once daily.     methylcellulose oral powder  Take by mouth once daily. 30cc In orange juice daily     MYRBETRIQ 50 mg Tb24  Generic drug:  mirabegron  Take by mouth once daily.     ONE DAILY MULTIVITAMIN per tablet  Generic drug:  multivitamin  Take 1 tablet by mouth once daily.     propranolol 80 mg 24 hr capsule  Commonly known as:  INNOPRAN XL  Take 80 mg by mouth once daily.     risperiDONE 1 MG tablet  Commonly known as:  RISPERDAL  Take 1 mg by mouth 2 (two) times daily.     tiZANidine 2 MG tablet  Commonly known as:  ZANAFLEX  Take 4 mg by mouth 3 (three) times daily.     traZODone 50 MG tablet  Commonly known as:  DESYREL  Take 25 mg by mouth every evening.     VITAMIN B-12 100 MCG tablet  Generic drug:  cyanocobalamin  Take 100 mcg by mouth once daily.         * This list has 2 medication(s) that are the same as other medications prescribed for you. Read the directions carefully, and ask your doctor or other care provider to review them with you.                Indwelling Lines/Drains at time of discharge:   None    Time spent on the discharge of patient: 35 minutes  Patient was seen and examined on the date of discharge and determined to be suitable for discharge.         Curtis Jansen MD  Department of Hospital Medicine  [facility]

## 2018-09-24 NOTE — PHYSICIAN QUERY
PT Name: Austyn Pedersen  MR #: [id number]     Physician Query Form - Documentation Clarification      CDS: Mily Keller RN     Contact information:  [email or web address]    Phone: [phone]    This form is a permanent document in the medical record.     Query Date: September 24, 2018    By submitting this query, we are merely seeking further clarification of documentation. Please utilize your independent clinical judgment when addressing the question(s) below.    The Medical record reflects the following:    Supporting Clinical Findings Location in Medical Record      Pyelonephritis    presents complaining of generalized body aches since this morning. Patient arrived via EMS from living facility for evaluation after febrile temperatures reported at 102. Patient reports generalized body aches, abdominal pain, and dysuria            H&P 9/20/18     ED note 9/20/18                                                                                      Doctor, Please specify diagnosis or diagnoses associated with above clinical findings.    Provider Use Only      [ x ] Acute Pyelonephritis      [  ] Other ____________________                                                                                                                             [  ] Clinically undetermined

## 2018-09-24 NOTE — NURSING
Form faxed to [facility] for NP to sign. Please have MD retana if available. If not, please fax back to [facility] pod [phone].  Miya Chun CMA     Pt to be discharged. Called report to [facility] and spoke with David. Paperwork to go with patient via [facility]. Awaiting transportation. IV removed. Will continue to monitor.

## 2018-09-24 NOTE — PLAN OF CARE
TN received call from Yessenia at [facility] stating pt accepted back to room 419 A and nurse can call report , [facility] requested TN call [facility]   at [phone] to arrange transport back as that is who they contract with.   TN notified :Liya (caregiver): [phone] and Rob Macedo (Brother) at [phone] of plan to return to NH today and pt being d/c on Cipro oral for 10 days.  TN informed TRICIA Danielle floor nurse to call report and [facility] will be here in about an hour.       09/24/18 1358   Final Note   Assessment Type Final Discharge Note   Discharge Disposition Nurse Fac [facility]   What phone number can be called within the next 1-3 days to see how you are doing after discharge? [phone]   Hospital Follow Up  Appt(s) scheduled? No  (will follow with NH doctor)   Discharge plans and expectations educations in teach back method with documentation complete? Yes  (with caregiver and brother)   Right Care Referral Info   Post Acute Recommendation Other   Referral Type jail NH   Facility Name [facility]

## 2018-09-25 ENCOUNTER — HOSPITAL ENCOUNTER (INPATIENT)
Facility: HOSPITAL | Age: 78
LOS: 4 days | Discharge: HOME | DRG: 689 | End: 2018-09-30
Attending: EMERGENCY MEDICINE | Admitting: HOSPITALIST
Payer: MEDICARE

## 2018-09-25 DIAGNOSIS — N15.1 RENAL ABSCESS, RIGHT: Primary | ICD-10-CM

## 2018-09-25 DIAGNOSIS — I50.9 CHF (CONGESTIVE HEART FAILURE): ICD-10-CM

## 2018-09-25 DIAGNOSIS — B96.20 E. COLI PYELONEPHRITIS: ICD-10-CM

## 2018-09-25 DIAGNOSIS — R78.81 E COLI BACTEREMIA: ICD-10-CM

## 2018-09-25 DIAGNOSIS — N12 E. COLI PYELONEPHRITIS: ICD-10-CM

## 2018-09-25 DIAGNOSIS — R50.9 FEVER: ICD-10-CM

## 2018-09-25 DIAGNOSIS — B96.20 E COLI BACTEREMIA: ICD-10-CM

## 2018-09-25 LAB
ALBUMIN SERPL BCP-MCNC: 2.3 G/DL
ALP SERPL-CCNC: 141 U/L
ALT SERPL W/O P-5'-P-CCNC: 47 U/L
AMYLASE SERPL-CCNC: 61 U/L
ANION GAP SERPL CALC-SCNC: 10 MMOL/L
AST SERPL-CCNC: 38 U/L
BACTERIA #/AREA URNS HPF: ABNORMAL /HPF
BASOPHILS NFR BLD: 0 %
BILIRUB SERPL-MCNC: 0.5 MG/DL
BILIRUB UR QL STRIP: NEGATIVE
BNP SERPL-MCNC: 312 PG/ML
BUN SERPL-MCNC: 17 MG/DL
CALCIUM SERPL-MCNC: 9.7 MG/DL
CHLORIDE SERPL-SCNC: 100 MMOL/L
CK SERPL-CCNC: 19 U/L
CLARITY UR: CLEAR
CO2 SERPL-SCNC: 28 MMOL/L
COLOR UR: YELLOW
CREAT SERPL-MCNC: 0.8 MG/DL
DIFFERENTIAL METHOD: ABNORMAL
EOSINOPHIL NFR BLD: 3 %
ERYTHROCYTE [DISTWIDTH] IN BLOOD BY AUTOMATED COUNT: 14 %
EST. GFR  (AFRICAN AMERICAN): >60 ML/MIN/1.73 M^2
EST. GFR  (NON AFRICAN AMERICAN): >60 ML/MIN/1.73 M^2
GLUCOSE SERPL-MCNC: 137 MG/DL
GLUCOSE UR QL STRIP: NEGATIVE
HCT VFR BLD AUTO: 33.5 %
HGB BLD-MCNC: 10.9 G/DL
HGB UR QL STRIP: ABNORMAL
HYALINE CASTS #/AREA URNS LPF: 1 /LPF
KETONES UR QL STRIP: NEGATIVE
LACTATE SERPL-SCNC: 1.3 MMOL/L
LEUKOCYTE ESTERASE UR QL STRIP: NEGATIVE
LIPASE SERPL-CCNC: 137 U/L
LYMPHOCYTES NFR BLD: 18 %
MAGNESIUM SERPL-MCNC: 1.5 MG/DL
MCH RBC QN AUTO: 29.9 PG
MCHC RBC AUTO-ENTMCNC: 32.5 G/DL
MCV RBC AUTO: 92 FL
MICROSCOPIC COMMENT: ABNORMAL
MONOCYTES NFR BLD: 16 %
NEUTROPHILS NFR BLD: 62 %
NEUTS BAND NFR BLD MANUAL: 1 %
NITRITE UR QL STRIP: NEGATIVE
PH UR STRIP: 7 [PH] (ref 5–8)
PLATELET # BLD AUTO: 277 K/UL
PLATELET BLD QL SMEAR: ABNORMAL
PMV BLD AUTO: 10.7 FL
POTASSIUM SERPL-SCNC: 3.9 MMOL/L
PROT SERPL-MCNC: 6.5 G/DL
PROT UR QL STRIP: ABNORMAL
RBC # BLD AUTO: 3.64 M/UL
RBC #/AREA URNS HPF: 30 /HPF (ref 0–4)
SODIUM SERPL-SCNC: 138 MMOL/L
SP GR UR STRIP: 1.02 (ref 1–1.03)
TROPONIN I SERPL DL<=0.01 NG/ML-MCNC: 0.01 NG/ML
URN SPEC COLLECT METH UR: ABNORMAL
UROBILINOGEN UR STRIP-ACNC: 1 EU/DL
VALPROATE SERPL-MCNC: 12.7 UG/ML
WBC # BLD AUTO: 13.02 K/UL
WBC #/AREA URNS HPF: 6 /HPF (ref 0–5)

## 2018-09-25 PROCEDURE — 25000003 PHARM REV CODE 250: Performed by: EMERGENCY MEDICINE

## 2018-09-25 PROCEDURE — 85007 BL SMEAR W/DIFF WBC COUNT: CPT

## 2018-09-25 PROCEDURE — 82550 ASSAY OF CK (CPK): CPT

## 2018-09-25 PROCEDURE — 87040 BLOOD CULTURE FOR BACTERIA: CPT

## 2018-09-25 PROCEDURE — 96375 TX/PRO/DX INJ NEW DRUG ADDON: CPT

## 2018-09-25 PROCEDURE — 85027 COMPLETE CBC AUTOMATED: CPT

## 2018-09-25 PROCEDURE — 84145 PROCALCITONIN (PCT): CPT

## 2018-09-25 PROCEDURE — 83735 ASSAY OF MAGNESIUM: CPT

## 2018-09-25 PROCEDURE — 83605 ASSAY OF LACTIC ACID: CPT

## 2018-09-25 PROCEDURE — 84484 ASSAY OF TROPONIN QUANT: CPT

## 2018-09-25 PROCEDURE — 80053 COMPREHEN METABOLIC PANEL: CPT

## 2018-09-25 PROCEDURE — 80164 ASSAY DIPROPYLACETIC ACD TOT: CPT

## 2018-09-25 PROCEDURE — 93010 ELECTROCARDIOGRAM REPORT: CPT | Mod: ,,, | Performed by: STUDENT IN AN ORGANIZED HEALTH CARE EDUCATION/TRAINING PROGRAM

## 2018-09-25 PROCEDURE — 0T768DZ DILATION OF RIGHT URETER WITH INTRALUMINAL DEVICE, VIA NATURAL OR ARTIFICIAL OPENING ENDOSCOPIC: ICD-10-PCS | Performed by: STUDENT IN AN ORGANIZED HEALTH CARE EDUCATION/TRAINING PROGRAM

## 2018-09-25 PROCEDURE — BT1DYZZ FLUOROSCOPY OF RIGHT KIDNEY, URETER AND BLADDER USING OTHER CONTRAST: ICD-10-PCS | Performed by: STUDENT IN AN ORGANIZED HEALTH CARE EDUCATION/TRAINING PROGRAM

## 2018-09-25 PROCEDURE — 96361 HYDRATE IV INFUSION ADD-ON: CPT

## 2018-09-25 PROCEDURE — 83690 ASSAY OF LIPASE: CPT

## 2018-09-25 PROCEDURE — 93005 ELECTROCARDIOGRAM TRACING: CPT

## 2018-09-25 PROCEDURE — 99285 EMERGENCY DEPT VISIT HI MDM: CPT | Mod: 25

## 2018-09-25 PROCEDURE — 81000 URINALYSIS NONAUTO W/SCOPE: CPT

## 2018-09-25 PROCEDURE — 83880 ASSAY OF NATRIURETIC PEPTIDE: CPT

## 2018-09-25 PROCEDURE — 82150 ASSAY OF AMYLASE: CPT

## 2018-09-25 PROCEDURE — 96365 THER/PROPH/DIAG IV INF INIT: CPT

## 2018-09-25 RX ORDER — ACETAMINOPHEN 500 MG
1000 TABLET ORAL
Status: DISCONTINUED | OUTPATIENT
Start: 2018-09-25 | End: 2018-09-25

## 2018-09-25 RX ORDER — ACETAMINOPHEN 650 MG/1
650 SUPPOSITORY RECTAL
Status: COMPLETED | OUTPATIENT
Start: 2018-09-25 | End: 2018-09-25

## 2018-09-25 RX ADMIN — SODIUM CHLORIDE 1000 ML: 0.9 INJECTION, SOLUTION INTRAVENOUS at 09:09

## 2018-09-25 RX ADMIN — ACETAMINOPHEN 650 MG: 650 SUPPOSITORY RECTAL at 08:09

## 2018-09-25 RX ADMIN — IOHEXOL 100 ML: 350 INJECTION, SOLUTION INTRAVENOUS at 11:09

## 2018-09-26 PROBLEM — I50.32 CHRONIC DIASTOLIC (CONGESTIVE) HEART FAILURE: Status: ACTIVE | Noted: 2018-09-26

## 2018-09-26 PROBLEM — N15.1 RENAL ABSCESS, RIGHT: Status: ACTIVE | Noted: 2018-09-26

## 2018-09-26 LAB
BACTERIA UR CULT: NO GROWTH
DIASTOLIC DYSFUNCTION: YES
MITRAL VALVE MOBILITY: NORMAL
MITRAL VALVE REGURGITATION: ABNORMAL
PROCALCITONIN SERPL IA-MCNC: 0.6 NG/ML
RETIRED EF AND QEF - SEE NOTES: 65 (ref 55–65)

## 2018-09-26 PROCEDURE — 63600175 PHARM REV CODE 636 W HCPCS: Performed by: EMERGENCY MEDICINE

## 2018-09-26 PROCEDURE — 25000003 PHARM REV CODE 250: Performed by: EMERGENCY MEDICINE

## 2018-09-26 PROCEDURE — 25000003 PHARM REV CODE 250: Performed by: PHYSICIAN ASSISTANT

## 2018-09-26 PROCEDURE — 63600175 PHARM REV CODE 636 W HCPCS: Performed by: HOSPITALIST

## 2018-09-26 PROCEDURE — 63600175 PHARM REV CODE 636 W HCPCS: Performed by: PHYSICIAN ASSISTANT

## 2018-09-26 PROCEDURE — 93306 TTE W/DOPPLER COMPLETE: CPT

## 2018-09-26 PROCEDURE — 93306 TTE W/DOPPLER COMPLETE: CPT | Mod: 26,,, | Performed by: STUDENT IN AN ORGANIZED HEALTH CARE EDUCATION/TRAINING PROGRAM

## 2018-09-26 PROCEDURE — 25000003 PHARM REV CODE 250: Performed by: HOSPITALIST

## 2018-09-26 PROCEDURE — 87086 URINE CULTURE/COLONY COUNT: CPT

## 2018-09-26 PROCEDURE — 27000221 HC OXYGEN, UP TO 24 HOURS

## 2018-09-26 PROCEDURE — 25000242 PHARM REV CODE 250 ALT 637 W/ HCPCS: Performed by: HOSPITALIST

## 2018-09-26 PROCEDURE — 99284 EMERGENCY DEPT VISIT MOD MDM: CPT | Mod: ,,, | Performed by: STUDENT IN AN ORGANIZED HEALTH CARE EDUCATION/TRAINING PROGRAM

## 2018-09-26 PROCEDURE — 11000001 HC ACUTE MED/SURG PRIVATE ROOM

## 2018-09-26 PROCEDURE — 25500020 PHARM REV CODE 255: Performed by: EMERGENCY MEDICINE

## 2018-09-26 PROCEDURE — 94761 N-INVAS EAR/PLS OXIMETRY MLT: CPT

## 2018-09-26 RX ORDER — PNV NO.95/FERROUS FUM/FOLIC AC 28MG-0.8MG
100 TABLET ORAL DAILY
Status: DISCONTINUED | OUTPATIENT
Start: 2018-09-26 | End: 2018-09-30 | Stop reason: HOSPADM

## 2018-09-26 RX ORDER — LISINOPRIL 20 MG/1
20 TABLET ORAL DAILY
Status: DISCONTINUED | OUTPATIENT
Start: 2018-09-26 | End: 2018-09-30 | Stop reason: HOSPADM

## 2018-09-26 RX ORDER — HYDROCODONE BITARTRATE AND ACETAMINOPHEN 5; 325 MG/1; MG/1
1 TABLET ORAL EVERY 6 HOURS PRN
Status: DISCONTINUED | OUTPATIENT
Start: 2018-09-26 | End: 2018-09-30 | Stop reason: HOSPADM

## 2018-09-26 RX ORDER — FLUTICASONE PROPIONATE 50 MCG
1 SPRAY, SUSPENSION (ML) NASAL DAILY
Status: DISCONTINUED | OUTPATIENT
Start: 2018-09-26 | End: 2018-09-30 | Stop reason: HOSPADM

## 2018-09-26 RX ORDER — LABETALOL HYDROCHLORIDE 5 MG/ML
10 INJECTION, SOLUTION INTRAVENOUS
Status: COMPLETED | OUTPATIENT
Start: 2018-09-26 | End: 2018-09-26

## 2018-09-26 RX ORDER — DIVALPROEX SODIUM 125 MG/1
500 TABLET, DELAYED RELEASE ORAL NIGHTLY
Status: DISCONTINUED | OUTPATIENT
Start: 2018-09-26 | End: 2018-09-30 | Stop reason: HOSPADM

## 2018-09-26 RX ORDER — LISINOPRIL 10 MG/1
20 TABLET ORAL
Status: COMPLETED | OUTPATIENT
Start: 2018-09-26 | End: 2018-09-26

## 2018-09-26 RX ORDER — ASPIRIN 81 MG/1
81 TABLET ORAL DAILY
Status: DISCONTINUED | OUTPATIENT
Start: 2018-09-26 | End: 2018-09-30 | Stop reason: HOSPADM

## 2018-09-26 RX ORDER — DIVALPROEX SODIUM 125 MG/1
250 TABLET, DELAYED RELEASE ORAL DAILY
Status: DISCONTINUED | OUTPATIENT
Start: 2018-09-26 | End: 2018-09-30 | Stop reason: HOSPADM

## 2018-09-26 RX ORDER — MEMANTINE HYDROCHLORIDE 5 MG/1
5 TABLET ORAL DAILY
Status: DISCONTINUED | OUTPATIENT
Start: 2018-09-26 | End: 2018-09-30 | Stop reason: HOSPADM

## 2018-09-26 RX ORDER — ACETAMINOPHEN 325 MG/1
650 TABLET ORAL EVERY 8 HOURS PRN
Status: DISCONTINUED | OUTPATIENT
Start: 2018-09-26 | End: 2018-09-30 | Stop reason: HOSPADM

## 2018-09-26 RX ORDER — CALCIUM CARBONATE/VITAMIN D3 600MG-5MCG
1 TABLET ORAL DAILY
Status: DISCONTINUED | OUTPATIENT
Start: 2018-09-26 | End: 2018-09-26

## 2018-09-26 RX ORDER — ENOXAPARIN SODIUM 100 MG/ML
40 INJECTION SUBCUTANEOUS EVERY 24 HOURS
Status: DISCONTINUED | OUTPATIENT
Start: 2018-09-26 | End: 2018-09-30 | Stop reason: HOSPADM

## 2018-09-26 RX ORDER — CALCIUM CARBONATE 200(500)MG
1500 TABLET,CHEWABLE ORAL DAILY
Status: DISCONTINUED | OUTPATIENT
Start: 2018-09-26 | End: 2018-09-30 | Stop reason: HOSPADM

## 2018-09-26 RX ORDER — LATANOPROST 50 UG/ML
1 SOLUTION/ DROPS OPHTHALMIC NIGHTLY
Status: DISCONTINUED | OUTPATIENT
Start: 2018-09-26 | End: 2018-09-30 | Stop reason: HOSPADM

## 2018-09-26 RX ORDER — DOCUSATE SODIUM 100 MG/1
100 CAPSULE, LIQUID FILLED ORAL DAILY PRN
Status: DISCONTINUED | OUTPATIENT
Start: 2018-09-26 | End: 2018-09-30 | Stop reason: HOSPADM

## 2018-09-26 RX ORDER — ENOXAPARIN SODIUM 100 MG/ML
30 INJECTION SUBCUTANEOUS EVERY 24 HOURS
Status: DISCONTINUED | OUTPATIENT
Start: 2018-09-26 | End: 2018-09-26

## 2018-09-26 RX ORDER — LANOLIN ALCOHOL/MO/W.PET/CERES
400 CREAM (GRAM) TOPICAL ONCE
Status: COMPLETED | OUTPATIENT
Start: 2018-09-26 | End: 2018-09-26

## 2018-09-26 RX ORDER — PROPRANOLOL HYDROCHLORIDE 60 MG/1
60 CAPSULE, EXTENDED RELEASE ORAL DAILY
Status: DISCONTINUED | OUTPATIENT
Start: 2018-09-26 | End: 2018-09-30 | Stop reason: HOSPADM

## 2018-09-26 RX ORDER — RISPERIDONE 0.25 MG/1
1 TABLET ORAL 2 TIMES DAILY
Status: DISCONTINUED | OUTPATIENT
Start: 2018-09-26 | End: 2018-09-30 | Stop reason: HOSPADM

## 2018-09-26 RX ORDER — FUROSEMIDE 10 MG/ML
20 INJECTION INTRAMUSCULAR; INTRAVENOUS 2 TIMES DAILY
Status: DISCONTINUED | OUTPATIENT
Start: 2018-09-26 | End: 2018-09-27

## 2018-09-26 RX ADMIN — LATANOPROST 1 DROP: 50 SOLUTION OPHTHALMIC at 09:09

## 2018-09-26 RX ADMIN — ASPIRIN 81 MG: 81 TABLET, COATED ORAL at 09:09

## 2018-09-26 RX ADMIN — HYDROCODONE BITARTRATE AND ACETAMINOPHEN 1 TABLET: 5; 325 TABLET ORAL at 03:09

## 2018-09-26 RX ADMIN — TRAZODONE HYDROCHLORIDE 25 MG: 50 TABLET ORAL at 10:09

## 2018-09-26 RX ADMIN — MAGNESIUM OXIDE 400 MG: 400 TABLET ORAL at 09:09

## 2018-09-26 RX ADMIN — ENOXAPARIN SODIUM 40 MG: 100 INJECTION SUBCUTANEOUS at 04:09

## 2018-09-26 RX ADMIN — RISPERIDONE 1 MG: 0.25 TABLET, FILM COATED ORAL at 10:09

## 2018-09-26 RX ADMIN — DIVALPROEX SODIUM 500 MG: 125 TABLET, DELAYED RELEASE ORAL at 10:09

## 2018-09-26 RX ADMIN — PIPERACILLIN AND TAZOBACTAM 4.5 G: 4; .5 INJECTION, POWDER, LYOPHILIZED, FOR SOLUTION INTRAVENOUS; PARENTERAL at 09:09

## 2018-09-26 RX ADMIN — LISINOPRIL 20 MG: 10 TABLET ORAL at 01:09

## 2018-09-26 RX ADMIN — FUROSEMIDE 20 MG: 10 INJECTION, SOLUTION INTRAMUSCULAR; INTRAVENOUS at 05:09

## 2018-09-26 RX ADMIN — MEMANTINE HYDROCHLORIDE 5 MG: 5 TABLET ORAL at 09:09

## 2018-09-26 RX ADMIN — PIPERACILLIN AND TAZOBACTAM 4.5 G: 4; .5 INJECTION, POWDER, LYOPHILIZED, FOR SOLUTION INTRAVENOUS; PARENTERAL at 04:09

## 2018-09-26 RX ADMIN — LISINOPRIL 20 MG: 20 TABLET ORAL at 09:09

## 2018-09-26 RX ADMIN — DIVALPROEX SODIUM 250 MG: 250 TABLET, DELAYED RELEASE ORAL at 09:09

## 2018-09-26 RX ADMIN — FLUTICASONE PROPIONATE 50 MCG: 50 SPRAY, METERED NASAL at 09:09

## 2018-09-26 RX ADMIN — VITAMIN B12 0.1 MG ORAL TABLET 100 MCG: 0.1 TABLET ORAL at 09:09

## 2018-09-26 RX ADMIN — HYDROCODONE BITARTRATE AND ACETAMINOPHEN 1 TABLET: 5; 325 TABLET ORAL at 09:09

## 2018-09-26 RX ADMIN — PIPERACILLIN AND TAZOBACTAM 4.5 G: 4; .5 INJECTION, POWDER, LYOPHILIZED, FOR SOLUTION INTRAVENOUS; PARENTERAL at 01:09

## 2018-09-26 RX ADMIN — LABETALOL HYDROCHLORIDE 10 MG: 5 INJECTION, SOLUTION INTRAVENOUS at 01:09

## 2018-09-26 RX ADMIN — RISPERIDONE 1 MG: 0.25 TABLET, FILM COATED ORAL at 09:09

## 2018-09-26 RX ADMIN — PROPRANOLOL HYDROCHLORIDE 60 MG: 60 CAPSULE, EXTENDED RELEASE ORAL at 09:09

## 2018-09-26 NOTE — ASSESSMENT & PLAN NOTE
Dementia without behavioral disturbance  Pt not fully oriented and somewhat upset on admission.  -Continue home depakote and risperidone  -Continue home namenda  -Delirium precautions

## 2018-09-26 NOTE — ASSESSMENT & PLAN NOTE
78 y.o. female with multiple comorbidities including HTN, bipolar disorder, dementia residing at a nursing home facility [place] with right sided pyelonephritis without an obstructing stone on both CT scans from  9/20/18 and the one from yesterday 9/25/18.     CT from 9/25/18 demonstrated findings consistent with a persistent right sided pyelonephritis with the development of a 1cm right lower pole possible abscess development. Right ureter again demonstrates a thickened appearance without any obstructing calculus    Plan:  1. Agree with IV zosyn.  2. Right renal abscess small 1cm, typically IR percutaneous drainage is recommended for larger abscesses.  3. Urine and blood cultures demonstrate no growth of bacteria. May benefit from IV antibiotic therapy continuation as patient failed outpatient oral cipro which is the typical recommended regimen for pyelonephritis.  4. Recommend waller catheter placement to aid in drainage of urine from bladder.   5. Will continue to follow the patient, if she develops a fever or her WBC count is persistently elevated despite IV antibiotics, will plan for a cystoscopy, right ureteral stent to see if that may aid in expeditious recovery from pyelonephritis.

## 2018-09-26 NOTE — ED NOTES
Incontinent of urine. Skin care and linens changed. Repositioned in bed for comfort. Pt. Requesting blanket and lights off. Pt. Provided with blanket and heels floated on pillows.

## 2018-09-26 NOTE — ED NOTES
Pt transferred to room 24. Report received from TRICIA Ness.   Pt sleeping in bed without any signs of distress.

## 2018-09-26 NOTE — PROGRESS NOTES
Pharmacist Renal Dose Adjustment Note    Austyn Pedersen is a 78 y.o. female being treated with the medication Zosyn    Patient Data:    Vital Signs (Most Recent):  Temp: 99.3 °F (37.4 °C) (09/25/18 2301)  Pulse: 75 (09/26/18 0133)  Resp: 20 (09/26/18 0133)  BP: (!) 190/86 (09/26/18 0133)  SpO2: 98 % (09/26/18 0133)   Vital Signs (72h Range):  Temp:  [96.8 °F (36 °C)-101 °F (38.3 °C)]   Pulse:  [63-92]   Resp:  [16-23]   BP: (102-194)/(58-94)   SpO2:  [91 %-98 %]      Recent Labs   Lab  09/23/18   0455  09/24/18   0447  09/25/18 2050   CREATININE  0.8  0.8  0.8     Creatinine clearance cannot be calculated (Unknown ideal weight.)    Medication: Zosyn 4.5 gm IV q6h will be changed to Zosyn 4.5 gm IV q8h.    Pharmacist's Name: Tim Lainez  Pharmacist's Extension: [phone]

## 2018-09-26 NOTE — SUBJECTIVE & OBJECTIVE
Past Medical History:   Diagnosis Date    Anemia     Bipolar disorder     Chronic pain     Dementia     Depression     Extrapyramidal and movement disorder     Glaucoma     Hyperlipidemia     Hypertension     Scabies 05/05/2017       History reviewed. No pertinent surgical history.    Review of patient's allergies indicates:  No Known Allergies    No current facility-administered medications on file prior to encounter.      Current Outpatient Medications on File Prior to Encounter   Medication Sig    aspirin (ECOTRIN) 81 MG EC tablet Take 81 mg by mouth once daily.    calcium carbonate/vitamin D3 (CALCIUM 600 + D,3, ORAL) Take by mouth once daily.    carteolol (OCUPRESS) 1 % ophthalmic solution Place 1 drop into the right eye 2 (two) times daily.    cetirizine (ZYRTEC) 10 MG tablet Take 10 mg by mouth every evening.    ciprofloxacin HCl (CIPRO) 500 MG tablet Take 1 tablet (500 mg total) by mouth every 12 (twelve) hours. End date 10/4/18. for 10 days    cranberry fruit concentrate (AZO CRANBERRY ORAL) Take by mouth 3 (three) times daily.    cyanocobalamin (VITAMIN B-12) 100 MCG tablet Take 100 mcg by mouth once daily.    divalproex (DEPAKOTE) 250 MG EC tablet Take 250 mg by mouth once daily.    divalproex (DEPAKOTE) 500 MG TbEC Take 500 mg by mouth every evening.    docusate sodium (COLACE) 100 MG capsule Take 100 mg by mouth daily as needed for Constipation.    estradiol (ESTRACE) 0.01 % (0.1 mg/gram) vaginal cream Place 1 g vaginally twice a week. Monday and Thursday    fluticasone (FLONASE) 50 mcg/actuation nasal spray 1 spray by Each Nare route once daily.    HYDROcodone-acetaminophen (NORCO) 5-325 mg per tablet Take 1 tablet by mouth 3 (three) times daily as needed for Pain.    latanoprost 0.005 % ophthalmic solution Place 1 drop into the right eye every evening.    lisinopril (PRINIVIL,ZESTRIL) 20 MG tablet Take 20 mg by mouth once daily.    melatonin 3 mg Tab Take by mouth every  evening.    memantine (NAMENDA) 5 MG Tab Take 5 mg by mouth once daily.    methylcellulose oral powder Take by mouth once daily. 30cc In orange juice daily    mirabegron (MYRBETRIQ) 50 mg Tb24 Take by mouth once daily.    multivitamin (ONE DAILY MULTIVITAMIN) per tablet Take 1 tablet by mouth once daily.    propranolol (INNOPRAN XL) 80 mg 24 hr capsule Take 80 mg by mouth once daily.    risperiDONE (RISPERDAL) 1 MG tablet Take 1 mg by mouth 2 (two) times daily.    tiZANidine (ZANAFLEX) 2 MG tablet Take 4 mg by mouth 3 (three) times daily.    traZODone (DESYREL) 50 MG tablet Take 25 mg by mouth every evening.     Family History     Pt is unable to answer        Tobacco Use    Smoking status: Unknown If Ever Smoked   Substance and Sexual Activity    Alcohol use: Not on file    Drug use: Not on file    Sexual activity: Not on file     Review of Systems   Unable to perform ROS: Dementia   Gastrointestinal: Positive for abdominal pain.     Objective:     Vital Signs (Most Recent):  Temp: 99.1 °F (37.3 °C) (09/26/18 0925)  Pulse: 80 (09/26/18 0725)  Resp: (!) 29 (09/26/18 0725)  BP: (!) 168/80 (09/26/18 0725)  SpO2: 97 % (09/26/18 0725) Vital Signs (24h Range):  Temp:  [99.1 °F (37.3 °C)-101 °F (38.3 °C)] 99.1 °F (37.3 °C)  Pulse:  [69-86] 80  Resp:  [16-29] 29  SpO2:  [91 %-100 %] 97 %  BP: (166-190)/(77-92) 168/80     Weight: 89 kg (196 lb 3.4 oz)  Body mass index is 28.15 kg/m².    Physical Exam   Constitutional: She appears well-developed and well-nourished.   Cardiovascular: Normal rate and regular rhythm.   Murmur heard.  Pulmonary/Chest: Effort normal.   Abdominal: She exhibits distension. There is tenderness (right). There is CVA tenderness.   Musculoskeletal: She exhibits no edema.   Neurological: She is alert.   Skin: Skin is warm and dry. Capillary refill takes less than 2 seconds. She is not diaphoretic.           Significant Labs: All pertinent labs within the past 24 hours have been  reviewed.    Significant Imaging: I have reviewed all pertinent imaging results/findings within the past 24 hours.

## 2018-09-26 NOTE — ED NOTES
Pt. Sent from [facility] for eval of abdominal pain and fever. Pt. Was discharged from here yesterday after hospitalization for urosepsis. On arrival the pt. C/o diffuse abdominal pain with more tenderness to ruq upon palpation. Abdomen is mildly distended but soft, diffusely tender to palpation. Gurgling bowel sounds noted. Pt. Denies n/v. Denies cp or sob. Pt. Is a/o to person and surroundings and states she doesn't feel well. Pt. Does not appear in distress.

## 2018-09-26 NOTE — ED PROVIDER NOTES
Encounter Date: 9/25/2018    SCRIBE #1 NOTE: I, Elaina Engle, am scribing for, and in the presence of,  Dr. Hinds. I have scribed the entire note.       History     Chief Complaint   Patient presents with    Fever     accompanied by chest pain (relieved at this time) and abdominal pain; pts abdomen is distended; was dishcharged yesterday after being treated for sepsis; was given 1 nitro sL and 1 324 mg of ASA prior to arrival be nursing home; pt resides at [facility]; alert and oriented     Austyn Pedersen is a 78 y.o. female who  has a past medical history of Anemia, Bipolar disorder, Chronic pain, Dementia, Depression, Extrapyramidal and movement disorder, Glaucoma, Hyperlipidemia, Hypertension, and Scabies (05/05/2017).    The patient presents to the ED due to fever. As per EMS the patient's symptoms was a few hours ago. The patient was recently discharged to [facility] following admission yesterday. She was admitted secondary to sepsis. Today the patient was found to have a temperature of 101 in the ED. EMS report they were initially called for chest pain. The patient was given 1 sublingual Nitro and 324 mg of aspirin via EMS. She no longer has any chest pain. The patient states she currently has abdominal pain. She denies any diarrhea, nausea, or vomiting.       The history is provided by the patient.     Review of patient's allergies indicates:  No Known Allergies  Past Medical History:   Diagnosis Date    Anemia     Bipolar disorder     Chronic pain     Dementia     Depression     Extrapyramidal and movement disorder     Glaucoma     Hyperlipidemia     Hypertension     Scabies 05/05/2017     History reviewed. No pertinent surgical history.  History reviewed. No pertinent family history.  Social History     Tobacco Use    Smoking status: Unknown If Ever Smoked   Substance Use Topics    Alcohol use: Not on file    Drug use: Not on file     Review of Systems   Constitutional:  Positive for fever. Negative for chills.   HENT: Negative for congestion, rhinorrhea and sore throat.    Eyes: Negative for redness and visual disturbance.   Respiratory: Negative for cough, shortness of breath and wheezing.    Cardiovascular: Negative for chest pain and palpitations.   Gastrointestinal: Positive for abdominal pain. Negative for diarrhea, nausea and vomiting.   Genitourinary: Negative for dysuria and hematuria.   Musculoskeletal: Negative for back pain, myalgias and neck pain.   Skin: Negative for rash.   Neurological: Negative for dizziness, weakness and light-headedness.   Psychiatric/Behavioral: Negative for confusion.       Physical Exam     Initial Vitals [09/25/18 2002]   BP Pulse Resp Temp SpO2   (!) 179/89 86 16 (!) 101 °F (38.3 °C) (!) 91 %      MAP       --         Physical Exam    Nursing note and vitals reviewed.  Constitutional: She appears well-developed and well-nourished. She is not diaphoretic.   HENT:   Head: Normocephalic and atraumatic.   Mouth/Throat: Oropharynx is clear and moist.   Eyes: Conjunctivae and EOM are normal. Pupils are equal, round, and reactive to light.   Neck: Normal range of motion.   Cardiovascular: Normal rate and regular rhythm. Exam reveals no gallop and no friction rub.    Murmur heard.  Pulmonary/Chest: She has no wheezes. She has no rhonchi. She has no rales.   Diminished breath sounds   Abdominal: Soft. There is tenderness. There is guarding. There is no rebound.   Diffuse abdominal tenderness worse on right. Voluntary guarding   Musculoskeletal: Normal range of motion. She exhibits no edema or tenderness.   No LE pitting edema.    Lymphadenopathy:     She has no cervical adenopathy.   Neurological: She is alert and oriented to person, place, and time. She has normal strength.   Skin: Skin is warm and dry. No rash noted.         ED Course   Procedures  Labs Reviewed   VALPROIC ACID - Abnormal; Notable for the following components:       Result Value     Valproic Acid Lvl 12.7 (*)     All other components within normal limits   CBC W/ AUTO DIFFERENTIAL - Abnormal; Notable for the following components:    WBC 13.02 (*)     RBC 3.64 (*)     Hemoglobin 10.9 (*)     Hematocrit 33.5 (*)     Mono% 16.0 (*)     All other components within normal limits   COMPREHENSIVE METABOLIC PANEL - Abnormal; Notable for the following components:    Glucose 137 (*)     Albumin 2.3 (*)     Alkaline Phosphatase 141 (*)     ALT 47 (*)     All other components within normal limits   B-TYPE NATRIURETIC PEPTIDE - Abnormal; Notable for the following components:     (*)     All other components within normal limits   MAGNESIUM - Abnormal; Notable for the following components:    Magnesium 1.5 (*)     All other components within normal limits   URINALYSIS - Abnormal; Notable for the following components:    Protein, UA 1+ (*)     Occult Blood UA 2+ (*)     All other components within normal limits   CK - Abnormal; Notable for the following components:    CPK 19 (*)     All other components within normal limits   PROCALCITONIN - Abnormal; Notable for the following components:    Procalcitonin 0.60 (*)     All other components within normal limits   LIPASE - Abnormal; Notable for the following components:    Lipase 137 (*)     All other components within normal limits   URINALYSIS MICROSCOPIC - Abnormal; Notable for the following components:    RBC, UA 30 (*)     WBC, UA 6 (*)     All other components within normal limits   CULTURE, URINE   CULTURE, BLOOD   CULTURE, BLOOD   TROPONIN I   LACTIC ACID, PLASMA   AMYLASE     EKG Readings: (Independently Interpreted)   Normal sinus rhythm with a rate of 84. No ST elevations. Normal T waves. No STEMI       Imaging Results          CT Abdomen Pelvis With Contrast (Final result)  Result time 09/25/18 23:46:13    Final result by Sabina Hooks MD (09/25/18 23:46:13)                 Impression:      1. Mild heterogenous enhancement of the right  kidney with abnormal urothelial enhancement involving the right kidney and ureter with surrounding inflammatory change.  Findings may reflect ascending urinary tract infection/pyelonephritis noting similar findings seen on recent CT from 09/20/2018.  Interval development of small hypodense lesion involving the inferior pole of the right kidney which is new from recent CT for which potential small developing abscess not excluded.  2. Cholelithiasis.  3. Small bilateral pleural effusions with bibasilar atelectasis.  4. Additional findings as detailed above.      Electronically signed by: Sabina Hooks MD  Date:    09/25/2018  Time:    23:46             Narrative:    EXAMINATION:  CT ABDOMEN PELVIS WITH CONTRAST    CLINICAL HISTORY:  abdominal pain;    TECHNIQUE:  Low dose axial images, sagittal and coronal reformations were obtained from the lung bases to the pubic symphysis following the IV administration of 100 mL of Omnipaque 350 .  Oral contrast was not given.    COMPARISON:  Recent CT abdomen and pelvis from 09/20/2018.    FINDINGS:  The visualized portion of the heart is unremarkable.  Small bilateral pleural effusions with bibasilar atelectasis are seen.    No significant hepatic abnormalities are identified.  There is no intra-or extrahepatic biliary ductal dilatation.  Prominent calcified stone is seen within the gallbladder.  The stomach, pancreas, spleen, and adrenal glands are unremarkable.    Kidneys are functioning.  There is heterogenous enhancement of the right kidney with abnormal right urothelial enhancement seen involving the right renal pelvis and proximal to mid right ureter similar to recent CT which could reflect pyelonephritis.  Right periureteral inflammatory changes are seen.  Small right renal cyst is visualized, although there has been interval development of new small subcentimeter hypodense lesion involving the inferior pole on the right for which potential developing abscess not  excluded.  Punctate nonobstructing right renal stone is seen.  No evidence of left-sided hydronephrosis.  Left ureter is unremarkable along its course.  Urinary bladder is unremarkable.  Small calcified uterine fibroids are seen.    Appendix is visualized and is unremarkable, noting adjacent inflammatory changes along the right ureteral course.  The visualized loops of small and large bowel show no evidence of obstruction or inflammation.  No free air or free fluid.    Aorta tapers normally prominent atherosclerosis involving the distal abdominal aorta.    No acute osseous abnormality identified.  Stable mild remote appearing compression deformities are seen within the lower thoracic spine and L1 vertebral body.  Subcutaneous soft tissue structures are unremarkable.                               CT Chest Without Contrast (Final result)  Result time 09/25/18 22:49:12    Final result by Sabina Hooks MD (09/25/18 22:49:12)                 Impression:      Small bilateral pleural effusions with bibasilar atelectasis.      Electronically signed by: Sabina Hooks MD  Date:    09/25/2018  Time:    22:49             Narrative:    EXAMINATION:  CT CHEST WITHOUT CONTRAST    CLINICAL HISTORY:  Pleural effusion;    TECHNIQUE:  Low dose axial images, sagittal and coronal reformations were obtained from the thoracic inlet to the lung bases. Contrast was not administered.    COMPARISON:  Recent CT abdomen and pelvis from 09/20/2018.    FINDINGS:  Structures at the base of the neck are unremarkable.  Aorta is non-aneurysmal.  The heart is normal in size without pericardial effusion.  There is no evidence of mediastinal, axillary, or hilar lymph node enlargement.  The esophagus maintains a normal course and caliber.    The trachea and bronchi are patent.  The lungs are symmetrically expanded.  There are small bilateral pleural effusions resulting in volume loss and compressive atelectasis seen within the lower lobes.  Mild  plate atelectasis is also seen within the upper lobes.    The visualized abdominal structures are unremarkable.  Osseous structures demonstrate degenerative change with mild multilevel remote appearing compression deformities seen within the thoracic spine.  Extrathoracic soft tissues are unremarkable.                               X-Ray Chest 1 View (Final result)  Result time 09/25/18 21:21:34    Final result by Sabina Hooks MD (09/25/18 21:21:34)                 Impression:      No acute cardiopulmonary process identified.  No significant pleural effusion seen, however noting the presence of small right-sided pleural effusion on concurrent abdominal ultrasound.      Electronically signed by: Sabina Hooks MD  Date:    09/25/2018  Time:    21:21             Narrative:    EXAMINATION:  XR CHEST 1 VIEW    CLINICAL HISTORY:  fever;    TECHNIQUE:  Single frontal view of the chest was performed.    COMPARISON:  09/24/2018.    FINDINGS:  Cardiac silhouette is normal in size.  Lungs are symmetrically expanded.  No evidence of focal consolidative process or pneumothorax.  No significant pleural effusion visualized on this radiograph, however noting presence of small pleural effusion on the right on concurrent right upper quadrant ultrasound. No acute osseous abnormality identified.                               US Abdomen Limited (Final result)  Result time 09/25/18 21:19:45    Final result by Sabina Hooks MD (09/25/18 21:19:45)                 Impression:      1. Cholelithiasis with gallbladder wall hyperemia.  Negative sonographic Rider sign with otherwise no convincing evidence of acute cholecystitis noting suboptimal evaluation secondary to partial contraction and non NPO status.  2. Right pleural effusion.      Electronically signed by: Sabina Hooks MD  Date:    09/25/2018  Time:    21:19             Narrative:    EXAMINATION:  US ABDOMEN LIMITED    CLINICAL HISTORY:  abdominal  pain;    TECHNIQUE:  Limited ultrasound of the right upper quadrant of the abdomen (including pancreas, liver, gallbladder, common bile duct, and right kidney) was performed.    COMPARISON:  None.    FINDINGS:  The liver is upper limits of normal in size measuring 17.9cm.  Hepatic parenchyma is homogeneous without evidence for masses.  No intra- or extrahepatic biliary ductal dilatation. The common bile duct measures 0.5 cm.  The gallbladder is suboptimally evaluated secondary to partial contraction and non NPO status.  Single gallstone is seen, however patient is unable to rule into left lateral decubitus or prone positions to assess for stone mobility.  There is mild gallbladder wall hyperemia.  No evidence of pericholecystic fluid.  Sonographic Rider's sign is negative. The visualized portions of the pancreas and IVC appear normal. The right kidney measures 11.7 cm. No ascites.  Right-sided pleural effusion is seen.                                 Medical Decision Making:   Independently Interpreted Test(s):   I have ordered and independently interpreted EKG Reading(s) - see prior notes  Clinical Tests:   Lab Tests: Ordered and Reviewed  Radiological Study: Ordered and Reviewed  Medical Tests: Ordered and Reviewed  ED Management:  12:56 AM Case discussed with Dr. Fabian, who will accept the patient for admission.                      Clinical Impression:     1. Renal abscess, right    2. Fever           I, Dr. Eren Hinds, personally performed the services described in this documentation. All medical record entries made by the scribe were at my direction and in my presence. I have reviewed the chart and agree that the record reflects my personal performance and is accurate and complete. Eren Hinds MD.  1:32 AM 09/26/2018                     Eren Hinds MD  09/26/18 0132

## 2018-09-26 NOTE — SUBJECTIVE & OBJECTIVE
Past Medical History:   Diagnosis Date    Anemia     Bipolar disorder     Chronic pain     Dementia     Depression     Extrapyramidal and movement disorder     Glaucoma     Hyperlipidemia     Hypertension     Scabies 05/05/2017       History reviewed. No pertinent surgical history.    Review of patient's allergies indicates:  No Known Allergies    Family History     None          Tobacco Use    Smoking status: Unknown If Ever Smoked   Substance and Sexual Activity    Alcohol use: Not on file    Drug use: Not on file    Sexual activity: Not on file       Review of Systems   Unable to perform ROS: Other       Objective:     Temp:  [99.1 °F (37.3 °C)-101 °F (38.3 °C)] 99.1 °F (37.3 °C)  Pulse:  [69-86] 80  Resp:  [16-29] 29  SpO2:  [91 %-100 %] 97 %  BP: (166-190)/(77-92) 168/80     Body mass index is 28.15 kg/m².            Drains          None          Physical Exam   Constitutional: She is oriented to person, place, and time. She appears well-developed and well-nourished.   HENT:   Head: Normocephalic and atraumatic.   Eyes: EOM are normal. Pupils are equal, round, and reactive to light.   Neck: Normal range of motion. Neck supple.   Pulmonary/Chest: Effort normal. No respiratory distress.   Abdominal:   Patient refused exam   Genitourinary:   Genitourinary Comments: No urethral waller   Musculoskeletal:   Patient refused examination   Neurological: She is alert and oriented to person, place, and time.   Skin: Skin is warm and dry.     Psychiatric: She has a normal mood and affect. Her behavior is normal. Thought content normal.       Significant Labs:    BMP:  Recent Labs   Lab  09/23/18 0455 09/24/18 0447 09/25/18 2050   NA  138  138  138   K  4.1  3.9  3.9   CL  104  102  100   CO2  25  25  28   BUN  23 18  17   CREATININE  0.8  0.8  0.8   CALCIUM  9.7  9.7  9.7       CBC:  Recent Labs   Lab  09/23/18 0455 09/24/18 0447 09/25/18 2050   WBC  11.96  10.62  13.02*   HGB  11.1*  11.0*   10.9*   HCT  35.1*  34.2*  33.5*   PLT  204  209  277       All pertinent labs results from the past 24 hours have been reviewed.  Recent Lab Results       09/26/18  0132 09/25/18 2126 09/25/18 2051 09/25/18 2050 09/25/18 2033      Procalcitonin    0.60  Comment:  Please re-baseline procalcitonin if a patient is transferred from  other facilities to [facility].  A concentration < 0.25 ng/mL represents a low risk bacterial   infection.  Procalcitonin may not be accurate among patients with localized   infection, recent trauma or major surgery, immunosuppressed state,   invasive fungal infection, renal dysfunction. Decisions regarding   initiation or continuation of antibiotic therapy should not be based   solely on procalcitonin levels.        Albumin    2.3      Alkaline Phosphatase    141      ALT    47      Amylase    61      Anion Gap    10      Appearance, UA     Clear     AST    38      Bacteria, UA     Occasional     BANDS    1.0      Basophil%    0.0      Bilirubin (UA)     Negative     Total Bilirubin    0.5  Comment:  For infants and newborns, interpretation of results should be based  on gestational age, weight and in agreement with clinical  observations.  Premature Infant recommended reference ranges:  Up to 24 hours.............<8.0 mg/dL  Up to 48 hours............<12.0 mg/dL  3-5 days..................<15.0 mg/dL  6-29 days.................<15.0 mg/dL        Blood Culture, Routine  No Growth to date[P] No Growth to date[P]       BNP    312  Comment:  Values of less than 100 pg/ml are consistent with non-CHF populations.      BUN, Bld    17      Calcium    9.7      Chloride    100      CO2    28      Color, UA     Yellow     CPK    19      Creatinine    0.8      Differential Method    Manual  Comment:  Corrected result; previously reported as Automated on 09/25/2018 at   21:39.  [C]      eGFR if     >60      eGFR if non     >60  Comment:  Calculation used to  obtain the estimated glomerular filtration  rate (eGFR) is the CKD-EPI equation.         Eosinophil%    3.0      Glucose    137      Glucose, UA     Negative     Gran%    62.0      Hematocrit    33.5      Hemoglobin    10.9      Hyaline Casts, UA     1     Ketones, UA     Negative     Lactate, John    1.3  Comment:  Falsely low lactic acid results can be found in samples   containing >=13.0 mg/dL total bilirubin and/or >=3.5 mg/dL   direct bilirubin.        Leukocytes, UA     Negative     Lipase    137      Lymph%    18.0      Magnesium    1.5      MCH    29.9      MCHC    32.5      MCV    92      Microscopic Comment     SEE COMMENT  Comment:  Other formed elements not mentioned in the report are not   present in the microscopic examination.        Mono%    16.0      MPV    10.7      Nitrite, UA     Negative     Occult Blood UA     2+     pH, UA     7.0     Platelet Estimate    Appears normal      Platelets    277      Potassium    3.9      Total Protein    6.5      Protein, UA     1+  Comment:  Recommend a 24 hour urine protein or a urine   protein/creatinine ratio if globulin induced proteinuria is  clinically suspected.       RBC    3.64      RBC, UA     30     RDW    14.0      Sodium    138      Specific Gravity, UA     1.020     Specimen UA     Urine, Catheterized     Troponin I    0.010  Comment:  The reference interval for Troponin I represents the 99th percentile   cutoff   for our facility and is consistent with 3rd generation assay   performance.        Urine Culture, Routine No growth         Urobilinogen, UA     1.0     Valproic Acid Lvl    12.7  Comment:  Valproic Acid (ug/ml)  Toxic:   >100.0 ug/ml        WBC, UA     6     WBC    13.02                      Significant Imaging:  All pertinent imaging results/findings from the past 24 hours have been reviewed.

## 2018-09-26 NOTE — NURSING
Pt arrived to unit from ED. Pena catheter in place. AAOx2. Oriented to self and place only. VS taken. BP noted to be elevated. Plan of care reviewed with patient. Call light within reach, fall precautions initiated, bed alarm set. Patient aware. Nurse instructed patient to call if needs assistance. Patient verbalized complete understanding. Pt complains of RLQ abdominal pain 8/10; prn pain meds given for pain control.  Will continue to monitor and continue plan of care.

## 2018-09-26 NOTE — ASSESSMENT & PLAN NOTE
Right pyelonephritis  Right nephrolithiasis  Pt has been readmitted one day after discharge for pyelonephritis. Had previously been on merrem and discharged on cipro. WBC increased to 13.02. Non obstructing stone present.  -Give zosyn  -Urology consulted  -Monitor labs  -Place waller to improve urine outflow

## 2018-09-26 NOTE — H&P (VIEW-ONLY)
[facility]  Urology  Consult Note    Patient Name: Austyn Pedersen  MRN: [id number]  Admission Date: 9/25/2018  Hospital Length of Stay: 0   Code Status: Full Code   Attending Provider: Elliott Garcia MD   Consulting Provider: Laura Sorenson MD  Primary Care Physician: Primary Doctor No  Principal Problem:Renal abscess, right    Inpatient consult to Urology  Consult performed by: Laura Sorenson MD  Consult ordered by: Elliott Garcia MD  Reason for consult: pyelonephritis  Assessment/Recommendations: 78 y.o. female with multiple comorbidities including HTN, bipolar disorder, dementia residing at a nursing home facility [place] with right sided pyelonephritis without an obstructing stone on both CT scans from  9/20/18 and the one from yesterday 9/25/18.     CT from 9/25/18 demonstrated findings consistent with a persistent right sided pyelonephritis with the development of a 1cm right lower pole possible abscess development. Right ureter again demonstrates a thickened appearance without any obstructing calculus    Plan:  1. Agree with IV zosyn.  2. Right renal abscess small 1cm, typically IR percutaneous drainage is recommended for larger abscesses.  3. Urine and blood cultures demonstrate no growth of bacteria. May benefit from IV antibiotic therapy continuation as patient failed outpatient oral cipro which is the typical recommended regimen for pyelonephritis.  4. Recommend waller catheter placement to aid in drainage of urine from bladder.   5. Will continue to follow the patient, if she develops a fever or her WBC count is persistently elevated despite IV antibiotics, will plan for a cystoscopy, right ureteral stent to see if that may aid in expeditious recovery from pyelonephritis.           Subjective:     HPI:  78 y.o. female with multiple comorbidities (see past medical history below) who was seen earlier this month for a right sided pyelonephritis without an obstructing stone. She was  "readmitted from [facility] with reported 101 fevers which have resolved since being admitted by the hospitalist service with IV antibiotic administration. She was discharged with oral cipro and a fluoroquinolones are typically the optimal antibiotic therapy for pyelonephritis. Her blood cultures drawn yesterday as well as urine culture demonstrated no growth of bacteria. During her prior admission blood cultures were also negative and a urine culture demonstrated a pansensitive E. Coli.     The patient is not very conversive today when I spoke to her. She stated "I just want to rest" and asked me to not examine her and leave her alone.     Past Medical History:   Diagnosis Date    Anemia     Bipolar disorder     Chronic pain     Dementia     Depression     Extrapyramidal and movement disorder     Glaucoma     Hyperlipidemia     Hypertension     Scabies 05/05/2017     History reviewed. No pertinent surgical history.  History reviewed. No pertinent family history.  Social History     Tobacco Use    Smoking status: Unknown If Ever Smoked   Substance Use Topics    Alcohol use: Not on file    Drug use: Not on file     No current facility-administered medications on file prior to encounter.      Current Outpatient Medications on File Prior to Encounter   Medication Sig Dispense Refill    aspirin (ECOTRIN) 81 MG EC tablet Take 81 mg by mouth once daily.      calcium carbonate/vitamin D3 (CALCIUM 600 + D,3, ORAL) Take by mouth once daily.      carteolol (OCUPRESS) 1 % ophthalmic solution Place 1 drop into the right eye 2 (two) times daily.      cetirizine (ZYRTEC) 10 MG tablet Take 10 mg by mouth every evening.      ciprofloxacin HCl (CIPRO) 500 MG tablet Take 1 tablet (500 mg total) by mouth every 12 (twelve) hours. End date 10/4/18. for 10 days 26 tablet 0    cranberry fruit concentrate (AZO CRANBERRY ORAL) Take by mouth 3 (three) times daily.      cyanocobalamin (VITAMIN B-12) 100 MCG tablet " Take 100 mcg by mouth once daily.      divalproex (DEPAKOTE) 250 MG EC tablet Take 250 mg by mouth once daily.      divalproex (DEPAKOTE) 500 MG TbEC Take 500 mg by mouth every evening.      docusate sodium (COLACE) 100 MG capsule Take 100 mg by mouth daily as needed for Constipation.      estradiol (ESTRACE) 0.01 % (0.1 mg/gram) vaginal cream Place 1 g vaginally twice a week. Monday and Thursday      fluticasone (FLONASE) 50 mcg/actuation nasal spray 1 spray by Each Nare route once daily.      HYDROcodone-acetaminophen (NORCO) 5-325 mg per tablet Take 1 tablet by mouth 3 (three) times daily as needed for Pain.      latanoprost 0.005 % ophthalmic solution Place 1 drop into the right eye every evening.      lisinopril (PRINIVIL,ZESTRIL) 20 MG tablet Take 20 mg by mouth once daily.      melatonin 3 mg Tab Take by mouth every evening.      memantine (NAMENDA) 5 MG Tab Take 5 mg by mouth once daily.      methylcellulose oral powder Take by mouth once daily. 30cc In orange juice daily      mirabegron (MYRBETRIQ) 50 mg Tb24 Take by mouth once daily.      multivitamin (ONE DAILY MULTIVITAMIN) per tablet Take 1 tablet by mouth once daily.      propranolol (INNOPRAN XL) 80 mg 24 hr capsule Take 80 mg by mouth once daily.      risperiDONE (RISPERDAL) 1 MG tablet Take 1 mg by mouth 2 (two) times daily.      tiZANidine (ZANAFLEX) 2 MG tablet Take 4 mg by mouth 3 (three) times daily.      traZODone (DESYREL) 50 MG tablet Take 25 mg by mouth every evening.       Review of patient's allergies indicates:  No Known Allergies      Past Medical History:   Diagnosis Date    Anemia     Bipolar disorder     Chronic pain     Dementia     Depression     Extrapyramidal and movement disorder     Glaucoma     Hyperlipidemia     Hypertension     Scabies 05/05/2017       History reviewed. No pertinent surgical history.    Review of patient's allergies indicates:  No Known Allergies    Family History     None           Tobacco Use    Smoking status: Unknown If Ever Smoked   Substance and Sexual Activity    Alcohol use: Not on file    Drug use: Not on file    Sexual activity: Not on file       Review of Systems   Unable to perform ROS: Other       Objective:     Temp:  [99.1 °F (37.3 °C)-101 °F (38.3 °C)] 99.1 °F (37.3 °C)  Pulse:  [69-86] 80  Resp:  [16-29] 29  SpO2:  [91 %-100 %] 97 %  BP: (166-190)/(77-92) 168/80     Body mass index is 28.15 kg/m².            Drains          None          Physical Exam   Constitutional: She is oriented to person, place, and time. She appears well-developed and well-nourished.   HENT:   Head: Normocephalic and atraumatic.   Eyes: EOM are normal. Pupils are equal, round, and reactive to light.   Neck: Normal range of motion. Neck supple.   Pulmonary/Chest: Effort normal. No respiratory distress.   Abdominal:   Patient refused exam   Genitourinary:   Genitourinary Comments: No urethral waller   Musculoskeletal:   Patient refused examination   Neurological: She is alert and oriented to person, place, and time.   Skin: Skin is warm and dry.     Psychiatric: She has a normal mood and affect. Her behavior is normal. Thought content normal.       Significant Labs:    BMP:  Recent Labs   Lab  09/23/18 0455 09/24/18 0447 09/25/18 2050   NA  138  138  138   K  4.1  3.9  3.9   CL  104  102  100   CO2  25  25  28   BUN  23  18  17   CREATININE  0.8  0.8  0.8   CALCIUM  9.7  9.7  9.7       CBC:  Recent Labs   Lab  09/23/18 0455 09/24/18 0447 09/25/18 2050   WBC  11.96  10.62  13.02*   HGB  11.1*  11.0*  10.9*   HCT  35.1*  34.2*  33.5*   PLT  204  209  277       All pertinent labs results from the past 24 hours have been reviewed.  Recent Lab Results       09/26/18  0132 09/25/18 2126 09/25/18 2051 09/25/18 2050 09/25/18 2033      Procalcitonin    0.60  Comment:  Please re-baseline procalcitonin if a patient is transferred from  other facilities to [facility].  A  concentration < 0.25 ng/mL represents a low risk bacterial   infection.  Procalcitonin may not be accurate among patients with localized   infection, recent trauma or major surgery, immunosuppressed state,   invasive fungal infection, renal dysfunction. Decisions regarding   initiation or continuation of antibiotic therapy should not be based   solely on procalcitonin levels.        Albumin    2.3      Alkaline Phosphatase    141      ALT    47      Amylase    61      Anion Gap    10      Appearance, UA     Clear     AST    38      Bacteria, UA     Occasional     BANDS    1.0      Basophil%    0.0      Bilirubin (UA)     Negative     Total Bilirubin    0.5  Comment:  For infants and newborns, interpretation of results should be based  on gestational age, weight and in agreement with clinical  observations.  Premature Infant recommended reference ranges:  Up to 24 hours.............<8.0 mg/dL  Up to 48 hours............<12.0 mg/dL  3-5 days..................<15.0 mg/dL  6-29 days.................<15.0 mg/dL        Blood Culture, Routine  No Growth to date[P] No Growth to date[P]       BNP    312  Comment:  Values of less than 100 pg/ml are consistent with non-CHF populations.      BUN, Bld    17      Calcium    9.7      Chloride    100      CO2    28      Color, UA     Yellow     CPK    19      Creatinine    0.8      Differential Method    Manual  Comment:  Corrected result; previously reported as Automated on 09/25/2018 at   21:39.  [C]      eGFR if     >60      eGFR if non     >60  Comment:  Calculation used to obtain the estimated glomerular filtration  rate (eGFR) is the CKD-EPI equation.         Eosinophil%    3.0      Glucose    137      Glucose, UA     Negative     Gran%    62.0      Hematocrit    33.5      Hemoglobin    10.9      Hyaline Casts, UA     1     Ketones, UA     Negative     Lactate, John    1.3  Comment:  Falsely low lactic acid results can be found in samples    containing >=13.0 mg/dL total bilirubin and/or >=3.5 mg/dL   direct bilirubin.        Leukocytes, UA     Negative     Lipase    137      Lymph%    18.0      Magnesium    1.5      MCH    29.9      MCHC    32.5      MCV    92      Microscopic Comment     SEE COMMENT  Comment:  Other formed elements not mentioned in the report are not   present in the microscopic examination.        Mono%    16.0      MPV    10.7      Nitrite, UA     Negative     Occult Blood UA     2+     pH, UA     7.0     Platelet Estimate    Appears normal      Platelets    277      Potassium    3.9      Total Protein    6.5      Protein, UA     1+  Comment:  Recommend a 24 hour urine protein or a urine   protein/creatinine ratio if globulin induced proteinuria is  clinically suspected.       RBC    3.64      RBC, UA     30     RDW    14.0      Sodium    138      Specific Gravity, UA     1.020     Specimen UA     Urine, Catheterized     Troponin I    0.010  Comment:  The reference interval for Troponin I represents the 99th percentile   cutoff   for our facility and is consistent with 3rd generation assay   performance.        Urine Culture, Routine No growth         Urobilinogen, UA     1.0     Valproic Acid Lvl    12.7  Comment:  Valproic Acid (ug/ml)  Toxic:   >100.0 ug/ml        WBC, UA     6     WBC    13.02                      Significant Imaging:  All pertinent imaging results/findings from the past 24 hours have been reviewed.                    Assessment and Plan:     * Renal abscess, right    78 y.o. female with multiple comorbidities including HTN, bipolar disorder, dementia residing at a nursing home facility [place] with right sided pyelonephritis without an obstructing stone on both CT scans from  9/20/18 and the one from yesterday 9/25/18.     CT from 9/25/18 demonstrated findings consistent with a persistent right sided pyelonephritis with the development of a 1cm right lower pole possible abscess development. Right ureter again  demonstrates a thickened appearance without any obstructing calculus    Plan:  1. Agree with IV zosyn.  2. Right renal abscess small 1cm, typically IR percutaneous drainage is recommended for larger abscesses.  3. Urine and blood cultures demonstrate no growth of bacteria. May benefit from IV antibiotic therapy continuation as patient failed outpatient oral cipro which is the typical recommended regimen for pyelonephritis.  4. Recommend waller catheter placement to aid in drainage of urine from bladder.   5. Will continue to follow the patient, if she develops a fever or her WBC count is persistently elevated despite IV antibiotics, will plan for a cystoscopy, right ureteral stent to see if that may aid in expeditious recovery from pyelonephritis.             VTE Risk Mitigation (From admission, onward)        Ordered     enoxaparin injection 40 mg  Daily      09/26/18 0358     IP VTE LOW RISK PATIENT  Once      09/26/18 0132          Thank you for your consult.      Laura Sorenson MD  Urology  [facility]

## 2018-09-26 NOTE — H&P
[facility] Hospital Medicine  History & Physical    Patient Name: Austyn Pedersen  MRN: [id number]  Admission Date: 9/25/2018  Attending Physician: Elliott Garcia MD   Primary Care Provider: Primary Doctor No         Patient information was obtained from patient, EMS personnel, nursing home, past medical records and ER records.     Subjective:     Principal Problem:Renal abscess, right    Chief Complaint:   Chief Complaint   Patient presents with    Fever     accompanied by chest pain (relieved at this time) and abdominal pain; pts abdomen is distended; was dishcharged yesterday after being treated for sepsis; was given 1 nitro sL and 1 324 mg of ASA prior to arrival be nursing home; pt resides at [facility]; alert and oriented        HPI: Ms. Austyn Pedersen is a 79 yo woman with dementia, bipolar disorder, HTN who presents with pyelonephritis.  She was taken to [facility] Emergency [facility] on 9/14/18 for syncope and urine incontinence.  She was hypotensive with blood pressure of 86/48.  Urinalysis was normal.  WBC count was 6,030.  She was given IV fluids.              She returned to [facility] on 9/20/18.  Blood pressure was normal to high this time.  Pulse was 113.  Temperature was 102.3° Fahrenheit.  Urinalysis showed 3+ protein, positive nitrite, >100 RBC/hpf, 50 WBC/hpf, few WBC clumps, and many bacteria.  WBC count was 15,530.  CT showed right nonobstructing kidney stone with pyelonephritis.  She was given ceftriaxone and 2,355 mL of normal saline.  She was admitted to [facility] and put on meropenem. She had encephalopathy with difficulty swallowing.  Her blood and urine cultures grew pansensitive E. Coli.  Sepsis resolved.   Meropenem was changed to ciprofloxacin to complete a 14 day antibiotic course.  She was discharged back to [facility] on 9/24/18.                She again returned to [facility] on 9/25  evening with fever, chest and abdominal pain. She was given nitro and aspirin PTA. BNP was 312, procal was 0.60. WBC was had increased from 10.62 9/24 to 13.02. UA showed 30 RBC and 6 WBC. Chest XR showed small pleural effusions. CT abdomen showed pyelo similar to CT on 9/20 and interval development of right renal abscess. Tmax was 101. SpO2 was 91% and pt was placed on O2 with improvement. She was given zosyn and admitted to hospital medicine.    Past Medical History:   Diagnosis Date    Anemia     Bipolar disorder     Chronic pain     Dementia     Depression     Extrapyramidal and movement disorder     Glaucoma     Hyperlipidemia     Hypertension     Scabies 05/05/2017       History reviewed. No pertinent surgical history.    Review of patient's allergies indicates:  No Known Allergies    No current facility-administered medications on file prior to encounter.      Current Outpatient Medications on File Prior to Encounter   Medication Sig    aspirin (ECOTRIN) 81 MG EC tablet Take 81 mg by mouth once daily.    calcium carbonate/vitamin D3 (CALCIUM 600 + D,3, ORAL) Take by mouth once daily.    carteolol (OCUPRESS) 1 % ophthalmic solution Place 1 drop into the right eye 2 (two) times daily.    cetirizine (ZYRTEC) 10 MG tablet Take 10 mg by mouth every evening.    ciprofloxacin HCl (CIPRO) 500 MG tablet Take 1 tablet (500 mg total) by mouth every 12 (twelve) hours. End date 10/4/18. for 10 days    cranberry fruit concentrate (AZO CRANBERRY ORAL) Take by mouth 3 (three) times daily.    cyanocobalamin (VITAMIN B-12) 100 MCG tablet Take 100 mcg by mouth once daily.    divalproex (DEPAKOTE) 250 MG EC tablet Take 250 mg by mouth once daily.    divalproex (DEPAKOTE) 500 MG TbEC Take 500 mg by mouth every evening.    docusate sodium (COLACE) 100 MG capsule Take 100 mg by mouth daily as needed for Constipation.    estradiol (ESTRACE) 0.01 % (0.1 mg/gram) vaginal cream Place 1 g vaginally twice a week.  Monday and Thursday    fluticasone (FLONASE) 50 mcg/actuation nasal spray 1 spray by Each Nare route once daily.    HYDROcodone-acetaminophen (NORCO) 5-325 mg per tablet Take 1 tablet by mouth 3 (three) times daily as needed for Pain.    latanoprost 0.005 % ophthalmic solution Place 1 drop into the right eye every evening.    lisinopril (PRINIVIL,ZESTRIL) 20 MG tablet Take 20 mg by mouth once daily.    melatonin 3 mg Tab Take by mouth every evening.    memantine (NAMENDA) 5 MG Tab Take 5 mg by mouth once daily.    methylcellulose oral powder Take by mouth once daily. 30cc In orange juice daily    mirabegron (MYRBETRIQ) 50 mg Tb24 Take by mouth once daily.    multivitamin (ONE DAILY MULTIVITAMIN) per tablet Take 1 tablet by mouth once daily.    propranolol (INNOPRAN XL) 80 mg 24 hr capsule Take 80 mg by mouth once daily.    risperiDONE (RISPERDAL) 1 MG tablet Take 1 mg by mouth 2 (two) times daily.    tiZANidine (ZANAFLEX) 2 MG tablet Take 4 mg by mouth 3 (three) times daily.    traZODone (DESYREL) 50 MG tablet Take 25 mg by mouth every evening.     Family History     Pt is unable to answer        Tobacco Use    Smoking status: Unknown If Ever Smoked   Substance and Sexual Activity    Alcohol use: Not on file    Drug use: Not on file    Sexual activity: Not on file     Review of Systems   Unable to perform ROS: Dementia   Gastrointestinal: Positive for abdominal pain.     Objective:     Vital Signs (Most Recent):  Temp: 99.1 °F (37.3 °C) (09/26/18 0925)  Pulse: 80 (09/26/18 0725)  Resp: (!) 29 (09/26/18 0725)  BP: (!) 168/80 (09/26/18 0725)  SpO2: 97 % (09/26/18 0725) Vital Signs (24h Range):  Temp:  [99.1 °F (37.3 °C)-101 °F (38.3 °C)] 99.1 °F (37.3 °C)  Pulse:  [69-86] 80  Resp:  [16-29] 29  SpO2:  [91 %-100 %] 97 %  BP: (166-190)/(77-92) 168/80     Weight: 89 kg (196 lb 3.4 oz)  Body mass index is 28.15 kg/m².    Physical Exam   Constitutional: She appears well-developed and well-nourished.    Cardiovascular: Normal rate and regular rhythm.   Murmur heard.  Pulmonary/Chest: Effort normal.   Abdominal: She exhibits distension. There is tenderness (right). There is CVA tenderness.   Musculoskeletal: She exhibits no edema.   Neurological: She is alert.   Skin: Skin is warm and dry. Capillary refill takes less than 2 seconds. She is not diaphoretic.           Significant Labs: All pertinent labs within the past 24 hours have been reviewed.    Significant Imaging: I have reviewed all pertinent imaging results/findings within the past 24 hours.    Assessment/Plan:     * Renal abscess, right    Right pyelonephritis  Right nephrolithiasis  Pt has been readmitted one day after discharge for pyelonephritis. Had previously been on merrem and discharged on cipro. WBC increased to 13.02. Non obstructing stone present.  -Give zosyn  -Urology consulted  -Monitor labs  -Place waller to improve urine outflow        Essential hypertension    -190.   -Continue home lisinopril and propanolol  -Monitor        Nursing home resident    Wheelchair dependent  Return to [facility] on d/c.        Bipolar affective disorder    Dementia without behavioral disturbance  Pt not fully oriented and somewhat upset on admission.  -Continue home depakote and risperidone  -Continue home namenda  -Delirium precautions          VTE Risk Mitigation (From admission, onward)        Ordered     enoxaparin injection 40 mg  Daily      09/26/18 0358     IP VTE LOW RISK PATIENT  Once      09/26/18 0132             Piper Barraza PA-C  Department of Hospital Medicine   [facility]

## 2018-09-26 NOTE — PLAN OF CARE
Problem: Patient Care Overview  Goal: Plan of Care Review  Outcome: Ongoing (interventions implemented as appropriate)  Pt AAO to self and place. She slept most of shift, but wakes with verbal stimuli.   Pt T-max 101 orally this shift, relieved by PRN Tylenol.   IV Abx administered per order.   Pt remained free of falls this shift.   Pt had no c/o pain this shift.  Plan of care reviewed, no evidence of learning. Will require reinforcement teaching.   Pt moving/turing with assistance. Frequent weight shifting encouraged.  Patient SR on monitor.   Bed low, side rails up x 2, wheels locked, call light in reach.   Patient instructed to call for assistance.   Hourly rounding completed.   Will continue to monitor.

## 2018-09-26 NOTE — CONSULTS
[facility]  Urology  Consult Note    Patient Name: Austyn Pedersen  MRN: [id number]  Admission Date: 9/25/2018  Hospital Length of Stay: 0   Code Status: Full Code   Attending Provider: Elliott Garcia MD   Consulting Provider: Laura Sorenson MD  Primary Care Physician: Primary Doctor No  Principal Problem:Renal abscess, right    Inpatient consult to Urology  Consult performed by: Laura Sorenson MD  Consult ordered by: Elliott Garcia MD  Reason for consult: pyelonephritis  Assessment/Recommendations: 78 y.o. female with multiple comorbidities including HTN, bipolar disorder, dementia residing at a nursing home facility [place] with right sided pyelonephritis without an obstructing stone on both CT scans from  9/20/18 and the one from yesterday 9/25/18.     CT from 9/25/18 demonstrated findings consistent with a persistent right sided pyelonephritis with the development of a 1cm right lower pole possible abscess development. Right ureter again demonstrates a thickened appearance without any obstructing calculus    Plan:  1. Agree with IV zosyn.  2. Right renal abscess small 1cm, typically IR percutaneous drainage is recommended for larger abscesses.  3. Urine and blood cultures demonstrate no growth of bacteria. May benefit from IV antibiotic therapy continuation as patient failed outpatient oral cipro which is the typical recommended regimen for pyelonephritis.  4. Recommend waller catheter placement to aid in drainage of urine from bladder.   5. Will continue to follow the patient, if she develops a fever or her WBC count is persistently elevated despite IV antibiotics, will plan for a cystoscopy, right ureteral stent to see if that may aid in expeditious recovery from pyelonephritis.           Subjective:     HPI:  78 y.o. female with multiple comorbidities (see past medical history below) who was seen earlier this month for a right sided pyelonephritis without an obstructing stone. She was  "readmitted from [facility] with reported 101 fevers which have resolved since being admitted by the hospitalist service with IV antibiotic administration. She was discharged with oral cipro and a fluoroquinolones are typically the optimal antibiotic therapy for pyelonephritis. Her blood cultures drawn yesterday as well as urine culture demonstrated no growth of bacteria. During her prior admission blood cultures were also negative and a urine culture demonstrated a pansensitive E. Coli.     The patient is not very conversive today when I spoke to her. She stated "I just want to rest" and asked me to not examine her and leave her alone.     Past Medical History:   Diagnosis Date    Anemia     Bipolar disorder     Chronic pain     Dementia     Depression     Extrapyramidal and movement disorder     Glaucoma     Hyperlipidemia     Hypertension     Scabies 05/05/2017     History reviewed. No pertinent surgical history.  History reviewed. No pertinent family history.  Social History     Tobacco Use    Smoking status: Unknown If Ever Smoked   Substance Use Topics    Alcohol use: Not on file    Drug use: Not on file     No current facility-administered medications on file prior to encounter.      Current Outpatient Medications on File Prior to Encounter   Medication Sig Dispense Refill    aspirin (ECOTRIN) 81 MG EC tablet Take 81 mg by mouth once daily.      calcium carbonate/vitamin D3 (CALCIUM 600 + D,3, ORAL) Take by mouth once daily.      carteolol (OCUPRESS) 1 % ophthalmic solution Place 1 drop into the right eye 2 (two) times daily.      cetirizine (ZYRTEC) 10 MG tablet Take 10 mg by mouth every evening.      ciprofloxacin HCl (CIPRO) 500 MG tablet Take 1 tablet (500 mg total) by mouth every 12 (twelve) hours. End date 10/4/18. for 10 days 26 tablet 0    cranberry fruit concentrate (AZO CRANBERRY ORAL) Take by mouth 3 (three) times daily.      cyanocobalamin (VITAMIN B-12) 100 MCG tablet " Take 100 mcg by mouth once daily.      divalproex (DEPAKOTE) 250 MG EC tablet Take 250 mg by mouth once daily.      divalproex (DEPAKOTE) 500 MG TbEC Take 500 mg by mouth every evening.      docusate sodium (COLACE) 100 MG capsule Take 100 mg by mouth daily as needed for Constipation.      estradiol (ESTRACE) 0.01 % (0.1 mg/gram) vaginal cream Place 1 g vaginally twice a week. Monday and Thursday      fluticasone (FLONASE) 50 mcg/actuation nasal spray 1 spray by Each Nare route once daily.      HYDROcodone-acetaminophen (NORCO) 5-325 mg per tablet Take 1 tablet by mouth 3 (three) times daily as needed for Pain.      latanoprost 0.005 % ophthalmic solution Place 1 drop into the right eye every evening.      lisinopril (PRINIVIL,ZESTRIL) 20 MG tablet Take 20 mg by mouth once daily.      melatonin 3 mg Tab Take by mouth every evening.      memantine (NAMENDA) 5 MG Tab Take 5 mg by mouth once daily.      methylcellulose oral powder Take by mouth once daily. 30cc In orange juice daily      mirabegron (MYRBETRIQ) 50 mg Tb24 Take by mouth once daily.      multivitamin (ONE DAILY MULTIVITAMIN) per tablet Take 1 tablet by mouth once daily.      propranolol (INNOPRAN XL) 80 mg 24 hr capsule Take 80 mg by mouth once daily.      risperiDONE (RISPERDAL) 1 MG tablet Take 1 mg by mouth 2 (two) times daily.      tiZANidine (ZANAFLEX) 2 MG tablet Take 4 mg by mouth 3 (three) times daily.      traZODone (DESYREL) 50 MG tablet Take 25 mg by mouth every evening.       Review of patient's allergies indicates:  No Known Allergies      Past Medical History:   Diagnosis Date    Anemia     Bipolar disorder     Chronic pain     Dementia     Depression     Extrapyramidal and movement disorder     Glaucoma     Hyperlipidemia     Hypertension     Scabies 05/05/2017       History reviewed. No pertinent surgical history.    Review of patient's allergies indicates:  No Known Allergies    Family History     None           Tobacco Use    Smoking status: Unknown If Ever Smoked   Substance and Sexual Activity    Alcohol use: Not on file    Drug use: Not on file    Sexual activity: Not on file       Review of Systems   Unable to perform ROS: Other       Objective:     Temp:  [99.1 °F (37.3 °C)-101 °F (38.3 °C)] 99.1 °F (37.3 °C)  Pulse:  [69-86] 80  Resp:  [16-29] 29  SpO2:  [91 %-100 %] 97 %  BP: (166-190)/(77-92) 168/80     Body mass index is 28.15 kg/m².            Drains          None          Physical Exam   Constitutional: She is oriented to person, place, and time. She appears well-developed and well-nourished.   HENT:   Head: Normocephalic and atraumatic.   Eyes: EOM are normal. Pupils are equal, round, and reactive to light.   Neck: Normal range of motion. Neck supple.   Pulmonary/Chest: Effort normal. No respiratory distress.   Abdominal:   Patient refused exam   Genitourinary:   Genitourinary Comments: No urethral waller   Musculoskeletal:   Patient refused examination   Neurological: She is alert and oriented to person, place, and time.   Skin: Skin is warm and dry.     Psychiatric: She has a normal mood and affect. Her behavior is normal. Thought content normal.       Significant Labs:    BMP:  Recent Labs   Lab  09/23/18 0455 09/24/18 0447 09/25/18 2050   NA  138  138  138   K  4.1  3.9  3.9   CL  104  102  100   CO2  25  25  28   BUN  23  18  17   CREATININE  0.8  0.8  0.8   CALCIUM  9.7  9.7  9.7       CBC:  Recent Labs   Lab  09/23/18 0455 09/24/18 0447 09/25/18 2050   WBC  11.96  10.62  13.02*   HGB  11.1*  11.0*  10.9*   HCT  35.1*  34.2*  33.5*   PLT  204  209  277       All pertinent labs results from the past 24 hours have been reviewed.  Recent Lab Results       09/26/18  0132 09/25/18 2126 09/25/18 2051 09/25/18 2050 09/25/18 2033      Procalcitonin    0.60  Comment:  Please re-baseline procalcitonin if a patient is transferred from  other facilities to [facility].  A  concentration < 0.25 ng/mL represents a low risk bacterial   infection.  Procalcitonin may not be accurate among patients with localized   infection, recent trauma or major surgery, immunosuppressed state,   invasive fungal infection, renal dysfunction. Decisions regarding   initiation or continuation of antibiotic therapy should not be based   solely on procalcitonin levels.        Albumin    2.3      Alkaline Phosphatase    141      ALT    47      Amylase    61      Anion Gap    10      Appearance, UA     Clear     AST    38      Bacteria, UA     Occasional     BANDS    1.0      Basophil%    0.0      Bilirubin (UA)     Negative     Total Bilirubin    0.5  Comment:  For infants and newborns, interpretation of results should be based  on gestational age, weight and in agreement with clinical  observations.  Premature Infant recommended reference ranges:  Up to 24 hours.............<8.0 mg/dL  Up to 48 hours............<12.0 mg/dL  3-5 days..................<15.0 mg/dL  6-29 days.................<15.0 mg/dL        Blood Culture, Routine  No Growth to date[P] No Growth to date[P]       BNP    312  Comment:  Values of less than 100 pg/ml are consistent with non-CHF populations.      BUN, Bld    17      Calcium    9.7      Chloride    100      CO2    28      Color, UA     Yellow     CPK    19      Creatinine    0.8      Differential Method    Manual  Comment:  Corrected result; previously reported as Automated on 09/25/2018 at   21:39.  [C]      eGFR if     >60      eGFR if non     >60  Comment:  Calculation used to obtain the estimated glomerular filtration  rate (eGFR) is the CKD-EPI equation.         Eosinophil%    3.0      Glucose    137      Glucose, UA     Negative     Gran%    62.0      Hematocrit    33.5      Hemoglobin    10.9      Hyaline Casts, UA     1     Ketones, UA     Negative     Lactate, John    1.3  Comment:  Falsely low lactic acid results can be found in samples    containing >=13.0 mg/dL total bilirubin and/or >=3.5 mg/dL   direct bilirubin.        Leukocytes, UA     Negative     Lipase    137      Lymph%    18.0      Magnesium    1.5      MCH    29.9      MCHC    32.5      MCV    92      Microscopic Comment     SEE COMMENT  Comment:  Other formed elements not mentioned in the report are not   present in the microscopic examination.        Mono%    16.0      MPV    10.7      Nitrite, UA     Negative     Occult Blood UA     2+     pH, UA     7.0     Platelet Estimate    Appears normal      Platelets    277      Potassium    3.9      Total Protein    6.5      Protein, UA     1+  Comment:  Recommend a 24 hour urine protein or a urine   protein/creatinine ratio if globulin induced proteinuria is  clinically suspected.       RBC    3.64      RBC, UA     30     RDW    14.0      Sodium    138      Specific Gravity, UA     1.020     Specimen UA     Urine, Catheterized     Troponin I    0.010  Comment:  The reference interval for Troponin I represents the 99th percentile   cutoff   for our facility and is consistent with 3rd generation assay   performance.        Urine Culture, Routine No growth         Urobilinogen, UA     1.0     Valproic Acid Lvl    12.7  Comment:  Valproic Acid (ug/ml)  Toxic:   >100.0 ug/ml        WBC, UA     6     WBC    13.02                      Significant Imaging:  All pertinent imaging results/findings from the past 24 hours have been reviewed.                    Assessment and Plan:     * Renal abscess, right    78 y.o. female with multiple comorbidities including HTN, bipolar disorder, dementia residing at a nursing home facility [place] with right sided pyelonephritis without an obstructing stone on both CT scans from  9/20/18 and the one from yesterday 9/25/18.     CT from 9/25/18 demonstrated findings consistent with a persistent right sided pyelonephritis with the development of a 1cm right lower pole possible abscess development. Right ureter again  demonstrates a thickened appearance without any obstructing calculus    Plan:  1. Agree with IV zosyn.  2. Right renal abscess small 1cm, typically IR percutaneous drainage is recommended for larger abscesses.  3. Urine and blood cultures demonstrate no growth of bacteria. May benefit from IV antibiotic therapy continuation as patient failed outpatient oral cipro which is the typical recommended regimen for pyelonephritis.  4. Recommend waller catheter placement to aid in drainage of urine from bladder.   5. Will continue to follow the patient, if she develops a fever or her WBC count is persistently elevated despite IV antibiotics, will plan for a cystoscopy, right ureteral stent to see if that may aid in expeditious recovery from pyelonephritis.             VTE Risk Mitigation (From admission, onward)        Ordered     enoxaparin injection 40 mg  Daily      09/26/18 0358     IP VTE LOW RISK PATIENT  Once      09/26/18 0132          Thank you for your consult.      Laura Sorenson MD  Urology  [facility]

## 2018-09-26 NOTE — HPI
"78 y.o. female with multiple comorbidities (see past medical history below) who was seen earlier this month for a right sided pyelonephritis without an obstructing stone. She was readmitted from [facility] with reported 101 fevers which have resolved since being admitted by the hospitalist service with IV antibiotic administration. She was discharged with oral cipro and a fluoroquinolones are typically the optimal antibiotic therapy for pyelonephritis. Her blood cultures drawn yesterday as well as urine culture demonstrated no growth of bacteria. During her prior admission blood cultures were also negative and a urine culture demonstrated a pansensitive E. Coli.     The patient is not very conversive today when I spoke to her. She stated "I just want to rest" and asked me to not examine her and leave her alone.     Past Medical History:   Diagnosis Date    Anemia     Bipolar disorder     Chronic pain     Dementia     Depression     Extrapyramidal and movement disorder     Glaucoma     Hyperlipidemia     Hypertension     Scabies 05/05/2017     History reviewed. No pertinent surgical history.  History reviewed. No pertinent family history.  Social History     Tobacco Use    Smoking status: Unknown If Ever Smoked   Substance Use Topics    Alcohol use: Not on file    Drug use: Not on file     No current facility-administered medications on file prior to encounter.      Current Outpatient Medications on File Prior to Encounter   Medication Sig Dispense Refill    aspirin (ECOTRIN) 81 MG EC tablet Take 81 mg by mouth once daily.      calcium carbonate/vitamin D3 (CALCIUM 600 + D,3, ORAL) Take by mouth once daily.      carteolol (OCUPRESS) 1 % ophthalmic solution Place 1 drop into the right eye 2 (two) times daily.      cetirizine (ZYRTEC) 10 MG tablet Take 10 mg by mouth every evening.      ciprofloxacin HCl (CIPRO) 500 MG tablet Take 1 tablet (500 mg total) by mouth every 12 (twelve) hours. End " date 10/4/18. for 10 days 26 tablet 0    cranberry fruit concentrate (AZO CRANBERRY ORAL) Take by mouth 3 (three) times daily.      cyanocobalamin (VITAMIN B-12) 100 MCG tablet Take 100 mcg by mouth once daily.      divalproex (DEPAKOTE) 250 MG EC tablet Take 250 mg by mouth once daily.      divalproex (DEPAKOTE) 500 MG TbEC Take 500 mg by mouth every evening.      docusate sodium (COLACE) 100 MG capsule Take 100 mg by mouth daily as needed for Constipation.      estradiol (ESTRACE) 0.01 % (0.1 mg/gram) vaginal cream Place 1 g vaginally twice a week. Monday and Thursday      fluticasone (FLONASE) 50 mcg/actuation nasal spray 1 spray by Each Nare route once daily.      HYDROcodone-acetaminophen (NORCO) 5-325 mg per tablet Take 1 tablet by mouth 3 (three) times daily as needed for Pain.      latanoprost 0.005 % ophthalmic solution Place 1 drop into the right eye every evening.      lisinopril (PRINIVIL,ZESTRIL) 20 MG tablet Take 20 mg by mouth once daily.      melatonin 3 mg Tab Take by mouth every evening.      memantine (NAMENDA) 5 MG Tab Take 5 mg by mouth once daily.      methylcellulose oral powder Take by mouth once daily. 30cc In orange juice daily      mirabegron (MYRBETRIQ) 50 mg Tb24 Take by mouth once daily.      multivitamin (ONE DAILY MULTIVITAMIN) per tablet Take 1 tablet by mouth once daily.      propranolol (INNOPRAN XL) 80 mg 24 hr capsule Take 80 mg by mouth once daily.      risperiDONE (RISPERDAL) 1 MG tablet Take 1 mg by mouth 2 (two) times daily.      tiZANidine (ZANAFLEX) 2 MG tablet Take 4 mg by mouth 3 (three) times daily.      traZODone (DESYREL) 50 MG tablet Take 25 mg by mouth every evening.       Review of patient's allergies indicates:  No Known Allergies

## 2018-09-26 NOTE — HPI
Ms. Austyn Pedersen is a 79 yo woman with dementia, bipolar disorder, HTN who presents with pyelonephritis.  She was taken to [facility] on 9/14/18 for syncope and urine incontinence.  She was hypotensive with blood pressure of 86/48.  Urinalysis was normal.  WBC count was 6,030.  She was given IV fluids.              She returned to [facility] on 9/20/18.  Blood pressure was normal to high this time.  Pulse was 113.  Temperature was 102.3° Fahrenheit.  Urinalysis showed 3+ protein, positive nitrite, >100 RBC/hpf, 50 WBC/hpf, few WBC clumps, and many bacteria.  WBC count was 15,530.  CT showed right nonobstructing kidney stone with pyelonephritis.  She was given ceftriaxone and 2,355 mL of normal saline.  She was admitted to [facility] and put on meropenem. She had encephalopathy with difficulty swallowing.  Her blood and urine cultures grew pansensitive E. Coli.  Sepsis resolved.  Meropenem was changed to ciprofloxacin to complete a 14 day antibiotic course.  She was discharged back to [facility] on 9/24/18.                She again returned to [facility] on 9/25 evening with fever, chest and abdominal pain. She was given nitro and aspirin PTA. BNP was 312, procal was 0.60. WBC was had increased from 10.62 9/24 to 13.02. UA showed 30 RBC and 6 WBC. Chest XR showed small pleural effusions. CT abdomen showed pyelo similar to CT on 9/20 and interval development of right renal abscess. Tmax was 101. SpO2 was 91% and pt was placed on O2 with improvement. She was given zosyn and admitted to hospital medicine.

## 2018-09-27 ENCOUNTER — ANESTHESIA (OUTPATIENT)
Dept: SURGERY | Facility: HOSPITAL | Age: 78
DRG: 689 | End: 2018-09-27
Payer: MEDICARE

## 2018-09-27 ENCOUNTER — ANESTHESIA EVENT (OUTPATIENT)
Dept: SURGERY | Facility: HOSPITAL | Age: 78
DRG: 689 | End: 2018-09-27
Payer: MEDICARE

## 2018-09-27 VITALS — SYSTOLIC BLOOD PRESSURE: 124 MMHG | OXYGEN SATURATION: 100 % | DIASTOLIC BLOOD PRESSURE: 60 MMHG | HEART RATE: 74 BPM

## 2018-09-27 LAB
ANION GAP SERPL CALC-SCNC: 9 MMOL/L
BACTERIA BLD CULT: NORMAL
BASOPHILS # BLD AUTO: 0.05 K/UL
BASOPHILS NFR BLD: 0.5 %
BUN SERPL-MCNC: 14 MG/DL
CALCIUM SERPL-MCNC: 9.3 MG/DL
CHLORIDE SERPL-SCNC: 99 MMOL/L
CO2 SERPL-SCNC: 32 MMOL/L
CREAT SERPL-MCNC: 0.8 MG/DL
DIFFERENTIAL METHOD: ABNORMAL
EOSINOPHIL # BLD AUTO: 0.3 K/UL
EOSINOPHIL NFR BLD: 3 %
ERYTHROCYTE [DISTWIDTH] IN BLOOD BY AUTOMATED COUNT: 14.2 %
EST. GFR  (AFRICAN AMERICAN): >60 ML/MIN/1.73 M^2
EST. GFR  (NON AFRICAN AMERICAN): >60 ML/MIN/1.73 M^2
GLUCOSE SERPL-MCNC: 87 MG/DL
HCT VFR BLD AUTO: 32 %
HGB BLD-MCNC: 10.3 G/DL
LYMPHOCYTES # BLD AUTO: 2.3 K/UL
LYMPHOCYTES NFR BLD: 22.3 %
MCH RBC QN AUTO: 29.9 PG
MCHC RBC AUTO-ENTMCNC: 32.2 G/DL
MCV RBC AUTO: 93 FL
MONOCYTES # BLD AUTO: 1.4 K/UL
MONOCYTES NFR BLD: 13.5 %
NEUTROPHILS # BLD AUTO: 5.8 K/UL
NEUTROPHILS NFR BLD: 60.7 %
PLATELET # BLD AUTO: 298 K/UL
PMV BLD AUTO: 9.7 FL
POTASSIUM SERPL-SCNC: 3.9 MMOL/L
RBC # BLD AUTO: 3.44 M/UL
SODIUM SERPL-SCNC: 140 MMOL/L
WBC # BLD AUTO: 10.24 K/UL

## 2018-09-27 PROCEDURE — 74420 UROGRAPHY RTRGR +-KUB: CPT | Mod: 26,,, | Performed by: STUDENT IN AN ORGANIZED HEALTH CARE EDUCATION/TRAINING PROGRAM

## 2018-09-27 PROCEDURE — 27000221 HC OXYGEN, UP TO 24 HOURS

## 2018-09-27 PROCEDURE — 63600175 PHARM REV CODE 636 W HCPCS: Performed by: NURSE ANESTHETIST, CERTIFIED REGISTERED

## 2018-09-27 PROCEDURE — 37000009 HC ANESTHESIA EA ADD 15 MINS: Performed by: STUDENT IN AN ORGANIZED HEALTH CARE EDUCATION/TRAINING PROGRAM

## 2018-09-27 PROCEDURE — 63600175 PHARM REV CODE 636 W HCPCS: Performed by: HOSPITALIST

## 2018-09-27 PROCEDURE — 25000003 PHARM REV CODE 250: Performed by: HOSPITALIST

## 2018-09-27 PROCEDURE — C1758 CATHETER, URETERAL: HCPCS | Performed by: STUDENT IN AN ORGANIZED HEALTH CARE EDUCATION/TRAINING PROGRAM

## 2018-09-27 PROCEDURE — 85025 COMPLETE CBC W/AUTO DIFF WBC: CPT

## 2018-09-27 PROCEDURE — 99233 SBSQ HOSP IP/OBS HIGH 50: CPT | Mod: 25,,, | Performed by: STUDENT IN AN ORGANIZED HEALTH CARE EDUCATION/TRAINING PROGRAM

## 2018-09-27 PROCEDURE — 52332 CYSTOSCOPY AND TREATMENT: CPT | Mod: RT,,, | Performed by: STUDENT IN AN ORGANIZED HEALTH CARE EDUCATION/TRAINING PROGRAM

## 2018-09-27 PROCEDURE — C2617 STENT, NON-COR, TEM W/O DEL: HCPCS | Performed by: STUDENT IN AN ORGANIZED HEALTH CARE EDUCATION/TRAINING PROGRAM

## 2018-09-27 PROCEDURE — 63600175 PHARM REV CODE 636 W HCPCS: Performed by: PHYSICIAN ASSISTANT

## 2018-09-27 PROCEDURE — 36000706: Performed by: STUDENT IN AN ORGANIZED HEALTH CARE EDUCATION/TRAINING PROGRAM

## 2018-09-27 PROCEDURE — 71000033 HC RECOVERY, INTIAL HOUR: Performed by: STUDENT IN AN ORGANIZED HEALTH CARE EDUCATION/TRAINING PROGRAM

## 2018-09-27 PROCEDURE — 37000008 HC ANESTHESIA 1ST 15 MINUTES: Performed by: STUDENT IN AN ORGANIZED HEALTH CARE EDUCATION/TRAINING PROGRAM

## 2018-09-27 PROCEDURE — 36415 COLL VENOUS BLD VENIPUNCTURE: CPT

## 2018-09-27 PROCEDURE — 94761 N-INVAS EAR/PLS OXIMETRY MLT: CPT

## 2018-09-27 PROCEDURE — 76000 FLUOROSCOPY <1 HR PHYS/QHP: CPT | Mod: 26,59,, | Performed by: STUDENT IN AN ORGANIZED HEALTH CARE EDUCATION/TRAINING PROGRAM

## 2018-09-27 PROCEDURE — A4216 STERILE WATER/SALINE, 10 ML: HCPCS | Performed by: ANESTHESIOLOGY

## 2018-09-27 PROCEDURE — 80048 BASIC METABOLIC PNL TOTAL CA: CPT

## 2018-09-27 PROCEDURE — 25500020 PHARM REV CODE 255: Performed by: STUDENT IN AN ORGANIZED HEALTH CARE EDUCATION/TRAINING PROGRAM

## 2018-09-27 PROCEDURE — 25000003 PHARM REV CODE 250: Performed by: ANESTHESIOLOGY

## 2018-09-27 PROCEDURE — 36000707: Performed by: STUDENT IN AN ORGANIZED HEALTH CARE EDUCATION/TRAINING PROGRAM

## 2018-09-27 PROCEDURE — C1769 GUIDE WIRE: HCPCS | Performed by: STUDENT IN AN ORGANIZED HEALTH CARE EDUCATION/TRAINING PROGRAM

## 2018-09-27 PROCEDURE — 25000003 PHARM REV CODE 250: Performed by: NURSE ANESTHETIST, CERTIFIED REGISTERED

## 2018-09-27 PROCEDURE — 11000001 HC ACUTE MED/SURG PRIVATE ROOM

## 2018-09-27 PROCEDURE — 63600175 PHARM REV CODE 636 W HCPCS: Performed by: EMERGENCY MEDICINE

## 2018-09-27 PROCEDURE — 25000003 PHARM REV CODE 250: Performed by: EMERGENCY MEDICINE

## 2018-09-27 DEVICE — STENT URETERAL UNIV 6FR 28CM: Type: IMPLANTABLE DEVICE | Site: URETER | Status: FUNCTIONAL

## 2018-09-27 RX ORDER — FENTANYL CITRATE 50 UG/ML
INJECTION, SOLUTION INTRAMUSCULAR; INTRAVENOUS
Status: DISCONTINUED | OUTPATIENT
Start: 2018-09-27 | End: 2018-09-27

## 2018-09-27 RX ORDER — SODIUM CHLORIDE 0.9 % (FLUSH) 0.9 %
3 SYRINGE (ML) INJECTION
Status: DISCONTINUED | OUTPATIENT
Start: 2018-09-27 | End: 2018-09-30 | Stop reason: HOSPADM

## 2018-09-27 RX ORDER — PROPOFOL 10 MG/ML
VIAL (ML) INTRAVENOUS CONTINUOUS PRN
Status: DISCONTINUED | OUTPATIENT
Start: 2018-09-27 | End: 2018-09-27

## 2018-09-27 RX ORDER — SODIUM CHLORIDE 0.9 % (FLUSH) 0.9 %
3 SYRINGE (ML) INJECTION EVERY 8 HOURS
Status: DISCONTINUED | OUTPATIENT
Start: 2018-09-27 | End: 2018-09-30 | Stop reason: HOSPADM

## 2018-09-27 RX ORDER — CLONIDINE HYDROCHLORIDE 0.1 MG/1
0.1 TABLET ORAL EVERY 4 HOURS PRN
Status: DISCONTINUED | OUTPATIENT
Start: 2018-09-27 | End: 2018-09-30 | Stop reason: HOSPADM

## 2018-09-27 RX ORDER — SODIUM CHLORIDE, SODIUM LACTATE, POTASSIUM CHLORIDE, CALCIUM CHLORIDE 600; 310; 30; 20 MG/100ML; MG/100ML; MG/100ML; MG/100ML
INJECTION, SOLUTION INTRAVENOUS CONTINUOUS PRN
Status: DISCONTINUED | OUTPATIENT
Start: 2018-09-27 | End: 2018-09-27

## 2018-09-27 RX ORDER — FUROSEMIDE 10 MG/ML
40 INJECTION INTRAMUSCULAR; INTRAVENOUS 2 TIMES DAILY
Status: DISCONTINUED | OUTPATIENT
Start: 2018-09-27 | End: 2018-09-30 | Stop reason: HOSPADM

## 2018-09-27 RX ORDER — ONDANSETRON 2 MG/ML
4 INJECTION INTRAMUSCULAR; INTRAVENOUS ONCE AS NEEDED
Status: ACTIVE | OUTPATIENT
Start: 2018-09-27 | End: 2018-09-27

## 2018-09-27 RX ORDER — LIDOCAINE HCL/PF 100 MG/5ML
SYRINGE (ML) INTRAVENOUS
Status: DISCONTINUED | OUTPATIENT
Start: 2018-09-27 | End: 2018-09-27

## 2018-09-27 RX ADMIN — LISINOPRIL 20 MG: 20 TABLET ORAL at 09:09

## 2018-09-27 RX ADMIN — RISPERIDONE 1 MG: 0.25 TABLET, FILM COATED ORAL at 09:09

## 2018-09-27 RX ADMIN — PIPERACILLIN AND TAZOBACTAM 4.5 G: 4; .5 INJECTION, POWDER, LYOPHILIZED, FOR SOLUTION INTRAVENOUS; PARENTERAL at 05:09

## 2018-09-27 RX ADMIN — PIPERACILLIN AND TAZOBACTAM 4.5 G: 4; .5 INJECTION, POWDER, LYOPHILIZED, FOR SOLUTION INTRAVENOUS; PARENTERAL at 09:09

## 2018-09-27 RX ADMIN — DIVALPROEX SODIUM 250 MG: 250 TABLET, DELAYED RELEASE ORAL at 09:09

## 2018-09-27 RX ADMIN — FUROSEMIDE 20 MG: 10 INJECTION, SOLUTION INTRAMUSCULAR; INTRAVENOUS at 09:09

## 2018-09-27 RX ADMIN — FENTANYL CITRATE 25 MCG: 50 INJECTION, SOLUTION INTRAMUSCULAR; INTRAVENOUS at 12:09

## 2018-09-27 RX ADMIN — HYDROCODONE BITARTRATE AND ACETAMINOPHEN 1 TABLET: 5; 325 TABLET ORAL at 05:09

## 2018-09-27 RX ADMIN — FENTANYL CITRATE 50 MCG: 50 INJECTION, SOLUTION INTRAMUSCULAR; INTRAVENOUS at 12:09

## 2018-09-27 RX ADMIN — PROPOFOL 75 MCG/KG/MIN: 10 INJECTION, EMULSION INTRAVENOUS at 12:09

## 2018-09-27 RX ADMIN — LATANOPROST 1 DROP: 50 SOLUTION OPHTHALMIC at 09:09

## 2018-09-27 RX ADMIN — SODIUM CHLORIDE, SODIUM LACTATE, POTASSIUM CHLORIDE, AND CALCIUM CHLORIDE: .6; .31; .03; .02 INJECTION, SOLUTION INTRAVENOUS at 11:09

## 2018-09-27 RX ADMIN — Medication 3 ML: at 10:09

## 2018-09-27 RX ADMIN — HYDROCODONE BITARTRATE AND ACETAMINOPHEN 1 TABLET: 5; 325 TABLET ORAL at 09:09

## 2018-09-27 RX ADMIN — LIDOCAINE HYDROCHLORIDE 75 MG: 20 INJECTION, SOLUTION INTRAVENOUS at 12:09

## 2018-09-27 RX ADMIN — FUROSEMIDE 40 MG: 10 INJECTION, SOLUTION INTRAMUSCULAR; INTRAVENOUS at 05:09

## 2018-09-27 RX ADMIN — Medication 3 ML: at 02:09

## 2018-09-27 RX ADMIN — CALCIUM CARBONATE (ANTACID) CHEW TAB 500 MG 1500 MG: 500 CHEW TAB at 09:09

## 2018-09-27 RX ADMIN — PIPERACILLIN AND TAZOBACTAM 4.5 G: 4; .5 INJECTION, POWDER, LYOPHILIZED, FOR SOLUTION INTRAVENOUS; PARENTERAL at 01:09

## 2018-09-27 RX ADMIN — MEMANTINE HYDROCHLORIDE 5 MG: 5 TABLET ORAL at 09:09

## 2018-09-27 RX ADMIN — CLONIDINE HYDROCHLORIDE 0.1 MG: 0.1 TABLET ORAL at 01:09

## 2018-09-27 RX ADMIN — TRAZODONE HYDROCHLORIDE 25 MG: 50 TABLET ORAL at 09:09

## 2018-09-27 RX ADMIN — ASPIRIN 81 MG: 81 TABLET, COATED ORAL at 09:09

## 2018-09-27 RX ADMIN — ENOXAPARIN SODIUM 40 MG: 100 INJECTION SUBCUTANEOUS at 05:09

## 2018-09-27 RX ADMIN — VITAMIN B12 0.1 MG ORAL TABLET 100 MCG: 0.1 TABLET ORAL at 09:09

## 2018-09-27 RX ADMIN — DIVALPROEX SODIUM 500 MG: 125 TABLET, DELAYED RELEASE ORAL at 09:09

## 2018-09-27 NOTE — PLAN OF CARE
Patient sleeping at this time  Patient readmitted from [facility]    Went to OR today for cystoscopy with ureteral stent placements       09/27/18 1611   Discharge Assessment   Assessment Type Discharge Planning Assessment   Confirmed/corrected address and phone number on facesheet? Yes   Assessment information obtained from? Medical Record   Prior to hospitalization functional status: Wheelchair Bound   Current Functional Status: Wheelchair Bound   Facility Arrived From: [facility]   Lives With facility resident   Able to Return to Prior Arrangements yes   Is patient able to care for self after discharge? No   Who are your caregiver(s) and their phone number(s)? Liya Francisco [phone]   Patient's perception of discharge disposition nursing home   Readmission Within The Last 30 Days no previous admission in last 30 days   Patient currently being followed by outpatient case management? Unable to determine (comments)   Patient currently receives any other outside agency services? No   Equipment Currently Used at Home wheelchair;hospital bed   Do you have any problems affording any of your prescribed medications? No   Is the patient taking medications as prescribed? yes   Does the patient have transportation home? No   Transportation Available agency transportation   Discharge Plan A Return to nursing home   Patient/Family In Agreement With Plan no     Christina Tamayo, RN, CCM, CMSRN  RN Transition Navigator  [phone]

## 2018-09-27 NOTE — HOSPITAL COURSE
Pt spiked fever to 101.9 9/27 am. Urology Dr. Sorenson took to OR for stent 9/27. Tolerated well without complication. Remained afebrile. Downgraded from zosyn to ceftriaxone. Remained on supplemental oxygen despite lasix, CXR shows no acute issue. Weaned to room air. Voiding trial initiated, voided 200 cc spontaneously. D/Todd to [facility] with cefpodoxime and urology follow up.

## 2018-09-27 NOTE — PROGRESS NOTES
[facility] Hospital Medicine  Progress Note    Patient Name: Austyn Pedersen  MRN: [id number]  Patient Class: IP- Inpatient   Admission Date: 9/25/2018  Length of Stay: 1 days  Attending Physician: Elliott Garcia MD  Primary Care Provider: Primary Doctor No        Subjective:     Principal Problem:Renal abscess, right    HPI:  Ms. Austyn Pedersen is a 79 yo woman with dementia, bipolar disorder, HTN who presents with pyelonephritis.  She was taken to [facility] on 9/14/18 for syncope and urine incontinence.  She was hypotensive with blood pressure of 86/48.  Urinalysis was normal.  WBC count was 6,030.  She was given IV fluids.              She returned to [facility] on 9/20/18.  Blood pressure was normal to high this time.  Pulse was 113.  Temperature was 102.3° Fahrenheit.  Urinalysis showed 3+ protein, positive nitrite, >100 RBC/hpf, 50 WBC/hpf, few WBC clumps, and many bacteria.  WBC count was 15,530.  CT showed right nonobstructing kidney stone with pyelonephritis.  She was given ceftriaxone and 2,355 mL of normal saline.  She was admitted to [facility] and put on meropenem. She had encephalopathy with difficulty swallowing.  Her blood and urine cultures grew pansensitive E. Coli.  Sepsis resolved.   Meropenem was changed to ciprofloxacin to complete a 14 day antibiotic course.  She was discharged back to [facility] on 9/24/18.                She again returned to [facility] on 9/25 evening with fever, chest and abdominal pain. She was given nitro and aspirin PTA. BNP was 312, procal was 0.60. WBC was had increased from 10.62 9/24 to 13.02. UA showed 30 RBC and 6 WBC. Chest XR showed small pleural effusions. CT abdomen showed pyelo similar to CT on 9/20 and interval development of right renal abscess. Tmax was 101. SpO2 was 91% and pt was placed on O2 with improvement. She was given zosyn and admitted to  hospital medicine.    Hospital Course:  Pt spiked fever to 101.9 9/27 am. Urology Dr. Sorenson took to OR for stent 9/27.     Interval History: Resting in bed, appears uncomfortable. Reminded pt that she is getting surgery today. Still with abdominal and back pain.    Review of Systems   Unable to perform ROS: Dementia   Gastrointestinal: Positive for abdominal pain.     Objective:     Vital Signs (Most Recent):  Temp: 97.3 °F (36.3 °C) (09/27/18 0735)  Pulse: 77 (09/27/18 0844)  Resp: 17 (09/27/18 0844)  BP: (!) 145/88 (09/27/18 0735)  SpO2: 97 % (09/27/18 0844) Vital Signs (24h Range):  Temp:  [97.3 °F (36.3 °C)-101.9 °F (38.8 °C)] 97.3 °F (36.3 °C)  Pulse:  [66-89] 77  Resp:  [13-18] 17  SpO2:  [97 %-99 %] 97 %  BP: (103-190)/(56-91) 145/88     Weight: 84 kg (185 lb 3 oz)  Body mass index is 26.57 kg/m².    Intake/Output Summary (Last 24 hours) at 9/27/2018 1201  Last data filed at 9/27/2018 0600  Gross per 24 hour   Intake --   Output 1950 ml   Net -1950 ml      Physical Exam   Cardiovascular: Normal rate and regular rhythm.   Pulmonary/Chest: Effort normal. She has no wheezes. She has no rales.   NC in place   Abdominal: There is tenderness (R CVA and abdominal).   Musculoskeletal: She exhibits edema (trace).       Significant Labs: All pertinent labs within the past 24 hours have been reviewed.    Significant Imaging: I have reviewed all pertinent imaging results/findings within the past 24 hours.    Assessment/Plan:      * Renal abscess, right    Right pyelonephritis  Right nephrolithiasis  Pt has been readmitted one day after discharge for pyelonephritis. Had previously been on merrem and discharged on cipro. WBC increased to 13.02. Non obstructing stone present. Fever to 101.9 so urology will place stent  -Give zosyn  -Urology consulted - taking to OR this am  -Monitor labs  -Place waller to improve urine outflow        Essential hypertension    -190.   -Continue home lisinopril and  propanolol  -Monitor  -Clonidine PRN        Nursing home resident    Wheelchair dependent  Return to [facility] on d/c.        Bipolar affective disorder    Dementia without behavioral disturbance  Pt not fully oriented and somewhat upset on admission.  -Continue home depakote and risperidone  -Continue home namenda  -Delirium precautions          VTE Risk Mitigation (From admission, onward)        Ordered     enoxaparin injection 40 mg  Daily      09/26/18 0358     IP VTE LOW RISK PATIENT  Once      09/26/18 0132              YAW FitzpatrickC  Department of Hospital Medicine   [facility]

## 2018-09-27 NOTE — TRANSFER OF CARE
"Anesthesia Transfer of Care Note    Patient: Austyn Pedersen    Procedure(s) Performed: Procedure(s) (LRB):  CYSTOSCOPY, WITH URETERAL STENT INSERTION, possible retrograde pyelogram (Right)    Patient location: PACU    Anesthesia Type: MAC    Transport from OR: Transported from OR on room air with adequate spontaneous ventilation    Post pain: adequate analgesia    Post assessment: no apparent anesthetic complications    Post vital signs: stable    Level of consciousness: sedated    Nausea/Vomiting: no nausea/vomiting    Complications: none    Transfer of care protocol was followed      Last vitals:   Visit Vitals  BP (!) 120/57   Pulse 69   Temp 36.6 °C (97.9 °F) (Skin)   Resp 18   Ht 5' 10" (1.778 m)   Wt 84 kg (185 lb 3 oz)   SpO2 (!) 92%   Breastfeeding? No   BMI 26.57 kg/m²     "

## 2018-09-27 NOTE — ANESTHESIA POSTPROCEDURE EVALUATION
"Anesthesia Post Evaluation    Patient: Austyn Pedersen    Procedure(s) Performed: Procedure(s) (LRB):  CYSTOSCOPY, WITH URETERAL STENT INSERTION, possible retrograde pyelogram (Right)    [facility] Anesthesia Post Op Evaluation    Visit Vitals  /62   Pulse 68   Temp 36.6 °C (97.9 °F) (Skin)   Resp 16   Ht 5' 10" (1.778 m)   Wt 84 kg (185 lb 3 oz)   SpO2 98%   Breastfeeding? No   BMI 26.57 kg/m²       Pain/Krystina Score: Pain Assessment Performed: Yes (9/27/2018  3:07 PM)  Presence of Pain: denies (9/27/2018  3:07 PM)  Pain Rating Prior to Med Admin: 8 (9/27/2018  9:29 AM)  Pain Rating Post Med Admin: 7 (9/26/2018  4:42 PM)  Krystina Score: 8 (9/27/2018  1:08 PM)        "

## 2018-09-27 NOTE — PROGRESS NOTES
[facility]  Urology Progress Note    Problems:   Patient Active Problem List   Diagnosis    E. coli pyelonephritis    Bipolar affective disorder    Dementia without behavioral disturbance    Essential hypertension    Debility    Chronic pain    Nursing home resident    Wheelchair dependent    Right nephrolithiasis    E coli bacteremia    Constipation due to opioid therapy    Renal abscess, right    Chronic diastolic (congestive) heart failure       Subjective   101F overnight. Patient notes she feels terrible. Waller catheter draining yellow urine    Meds:   aspirin  81 mg Oral Daily    calcium carbonate  1,500 mg Oral Daily    cyanocobalamin  100 mcg Oral Daily    divalproex  250 mg Oral Daily    divalproex  500 mg Oral QHS    enoxaparin  40 mg Subcutaneous Daily    fluticasone  1 spray Each Nare Daily    furosemide  20 mg Intravenous BID    latanoprost  1 drop Right Eye QHS    lisinopril  20 mg Oral Daily    memantine  5 mg Oral Daily    piperacillin-tazobactam (ZOSYN) IVPB  4.5 g Intravenous Q8H    propranolol  60 mg Oral Daily    risperiDONE  1 mg Oral BID    traZODone  25 mg Oral QHS       acetaminophen, cloNIDine, docusate sodium, HYDROcodone-acetaminophen    Temp:  [97.9 °F (36.6 °C)-101.9 °F (38.8 °C)] 97.9 °F (36.6 °C)  Pulse:  [66-89] 76  Resp:  [13-22] 17  SpO2:  [97 %-99 %] 97 %  BP: (103-190)/(56-91) 103/56    I/O last 3 completed shifts:  In: 100 [IV Piggyback:100]  Out: 1950 [Urine:1950]    General:  Alert, cooperative, no distress, appears stated age   Head:  Normocephalic, without obvious abnormality, atraumatic   Eyes:  PERRL, conjunctiva/corneas clear   Lungs:   Respirations unlabored    Heart:  Warm and well perfused   Abdomen:   abdomen is soft without significant tenderness, masses, organomegaly or guarding   : Urethral waller draining light yellow urine   Drains: Urinary Catheter (Waller)   Extremities: Extremities normal, atraumatic, no cyanosis  or edema   Skin: Skin color, texture, turgor normal, no rashes or lesions   Psych: Appropriate   Neurologic: Non-focal     Recent Results (from the past 24 hour(s))   2D echo with color flow doppler    Collection Time: 09/26/18 11:40 AM   Result Value Ref Range    EF 65 55 - 65    Mitral Valve Regurgitation TRIVIAL     Diastolic Dysfunction Yes (A)     Mitral Valve Mobility NORMAL        Assessment   78 y.o. female with right sided pyelonephritis, UTI, fever overnight    Plan   1. Since the patient does not appear to be improving on IV antibiotics alone, I am offering a cystoscopy, right ureteral stent placement under MAC anesthesia today. This may help relieve any purulent drainage from the kidney and allow for it to pass into the bladder. No visible stone on CT that is obstructing.  2. I have explained the indication and benefits of proceeding with a right stent placement possible retrograde pyelogram with me in the operating room. Alternatives of the procedure were also discussed. The risks included but were not limited to pain, infection (urinary tract infection), bleeding (hematuria), ureteral or urethral stricture,  injury to the urethra, bladder, ureter, need for additional treatments  pain, and discomfort related to the stent were discussed in depth with the patient.       The ureteral stent was discussed at length. The patient will need to have it removed and the time period in which it should remain indwelling is to be determined after surgery. If left indwelling, the sequelae include pain, infection, lower urinary tract symptoms, development of calcifications on the ureteral stent, worsening kidney function, and complete loss of kidney function.     The patient voiced understanding and all questions have been answered and informed consents were signed.

## 2018-09-27 NOTE — INTERVAL H&P NOTE
The patient has been examined and the H&P has been reviewed:    I concur with the findings and no changes have occurred since H&P was written. see progress note from 9/27/18    Anesthesia/Surgery risks, benefits and alternative options discussed and understood by patient/family.          Active Hospital Problems    Diagnosis  POA    *Renal abscess, right [N15.1]  Yes    Chronic diastolic (congestive) heart failure [I50.32]  Yes    Dementia without behavioral disturbance [F03.90]  Yes     Chronic    Bipolar affective disorder [F31.9]  Yes     Chronic    Essential hypertension [I10]  Yes     Chronic    Nursing home resident [Z59.3]  Not Applicable     Chronic     [facility] ([address] [place] [address])      Right nephrolithiasis [N20.0]  Yes    E. coli pyelonephritis [N12, B96.20]  Yes    Wheelchair dependent [Z99.3]  Not Applicable     Chronic      Resolved Hospital Problems   No resolved problems to display.

## 2018-09-27 NOTE — OP NOTE
OPERATIVE DICTATION  DATE OF OPERATION: 9/27/2018    SERVICE: Urology    SURGEONS:  1. Laura Sorenson MD    ANESTHESIA:  Anesthesiologist: Mary Sampson MD  CRNA: Hanna Prater CRNA    STAFF:  Circulator: Laura Levine RN; Yoli Church RN  Scrub Person: Brianna KRIS Spence    ANESTHESIA: Local MAC    PREOPERATIVE DIAGNOSIS: Pre-Op Diagnosis Codes:     * Fever [R50.9]     * Renal abscess, right [N15.1]     * E coli bacteremia [R78.81]     * E. coli pyelonephritis [N12, B96.20]    POSTOPERATIVE DIAGNOSIS: Post-Op Diagnosis Codes:     * Fever [R50.9]     * Renal abscess, right [N15.1]     * E coli bacteremia [R78.81]     * E. coli pyelonephritis [N12, B96.20]    PROCEDURES:   1. Cystoscopy,  right ureteral stent placement  2. Cystoscopy, placement of right 6 Turkmen by 28 cm JJ ureteral FIRM stent OFF A STRING  3. Fluoroscopy < 1 hour  4. Interpretation of fluoroscopic images  5. Waller catheter placement by MD    COMPLICATIONS: * No complications entered in OR log *    DRAINS: None    TUBES: 16 Palestinian urethral waller    IMPLANTS: Right 6 Palestinian x 28 cm JJ FIRM ureteral stent OFF A STRING    FLUIDS: see anesthesia record     ESTIMATED BLOOD LOSS: Minimal    FINDINGS:   1. Right ureteral stent placed without any difficulty. Retrograde pyelogram confirmed that guidewire was coiled in the right renal pelvis. No purulence seen draining alongside or through the ureteral stent  2. New waller catheter placed    SPECIMEN(S):   None    CONDITION: stable    INDICATIONS FOR THE PROCEDURE:  78 y.o. female with multiple comorbidities (see past medical history below) who was seen earlier this month for a right sided pyelonephritis without an obstructing stone. She was readmitted from [facility] with reported 101 fevers which have resolved since being admitted by the hospitalist service with IV antibiotic administration. She was discharged with oral cipro and a fluoroquinolones are typically the optimal antibiotic  therapy for pyelonephritis. Her blood cultures drawn yesterday as well as urine culture demonstrated no growth of bacteria. During her prior admission blood cultures were also negative and a urine culture demonstrated a pansensitive E. Coli.   Assessment/Recommendations: 78 y.o. female with multiple comorbidities including HTN, bipolar disorder, dementia residing at a nursing home facility [facility] with right sided pyelonephritis without an obstructing stone on both CT scans from  9/20/18 and the one from yesterday 9/25/18.      CT from 9/25/18 demonstrated findings consistent with a persistent right sided pyelonephritis with the development of a 1cm right lower pole possible abscess development. Right ureter again demonstrates a thickened appearance without any obstructing calculus    After she was admitted despite IV antibiotics she developed a fever 101F overnight.  Since the patient does not appear to be improving on IV antibiotics alone, I am offering a cystoscopy, right ureteral stent placement under MAC anesthesia today. This may help relieve any purulent drainage from the kidney and allow for it to pass into the bladder. No visible stone on CT that is obstructing.    I have explained the indication and benefits of proceeding with a right stent placement possible retrograde pyelogram with me in the operating room. Alternatives of the procedure were also discussed. The risks included but were not limited to pain, infection (urinary tract infection), bleeding (hematuria), ureteral or urethral stricture,  injury to the urethra, bladder, ureter, need for additional treatments  pain, and discomfort related to the stent were discussed in depth with the patient.        The ureteral stent was discussed at length. The patient will need to have it removed and the time period in which it should remain indwelling is to be determined after surgery. If left indwelling, the sequelae include pain, infection, lower urinary  tract symptoms, development of calcifications on the ureteral stent, worsening kidney function, and complete loss of kidney function.      The patient voiced understanding and all questions have been answered and informed consents were signed.      PROCEDURE IN DETAIL:  After appropriate informed consent was obtained, the patient was taken to the operating room and placed in the supine position. After induction of General, she was placed in the dorsal lithotomy position.  Then she was prepped and draped in the usual sterile fashion.     Thereafter a WHO timeout was performed and the procedure was initiated. The rigid 22 Urdu cystoscope was inserted into the bladder via the urethra. There were no bladder tumors identified or bladder masses. No calculi. 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     I visualized the right ureteral orifice and cannulated with a Motion guidewire. I advanced the wire until I noted a coil in the renal pelvis fluoroscopically.     A 5 Guinean open ended catheter was advanced over the guidewire and the guidewire was removed. I performed a right retrograde pyelogram to confirm that the ureteral catheter was in the correct location. I injected dilute isovue through the 5 Guinean open ended catheter and it opacified the right renal pelvis. Afterwards the guidewire was readvanced through the 5 Guinean open ended catheter and the 5 Guinean removed.      Then we proceeded with the right ureteral stent placement. A 6 Urdu x 28 cm JJ ureteral stent was advanced over the guidewire. Using the radiopaque marker of the pusher the stent was positioned in the correct location. As the guidewire was being removed, the right proximal coil in the renal pelvis was formed and visualized fluoroscopically. A coil in the bladder was also noted fluoroscopically and directly cystoscopically. No purulence was seen coming through or around the ureteral stent.     A 16 Guinean urethral  waller was inserted into the bladder to drain the irrigation and urine. 10cc of sterile saline was used to inflate the balloon and the catheter was connected to a  drainage bag.    ATTENDING ATTESTATION  I was present and scrubbed for the entire duration  of the procedure.      CASE DURATION:  * Missing case tracking time(s) *    DISPOSITION:   The patient tolerated the procedure well, she was extubated, and taken to post-anesthesia care unit in satisfactory condition.  She will continue to be monitored by her vital signs, labs, and pain improvement.     Laura Sorenson MD

## 2018-09-27 NOTE — ANESTHESIA PREPROCEDURE EVALUATION
09/27/2018  Austyn Pedersen is a 78 y.o., female.dementia, bipolar disorder, HTN and pyelonephritis scheduled for cystoscopy.    Past Medical History:   Diagnosis Date    Anemia     Bipolar disorder     Chronic pain     Dementia     Depression     Extrapyramidal and movement disorder     Glaucoma     Hyperlipidemia     Hypertension     Scabies 05/05/2017     History reviewed. No pertinent surgical history.     Review of patient's allergies indicates:  No Known Allergies      Anesthesia Evaluation    I have reviewed the Patient Summary Reports.     I have reviewed the Medications.     Review of Systems  Anesthesia Hx:  Unknown anesthesia hx per brother, he is unaware of any other surgeries   Social:  Non-Smoker    Hematology/Oncology:         -- Anemia:   EENT/Dental:EENT/Dental Normal   Cardiovascular:   Exercise tolerance: poor Hypertension CHF hyperlipidemia    Pulmonary:   On 2L nasal cannular oxygen   Renal/:   Chronic Renal Disease (renal abscess and peylo)    Neurological:   +extrapyramidal movement disorder  +glaucoma Dementia    Endocrine:  Endocrine Normal    Psych:   Psychiatric History depression Bipolar         Physical Exam  General:  Well nourished     Eyes/Ears/Nose:  EYES/EARS/NOSE FINDINGS: Normal         Mental Status:  Mental Status Findings: Normal      Lab Results   Component Value Date    WBC 10.24 09/27/2018    HGB 10.3 (L) 09/27/2018    HCT 32.0 (L) 09/27/2018    MCV 93 09/27/2018     09/27/2018       Chemistry        Component Value Date/Time     09/25/2018 2050    K 3.9 09/25/2018 2050     09/25/2018 2050    CO2 28 09/25/2018 2050    BUN 17 09/25/2018 2050    CREATININE 0.8 09/25/2018 2050     (H) 09/25/2018 2050        Component Value Date/Time    CALCIUM 9.7 09/25/2018 2050    ALKPHOS 141 (H) 09/25/2018 2050    AST 38 09/25/2018 2050    ALT 47 (H)  09/25/2018 2050    BILITOT 0.5 09/25/2018 2050    ESTGFRAFRICA >60 09/25/2018 2050    EGFRNONAA >60 09/25/2018 2050            EKG 09/25/2018  Normal sinus rhythm  Normal ECG  When compared with ECG of 21-SEP-2018 04:50,  No significant change was found  Confirmed by Fausto Cortez MD ([id number]) on 9/26/2018 10:32:23 AM    TTE 09/26/18  CONCLUSIONS     1 - Normal left ventricular systolic function (EF 60-65%).     2 - No wall motion abnormalities.     3 - Impaired LV relaxation, normal LAP (grade 1 diastolic dysfunction).     4 - Normal right ventricular systolic function .     5 - Trivial mitral regurgitation.            Anesthesia Plan  Type of Anesthesia, risks & benefits discussed:  Anesthesia Type:  MAC  Patient's Preference:   Intra-op Monitoring Plan: standard ASA monitors  Intra-op Monitoring Plan Comments:   Post Op Pain Control Plan:   Post Op Pain Control Plan Comments:   Induction:   IV  Beta Blocker:  Patient is on a Beta-Blocker and has received one dose within the past 24 hours (No further documentation required).       Informed Consent: Patient representative understands risks and agrees with Anesthesia plan.  Questions answered. Anesthesia consent signed with patient representative.  ASA Score: 3     Day of Surgery Review of History & Physical: I have interviewed and examined the patient. I have reviewed the patient's H&P dated:        Anesthesia Plan Notes: Phone consent with brother  Patient does not appear oriented, was unaware of having the procedure        Ready For Surgery From Anesthesia Perspective.

## 2018-09-27 NOTE — NURSING
Spoke wih Dr. lechuga patients VS retaken after medication was administered. 184/78 T 100. He will put an order for BP. Will continue to monitor.

## 2018-09-27 NOTE — ASSESSMENT & PLAN NOTE
Right pyelonephritis  Right nephrolithiasis  Pt has been readmitted one day after discharge for pyelonephritis. Had previously been on merrem and discharged on cipro. WBC increased to 13.02. Non obstructing stone present. Fever to 101.9 so urology will place stent  -Give zosyn  -Urology consulted - taking to OR this am  -Monitor labs  -Place waller to improve urine outflow

## 2018-09-27 NOTE — SUBJECTIVE & OBJECTIVE
Interval History: Resting in bed, appears uncomfortable. Reminded pt that she is getting surgery today. Still with abdominal and back pain.    Review of Systems   Unable to perform ROS: Dementia   Gastrointestinal: Positive for abdominal pain.     Objective:     Vital Signs (Most Recent):  Temp: 97.3 °F (36.3 °C) (09/27/18 0735)  Pulse: 77 (09/27/18 0844)  Resp: 17 (09/27/18 0844)  BP: (!) 145/88 (09/27/18 0735)  SpO2: 97 % (09/27/18 0844) Vital Signs (24h Range):  Temp:  [97.3 °F (36.3 °C)-101.9 °F (38.8 °C)] 97.3 °F (36.3 °C)  Pulse:  [66-89] 77  Resp:  [13-18] 17  SpO2:  [97 %-99 %] 97 %  BP: (103-190)/(56-91) 145/88     Weight: 84 kg (185 lb 3 oz)  Body mass index is 26.57 kg/m².    Intake/Output Summary (Last 24 hours) at 9/27/2018 1201  Last data filed at 9/27/2018 0600  Gross per 24 hour   Intake --   Output 1950 ml   Net -1950 ml      Physical Exam   Cardiovascular: Normal rate and regular rhythm.   Pulmonary/Chest: Effort normal. She has no wheezes. She has no rales.   NC in place   Abdominal: There is tenderness (R CVA and abdominal).   Musculoskeletal: She exhibits edema (trace).       Significant Labs: All pertinent labs within the past 24 hours have been reviewed.    Significant Imaging: I have reviewed all pertinent imaging results/findings within the past 24 hours.

## 2018-09-27 NOTE — PLAN OF CARE
Problem: Patient Care Overview  Goal: Plan of Care Review  Outcome: Ongoing (interventions implemented as appropriate)  Plan of care reviewed patient verbalized understanding. Pena catheter intact, dry, and cleaned. Medications administered, IV anitbiotics  Infused. Safety maintained bed alarm on, call bell in reach, bed in the lowest position. Will continue to monitor.

## 2018-09-27 NOTE — NURSING
Patient VS at midnight 190/91 HR88 T 101.9. No signs of distress noted. Will  Administered tylenol for fever. Will notify Dr. Jansen. Will continue to monitor.

## 2018-09-27 NOTE — PLAN OF CARE
Problem: Patient Care Overview  Goal: Plan of Care Review  Outcome: Ongoing (interventions implemented as appropriate)  Patient on oxygen with documented flow.  Will attempt to wean per O2 order protocol. No resp distress noted. Will continue to monitor.

## 2018-09-28 LAB
ANION GAP SERPL CALC-SCNC: 11 MMOL/L
BACTERIA BLD CULT: NORMAL
BASOPHILS # BLD AUTO: 0.05 K/UL
BASOPHILS NFR BLD: 0.5 %
BUN SERPL-MCNC: 14 MG/DL
CALCIUM SERPL-MCNC: 9.4 MG/DL
CHLORIDE SERPL-SCNC: 98 MMOL/L
CO2 SERPL-SCNC: 30 MMOL/L
CREAT SERPL-MCNC: 0.9 MG/DL
DIFFERENTIAL METHOD: ABNORMAL
EOSINOPHIL # BLD AUTO: 0.3 K/UL
EOSINOPHIL NFR BLD: 2.9 %
ERYTHROCYTE [DISTWIDTH] IN BLOOD BY AUTOMATED COUNT: 14.2 %
EST. GFR  (AFRICAN AMERICAN): >60 ML/MIN/1.73 M^2
EST. GFR  (NON AFRICAN AMERICAN): >60 ML/MIN/1.73 M^2
GLUCOSE SERPL-MCNC: 102 MG/DL
HCT VFR BLD AUTO: 33.9 %
HGB BLD-MCNC: 10.8 G/DL
LYMPHOCYTES # BLD AUTO: 2.3 K/UL
LYMPHOCYTES NFR BLD: 22.8 %
MCH RBC QN AUTO: 29.8 PG
MCHC RBC AUTO-ENTMCNC: 31.9 G/DL
MCV RBC AUTO: 93 FL
MONOCYTES # BLD AUTO: 1.1 K/UL
MONOCYTES NFR BLD: 11.2 %
NEUTROPHILS # BLD AUTO: 5.9 K/UL
NEUTROPHILS NFR BLD: 58.9 %
PLATELET # BLD AUTO: 356 K/UL
PMV BLD AUTO: 10.1 FL
POCT GLUCOSE: 111 MG/DL (ref 70–110)
POTASSIUM SERPL-SCNC: 3.8 MMOL/L
RBC # BLD AUTO: 3.63 M/UL
SODIUM SERPL-SCNC: 139 MMOL/L
WBC # BLD AUTO: 10 K/UL

## 2018-09-28 PROCEDURE — 99233 SBSQ HOSP IP/OBS HIGH 50: CPT | Mod: ,,, | Performed by: STUDENT IN AN ORGANIZED HEALTH CARE EDUCATION/TRAINING PROGRAM

## 2018-09-28 PROCEDURE — 63600175 PHARM REV CODE 636 W HCPCS: Performed by: HOSPITALIST

## 2018-09-28 PROCEDURE — 63600175 PHARM REV CODE 636 W HCPCS: Performed by: PHYSICIAN ASSISTANT

## 2018-09-28 PROCEDURE — A4216 STERILE WATER/SALINE, 10 ML: HCPCS | Performed by: ANESTHESIOLOGY

## 2018-09-28 PROCEDURE — 25000003 PHARM REV CODE 250: Performed by: PHYSICIAN ASSISTANT

## 2018-09-28 PROCEDURE — 94760 N-INVAS EAR/PLS OXIMETRY 1: CPT

## 2018-09-28 PROCEDURE — 80048 BASIC METABOLIC PNL TOTAL CA: CPT

## 2018-09-28 PROCEDURE — 25000003 PHARM REV CODE 250: Performed by: HOSPITALIST

## 2018-09-28 PROCEDURE — 25000003 PHARM REV CODE 250: Performed by: EMERGENCY MEDICINE

## 2018-09-28 PROCEDURE — 36415 COLL VENOUS BLD VENIPUNCTURE: CPT

## 2018-09-28 PROCEDURE — 63600175 PHARM REV CODE 636 W HCPCS: Performed by: EMERGENCY MEDICINE

## 2018-09-28 PROCEDURE — 25000003 PHARM REV CODE 250: Performed by: ANESTHESIOLOGY

## 2018-09-28 PROCEDURE — 85025 COMPLETE CBC W/AUTO DIFF WBC: CPT

## 2018-09-28 PROCEDURE — 27000221 HC OXYGEN, UP TO 24 HOURS

## 2018-09-28 PROCEDURE — 94761 N-INVAS EAR/PLS OXIMETRY MLT: CPT

## 2018-09-28 PROCEDURE — 11000001 HC ACUTE MED/SURG PRIVATE ROOM

## 2018-09-28 RX ORDER — POLYETHYLENE GLYCOL 3350 17 G/17G
17 POWDER, FOR SOLUTION ORAL DAILY
Status: DISCONTINUED | OUTPATIENT
Start: 2018-09-29 | End: 2018-09-30

## 2018-09-28 RX ORDER — AMOXICILLIN 250 MG
1 CAPSULE ORAL 2 TIMES DAILY
Status: DISCONTINUED | OUTPATIENT
Start: 2018-09-28 | End: 2018-09-30

## 2018-09-28 RX ORDER — BISACODYL 5 MG
10 TABLET, DELAYED RELEASE (ENTERIC COATED) ORAL ONCE
Status: COMPLETED | OUTPATIENT
Start: 2018-09-28 | End: 2018-09-28

## 2018-09-28 RX ORDER — KETOROLAC TROMETHAMINE 30 MG/ML
30 INJECTION, SOLUTION INTRAMUSCULAR; INTRAVENOUS EVERY 6 HOURS PRN
Status: DISCONTINUED | OUTPATIENT
Start: 2018-09-28 | End: 2018-09-30 | Stop reason: HOSPADM

## 2018-09-28 RX ADMIN — HYDROCODONE BITARTRATE AND ACETAMINOPHEN 1 TABLET: 5; 325 TABLET ORAL at 03:09

## 2018-09-28 RX ADMIN — MEMANTINE HYDROCHLORIDE 5 MG: 5 TABLET ORAL at 09:09

## 2018-09-28 RX ADMIN — Medication 3 ML: at 09:09

## 2018-09-28 RX ADMIN — Medication 3 ML: at 02:09

## 2018-09-28 RX ADMIN — FLUTICASONE PROPIONATE 50 MCG: 50 SPRAY, METERED NASAL at 09:09

## 2018-09-28 RX ADMIN — STANDARDIZED SENNA CONCENTRATE AND DOCUSATE SODIUM 1 TABLET: 8.6; 5 TABLET, FILM COATED ORAL at 09:09

## 2018-09-28 RX ADMIN — RISPERIDONE 1 MG: 0.25 TABLET, FILM COATED ORAL at 09:09

## 2018-09-28 RX ADMIN — PIPERACILLIN AND TAZOBACTAM 4.5 G: 4; .5 INJECTION, POWDER, LYOPHILIZED, FOR SOLUTION INTRAVENOUS; PARENTERAL at 01:09

## 2018-09-28 RX ADMIN — FUROSEMIDE 40 MG: 10 INJECTION, SOLUTION INTRAMUSCULAR; INTRAVENOUS at 09:09

## 2018-09-28 RX ADMIN — FUROSEMIDE 40 MG: 10 INJECTION, SOLUTION INTRAMUSCULAR; INTRAVENOUS at 05:09

## 2018-09-28 RX ADMIN — LISINOPRIL 20 MG: 20 TABLET ORAL at 09:09

## 2018-09-28 RX ADMIN — HYDROCODONE BITARTRATE AND ACETAMINOPHEN 1 TABLET: 5; 325 TABLET ORAL at 09:09

## 2018-09-28 RX ADMIN — BISACODYL 10 MG: 5 TABLET, COATED ORAL at 06:09

## 2018-09-28 RX ADMIN — Medication 3 ML: at 06:09

## 2018-09-28 RX ADMIN — CALCIUM CARBONATE (ANTACID) CHEW TAB 500 MG 1500 MG: 500 CHEW TAB at 09:09

## 2018-09-28 RX ADMIN — TRAZODONE HYDROCHLORIDE 25 MG: 50 TABLET ORAL at 09:09

## 2018-09-28 RX ADMIN — LATANOPROST 1 DROP: 50 SOLUTION OPHTHALMIC at 09:09

## 2018-09-28 RX ADMIN — ENOXAPARIN SODIUM 40 MG: 100 INJECTION SUBCUTANEOUS at 05:09

## 2018-09-28 RX ADMIN — PROPRANOLOL HYDROCHLORIDE 60 MG: 60 CAPSULE, EXTENDED RELEASE ORAL at 09:09

## 2018-09-28 RX ADMIN — ASPIRIN 81 MG: 81 TABLET, COATED ORAL at 09:09

## 2018-09-28 RX ADMIN — CEFTRIAXONE SODIUM 2 G: 2 INJECTION, POWDER, FOR SOLUTION INTRAMUSCULAR; INTRAVENOUS at 09:09

## 2018-09-28 RX ADMIN — KETOROLAC TROMETHAMINE 30 MG: 30 INJECTION, SOLUTION INTRAMUSCULAR at 02:09

## 2018-09-28 RX ADMIN — VITAMIN B12 0.1 MG ORAL TABLET 100 MCG: 0.1 TABLET ORAL at 09:09

## 2018-09-28 RX ADMIN — DIVALPROEX SODIUM 250 MG: 250 TABLET, DELAYED RELEASE ORAL at 09:09

## 2018-09-28 RX ADMIN — DIVALPROEX SODIUM 500 MG: 125 TABLET, DELAYED RELEASE ORAL at 09:09

## 2018-09-28 NOTE — ANESTHESIA POSTPROCEDURE EVALUATION
"Anesthesia Post Evaluation    Patient: Austyn Pedersen    Procedure(s) Performed: Procedure(s) (LRB):  CYSTOSCOPY, WITH URETERAL STENT INSERTION, possible retrograde pyelogram (Right)    Final Anesthesia Type: general  Patient location during evaluation: PACU  Patient participation: Yes- Able to Participate  Level of consciousness: awake and alert, oriented and awake  Post-procedure vital signs: reviewed and stable  Pain management: adequate  Airway patency: patent  PONV status at discharge: No PONV  Anesthetic complications: no      Cardiovascular status: blood pressure returned to baseline  Respiratory status: unassisted and room air  Hydration status: euvolemic  Follow-up not needed.        Visit Vitals  BP (!) 141/70 (Patient Position: Lying)   Pulse 94   Temp 36.8 °C (98.3 °F) (Oral)   Resp 18   Ht 5' 10" (1.778 m)   Wt 84 kg (185 lb 3 oz)   SpO2 96%   Breastfeeding? No   BMI 26.57 kg/m²       Pain/Krystina Score: Pain Assessment Performed: Yes (9/28/2018  5:00 AM)  Presence of Pain: denies (9/28/2018  5:00 AM)  Pain Rating Prior to Med Admin: 7 (9/27/2018  5:08 PM)  Krystina Score: 8 (9/27/2018  1:08 PM)        "

## 2018-09-28 NOTE — PLAN OF CARE
Problem: Patient Care Overview  Goal: Plan of Care Review  Outcome: Ongoing (interventions implemented as appropriate)  Plan of care reviewed patient verbalized understanding. Pena care provided. IV and oral medications administered. Turned patient every 2 hours. Bed in lowest position call bell within reach, bed alarm on. Will continue to monitor.

## 2018-09-28 NOTE — PLAN OF CARE
09/28/18 1315   Medicare Message   Important Message from Medicare regarding Discharge Appeal Rights Given to patient/caregiver;Explained to patient/caregiver;Signed/date by patient/caregiver   Date IMM was signed 09/28/18   Time IMM was signed 0905

## 2018-09-28 NOTE — ASSESSMENT & PLAN NOTE
Dementia without behavioral disturbance  Pt not fully oriented and somewhat upset on exam.  -Continue home depakote and risperidone  -Continue home namenda  -Delirium precautions

## 2018-09-28 NOTE — PROGRESS NOTES
[facility]  Progress Note    Patient Name: Austyn Pedersen  MRN: [id number]  Patient Class: IP- Inpatient   Admission Date: 9/25/2018  Length of Stay: 2 days  Attending Physician: Elliott Garcia MD  Primary Care Provider: Primary Doctor No        Subjective:     Principal Problem:Renal abscess, right    HPI:  Ms. Austyn Pedersen is a 77 yo woman with dementia, bipolar disorder, HTN who presents with pyelonephritis.  She was taken to [facility] on 9/14/18 for syncope and urine incontinence.  She was hypotensive with blood pressure of 86/48.  Urinalysis was normal.  WBC count was 6,030.  She was given IV fluids.              She returned to [facility] on 9/20/18.  Blood pressure was normal to high this time.  Pulse was 113.  Temperature was 102.3° Fahrenheit.  Urinalysis showed 3+ protein, positive nitrite, >100 RBC/hpf, 50 WBC/hpf, few WBC clumps, and many bacteria.  WBC count was 15,530.  CT showed right nonobstructing kidney stone with pyelonephritis.  She was given ceftriaxone and 2,355 mL of normal saline.  She was admitted to [facility] and put on meropenem. She had encephalopathy with difficulty swallowing.  Her blood and urine cultures grew pansensitive E. Coli.  Sepsis resolved.   Meropenem was changed to ciprofloxacin to complete a 14 day antibiotic course.  She was discharged back to [facility] on 9/24/18.                She again returned to [facility] on 9/25 evening with fever, chest and abdominal pain. She was given nitro and aspirin PTA. BNP was 312, procal was 0.60. WBC was had increased from 10.62 9/24 to 13.02. UA showed 30 RBC and 6 WBC. Chest XR showed small pleural effusions. CT abdomen showed pyelo similar to CT on 9/20 and interval development of right renal abscess. Tmax was 101. SpO2 was 91% and pt was placed on O2 with improvement. She was given zosyn and admitted to  hospital medicine.    Hospital Course:  Pt spiked fever to 101.9 9/27 am. Urology Dr. Sorenson took to OR for stent 9/27. Tolerated well without complication. Remains afebrile. Continuing on Zosyn.    Interval History: Reports everything hurts. Refuses to participate in exam.     Review of Systems   Respiratory: Negative for shortness of breath.    Cardiovascular: Negative for chest pain.   Gastrointestinal: Positive for abdominal pain.   Musculoskeletal: Positive for back pain.     Objective:     Vital Signs (Most Recent):  Temp: 97.3 °F (36.3 °C) (09/28/18 0715)  Pulse: 86 (09/28/18 0715)  Resp: 17 (09/28/18 0715)  BP: (!) 142/63 (09/28/18 0715)  SpO2: 96 % (09/28/18 0532) Vital Signs (24h Range):  Temp:  [97.3 °F (36.3 °C)-100.2 °F (37.9 °C)] 97.3 °F (36.3 °C)  Pulse:  [] 86  Resp:  [14-22] 17  SpO2:  [92 %-100 %] 96 %  BP: (104-178)/(55-88) 142/63     Weight: 84 kg (185 lb 3 oz)  Body mass index is 26.57 kg/m².    Intake/Output Summary (Last 24 hours) at 9/28/2018 0956  Last data filed at 9/28/2018 0600  Gross per 24 hour   Intake 200 ml   Output 2351 ml   Net -2151 ml      Physical Exam   Cardiovascular: Normal rate and regular rhythm.   Pulmonary/Chest: Effort normal.   Abdominal: Soft.   Neurological: She is alert.       Significant Labs: All pertinent labs within the past 24 hours have been reviewed.    Significant Imaging: I have reviewed all pertinent imaging results/findings within the past 24 hours.    Assessment/Plan:      * Renal abscess, right    Right pyelonephritis  Right nephrolithiasis  Pt has been readmitted one day after discharge for pyelonephritis. Had previously been on merrem and discharged on cipro. WBC increased to 13.02. Non obstructing stone present. Fever to 101.9 so urology placed stent  -Give zosyn  -Urology consulted - took to OR 9/27  -Monitor labs - remaining stable  -Place waller to improve urine outflow        Essential hypertension    -178.   -Continue home lisinopril  and propanolol  -Monitor  -Clonidine PRN        Bipolar affective disorder    Dementia without behavioral disturbance  Pt not fully oriented and somewhat upset on exam.  -Continue home depakote and risperidone  -Continue home namenda  -Delirium precautions        Chronic diastolic (congestive) heart failure    Echo showing grade I diastolic dysfunction. EF 65%. .   -Lasix 40 mg BID  -Wean O2        Nursing home resident    Wheelchair dependent  Return to [facility] on d/c.          VTE Risk Mitigation (From admission, onward)        Ordered     enoxaparin injection 40 mg  Daily      09/26/18 0358     IP VTE LOW RISK PATIENT  Once      09/26/18 0132              YAW FitzpatrickC  Department of Hospital Medicine   [facility]

## 2018-09-28 NOTE — ASSESSMENT & PLAN NOTE
Right pyelonephritis  Right nephrolithiasis  Pt has been readmitted one day after discharge for pyelonephritis. Had previously been on merrem and discharged on cipro. WBC increased to 13.02. Non obstructing stone present. Fever to 101.9 so urology placed stent  -Give zosyn  -Urology consulted - took to OR 9/27  -Monitor labs - remaining stable  -Place waller to improve urine outflow

## 2018-09-28 NOTE — PLAN OF CARE
Problem: Patient Care Overview  Goal: Plan of Care Review  POC discussed with pt. Pt is awake and alert,oriented to person and place only.No distress noted. Denies any pain at this time. Pena in place. Bed in lowest position. Safety/Fall precautions maintained. Call bell in reach. All questions answered. Verbalized understanding.Will continue to monitor.

## 2018-09-28 NOTE — PLAN OF CARE
Problem: Patient Care Overview  Goal: Plan of Care Review  Outcome: Ongoing (interventions implemented as appropriate)  pts o2 sats 96%/2l-nc

## 2018-09-28 NOTE — SUBJECTIVE & OBJECTIVE
Interval History: Reports everything hurts. Refuses to participate in exam.     Review of Systems   Respiratory: Negative for shortness of breath.    Cardiovascular: Negative for chest pain.   Gastrointestinal: Positive for abdominal pain.   Musculoskeletal: Positive for back pain.     Objective:     Vital Signs (Most Recent):  Temp: 97.3 °F (36.3 °C) (09/28/18 0715)  Pulse: 86 (09/28/18 0715)  Resp: 17 (09/28/18 0715)  BP: (!) 142/63 (09/28/18 0715)  SpO2: 96 % (09/28/18 0532) Vital Signs (24h Range):  Temp:  [97.3 °F (36.3 °C)-100.2 °F (37.9 °C)] 97.3 °F (36.3 °C)  Pulse:  [] 86  Resp:  [14-22] 17  SpO2:  [92 %-100 %] 96 %  BP: (104-178)/(55-88) 142/63     Weight: 84 kg (185 lb 3 oz)  Body mass index is 26.57 kg/m².    Intake/Output Summary (Last 24 hours) at 9/28/2018 0956  Last data filed at 9/28/2018 0600  Gross per 24 hour   Intake 200 ml   Output 2351 ml   Net -2151 ml      Physical Exam   Cardiovascular: Normal rate and regular rhythm.   Pulmonary/Chest: Effort normal.   Abdominal: Soft.   Neurological: She is alert.       Significant Labs: All pertinent labs within the past 24 hours have been reviewed.    Significant Imaging: I have reviewed all pertinent imaging results/findings within the past 24 hours.

## 2018-09-28 NOTE — PLAN OF CARE
Patient is [facility] resident of [facility]  Patient has cystoscopy with stent placement on 9/27  Send updated clinical notes to NH via [facility].    Sent epic message to Dr. Sorenson for followup visit after discharge    Per MD, possible discharge over the weekend.    No future appointments.         09/28/18 1010   Discharge Reassessment   Assessment Type Discharge Planning Reassessment   Provided patient/caregiver education on the expected discharge date and the discharge plan Yes   Discharge Plan A Return to nursing home

## 2018-09-28 NOTE — PROGRESS NOTES
"[facility]  Urology Progress Note    Problems:   Patient Active Problem List   Diagnosis    E. coli pyelonephritis    Bipolar affective disorder    Dementia without behavioral disturbance    Essential hypertension    Debility    Chronic pain    Nursing home resident    Wheelchair dependent    Right nephrolithiasis    E coli bacteremia    Constipation due to opioid therapy    Renal abscess, right    Chronic diastolic (congestive) heart failure       Subjective   No elevated fevers overnight, 100F. Patient tolerating diet. She is not very conversational today, states she still "hurts all over" and believes I am with dietary and asks me to take her tray away    Meds:   aspirin  81 mg Oral Daily    calcium carbonate  1,500 mg Oral Daily    cefTRIAXone (ROCEPHIN) IVPB  2 g Intravenous Q24H    cyanocobalamin  100 mcg Oral Daily    divalproex  250 mg Oral Daily    divalproex  500 mg Oral QHS    enoxaparin  40 mg Subcutaneous Daily    fluticasone  1 spray Each Nare Daily    furosemide  40 mg Intravenous BID    latanoprost  1 drop Right Eye QHS    lisinopril  20 mg Oral Daily    memantine  5 mg Oral Daily    propranolol  60 mg Oral Daily    risperiDONE  1 mg Oral BID    sodium chloride 0.9%  3 mL Intravenous Q8H    traZODone  25 mg Oral QHS       acetaminophen, cloNIDine, docusate sodium, HYDROcodone-acetaminophen, sodium chloride 0.9%    Temp:  [96.7 °F (35.9 °C)-100.2 °F (37.9 °C)] 96.7 °F (35.9 °C)  Pulse:  [] 86  Resp:  [14-18] 18  SpO2:  [95 %-99 %] 96 %  BP: (124-178)/(62-88) 143/69    I/O last 3 completed shifts:  In: 200 [I.V.:200]  Out: 2901 [Urine:2900; Stool:1]    General:  Alert, cooperative, no distress, appears stated age   Head:  Normocephalic, without obvious abnormality, atraumatic   Eyes:  PERRL, conjunctiva/corneas clear   Lungs:   Respirations unlabored    Heart:  Warm and well perfused   Abdomen:   Patient does not want to be examined   : Urethral " waller draining clear urine   Drains: Urinary Catheter (Waller)   Extremities: Extremities normal, atraumatic, no cyanosis or edema   Skin: Skin color, texture, turgor normal, no rashes or lesions   Psych: Appropriate   Neurologic: Non-focal     Recent Results (from the past 24 hour(s))   CBC auto differential    Collection Time: 09/28/18  5:55 AM   Result Value Ref Range    WBC 10.00 3.90 - 12.70 K/uL    RBC 3.63 (L) 4.00 - 5.40 M/uL    Hemoglobin 10.8 (L) 12.0 - 16.0 g/dL    Hematocrit 33.9 (L) 37.0 - 48.5 %    MCV 93 82 - 98 fL    MCH 29.8 27.0 - 31.0 pg    MCHC 31.9 (L) 32.0 - 36.0 g/dL    RDW 14.2 11.5 - 14.5 %    Platelets 356 (H) 150 - 350 K/uL    MPV 10.1 9.2 - 12.9 fL    Gran # (ANC) 5.9 1.8 - 7.7 K/uL    Lymph # 2.3 1.0 - 4.8 K/uL    Mono # 1.1 (H) 0.3 - 1.0 K/uL    Eos # 0.3 0.0 - 0.5 K/uL    Baso # 0.05 0.00 - 0.20 K/uL    Gran% 58.9 38.0 - 73.0 %    Lymph% 22.8 18.0 - 48.0 %    Mono% 11.2 4.0 - 15.0 %    Eosinophil% 2.9 0.0 - 8.0 %    Basophil% 0.5 0.0 - 1.9 %    Differential Method Automated    Basic metabolic panel    Collection Time: 09/28/18  5:55 AM   Result Value Ref Range    Sodium 139 136 - 145 mmol/L    Potassium 3.8 3.5 - 5.1 mmol/L    Chloride 98 95 - 110 mmol/L    CO2 30 (H) 23 - 29 mmol/L    Glucose 102 70 - 110 mg/dL    BUN, Bld 14 8 - 23 mg/dL    Creatinine 0.9 0.5 - 1.4 mg/dL    Calcium 9.4 8.7 - 10.5 mg/dL    Anion Gap 11 8 - 16 mmol/L    eGFR if African American >60 >60 mL/min/1.73 m^2    eGFR if non African American >60 >60 mL/min/1.73 m^2       Assessment   78 y.o. female with right sided pyelonephritis, UTI, and no obstructing stone    Plan   1. Since the patient does not appear to be improving on IV antibiotics alone, I offered to perform a cystoscopy, right ureteral stent placement under MAC anesthesia on 9/27/18. She tolerated the procedure well. No purulence was identified after the stent was placed.  2. Can remove waller tomorrow morning if patient remains afebrile.  3. Will have  patient see me in clinic in about 3 weeks for a symptoms followup. Will order labs and a CT scan. If labs and CT demonstrate favorable findings, will perform cystoscopy, stent removal in about 1-2 weeks after CT.

## 2018-09-28 NOTE — PLAN OF CARE
Problem: Patient Care Overview  Goal: Plan of Care Review  Outcome: Ongoing (interventions implemented as appropriate)  pts o2 sats 95%/2l-nc

## 2018-09-29 LAB
ANION GAP SERPL CALC-SCNC: 10 MMOL/L
BASOPHILS # BLD AUTO: 0.02 K/UL
BASOPHILS NFR BLD: 0.2 %
BUN SERPL-MCNC: 17 MG/DL
CALCIUM SERPL-MCNC: 9.9 MG/DL
CHLORIDE SERPL-SCNC: 100 MMOL/L
CO2 SERPL-SCNC: 32 MMOL/L
CREAT SERPL-MCNC: 0.8 MG/DL
DIFFERENTIAL METHOD: ABNORMAL
EOSINOPHIL # BLD AUTO: 0.3 K/UL
EOSINOPHIL NFR BLD: 2.9 %
ERYTHROCYTE [DISTWIDTH] IN BLOOD BY AUTOMATED COUNT: 14.1 %
EST. GFR  (AFRICAN AMERICAN): >60 ML/MIN/1.73 M^2
EST. GFR  (NON AFRICAN AMERICAN): >60 ML/MIN/1.73 M^2
GLUCOSE SERPL-MCNC: 100 MG/DL
HCT VFR BLD AUTO: 33.4 %
HGB BLD-MCNC: 10.5 G/DL
LYMPHOCYTES # BLD AUTO: 1.6 K/UL
LYMPHOCYTES NFR BLD: 14.8 %
MCH RBC QN AUTO: 29.2 PG
MCHC RBC AUTO-ENTMCNC: 31.4 G/DL
MCV RBC AUTO: 93 FL
MONOCYTES # BLD AUTO: 1 K/UL
MONOCYTES NFR BLD: 9.3 %
NEUTROPHILS # BLD AUTO: 7.9 K/UL
NEUTROPHILS NFR BLD: 71.2 %
PLATELET # BLD AUTO: 352 K/UL
PMV BLD AUTO: 9.8 FL
POCT GLUCOSE: 117 MG/DL (ref 70–110)
POCT GLUCOSE: 94 MG/DL (ref 70–110)
POCT GLUCOSE: 97 MG/DL (ref 70–110)
POCT GLUCOSE: 99 MG/DL (ref 70–110)
POTASSIUM SERPL-SCNC: 3.9 MMOL/L
RBC # BLD AUTO: 3.59 M/UL
SODIUM SERPL-SCNC: 142 MMOL/L
WBC # BLD AUTO: 11.06 K/UL

## 2018-09-29 PROCEDURE — 36415 COLL VENOUS BLD VENIPUNCTURE: CPT

## 2018-09-29 PROCEDURE — 80048 BASIC METABOLIC PNL TOTAL CA: CPT

## 2018-09-29 PROCEDURE — 63600175 PHARM REV CODE 636 W HCPCS: Performed by: HOSPITALIST

## 2018-09-29 PROCEDURE — 85025 COMPLETE CBC W/AUTO DIFF WBC: CPT

## 2018-09-29 PROCEDURE — 25000003 PHARM REV CODE 250: Performed by: PHYSICIAN ASSISTANT

## 2018-09-29 PROCEDURE — 25000003 PHARM REV CODE 250: Performed by: HOSPITALIST

## 2018-09-29 PROCEDURE — 63600175 PHARM REV CODE 636 W HCPCS: Performed by: PHYSICIAN ASSISTANT

## 2018-09-29 PROCEDURE — 94761 N-INVAS EAR/PLS OXIMETRY MLT: CPT

## 2018-09-29 PROCEDURE — 25000003 PHARM REV CODE 250: Performed by: EMERGENCY MEDICINE

## 2018-09-29 PROCEDURE — 27000221 HC OXYGEN, UP TO 24 HOURS

## 2018-09-29 PROCEDURE — A4216 STERILE WATER/SALINE, 10 ML: HCPCS | Performed by: ANESTHESIOLOGY

## 2018-09-29 PROCEDURE — 11000001 HC ACUTE MED/SURG PRIVATE ROOM

## 2018-09-29 PROCEDURE — 25000003 PHARM REV CODE 250: Performed by: ANESTHESIOLOGY

## 2018-09-29 PROCEDURE — 25000242 PHARM REV CODE 250 ALT 637 W/ HCPCS: Performed by: HOSPITALIST

## 2018-09-29 RX ORDER — BISACODYL 5 MG
10 TABLET, DELAYED RELEASE (ENTERIC COATED) ORAL ONCE
Status: DISCONTINUED | OUTPATIENT
Start: 2018-09-29 | End: 2018-09-30 | Stop reason: HOSPADM

## 2018-09-29 RX ADMIN — FUROSEMIDE 40 MG: 10 INJECTION, SOLUTION INTRAMUSCULAR; INTRAVENOUS at 05:09

## 2018-09-29 RX ADMIN — TRAZODONE HYDROCHLORIDE 25 MG: 50 TABLET ORAL at 08:09

## 2018-09-29 RX ADMIN — STANDARDIZED SENNA CONCENTRATE AND DOCUSATE SODIUM 1 TABLET: 8.6; 5 TABLET, FILM COATED ORAL at 08:09

## 2018-09-29 RX ADMIN — HYDROCODONE BITARTRATE AND ACETAMINOPHEN 1 TABLET: 5; 325 TABLET ORAL at 02:09

## 2018-09-29 RX ADMIN — RISPERIDONE 1 MG: 0.25 TABLET, FILM COATED ORAL at 09:09

## 2018-09-29 RX ADMIN — Medication 3 ML: at 07:09

## 2018-09-29 RX ADMIN — DIVALPROEX SODIUM 250 MG: 250 TABLET, DELAYED RELEASE ORAL at 08:09

## 2018-09-29 RX ADMIN — POLYETHYLENE GLYCOL 3350 17 G: 17 POWDER, FOR SOLUTION ORAL at 09:09

## 2018-09-29 RX ADMIN — ASPIRIN 81 MG: 81 TABLET, COATED ORAL at 08:09

## 2018-09-29 RX ADMIN — KETOROLAC TROMETHAMINE 30 MG: 30 INJECTION, SOLUTION INTRAMUSCULAR at 05:09

## 2018-09-29 RX ADMIN — MEMANTINE HYDROCHLORIDE 5 MG: 5 TABLET ORAL at 08:09

## 2018-09-29 RX ADMIN — DIVALPROEX SODIUM 500 MG: 125 TABLET, DELAYED RELEASE ORAL at 08:09

## 2018-09-29 RX ADMIN — Medication 3 ML: at 11:09

## 2018-09-29 RX ADMIN — VITAMIN B12 0.1 MG ORAL TABLET 100 MCG: 0.1 TABLET ORAL at 08:09

## 2018-09-29 RX ADMIN — CALCIUM CARBONATE (ANTACID) CHEW TAB 500 MG 1500 MG: 500 CHEW TAB at 09:09

## 2018-09-29 RX ADMIN — RISPERIDONE 1 MG: 0.25 TABLET, FILM COATED ORAL at 08:09

## 2018-09-29 RX ADMIN — CEFTRIAXONE SODIUM 2 G: 2 INJECTION, POWDER, FOR SOLUTION INTRAMUSCULAR; INTRAVENOUS at 09:09

## 2018-09-29 RX ADMIN — FUROSEMIDE 40 MG: 10 INJECTION, SOLUTION INTRAMUSCULAR; INTRAVENOUS at 09:09

## 2018-09-29 RX ADMIN — ENOXAPARIN SODIUM 40 MG: 100 INJECTION SUBCUTANEOUS at 05:09

## 2018-09-29 RX ADMIN — Medication 3 ML: at 02:09

## 2018-09-29 RX ADMIN — LISINOPRIL 20 MG: 20 TABLET ORAL at 08:09

## 2018-09-29 RX ADMIN — KETOROLAC TROMETHAMINE 30 MG: 30 INJECTION, SOLUTION INTRAMUSCULAR at 08:09

## 2018-09-29 RX ADMIN — PROPRANOLOL HYDROCHLORIDE 60 MG: 60 CAPSULE, EXTENDED RELEASE ORAL at 08:09

## 2018-09-29 RX ADMIN — FLUTICASONE PROPIONATE 50 MCG: 50 SPRAY, METERED NASAL at 02:09

## 2018-09-29 RX ADMIN — LATANOPROST 1 DROP: 50 SOLUTION OPHTHALMIC at 08:09

## 2018-09-29 NOTE — PLAN OF CARE
"Problem: Fall Risk (Adult)  Goal: Absence of Falls  Patient will demonstrate the desired outcomes by discharge/transition of care.  Bed alarm on and bed in lowest position. Call bell in reach. Instructed to call for assistance before attempting to get out of bed. States" OK" Q 2 hr rounding maintained.      "

## 2018-09-29 NOTE — ASSESSMENT & PLAN NOTE
Dementia without behavioral disturbance  Pt not fully oriented and somewhat upset consistently on exam.  -Continue home depakote and risperidone  -Continue home namenda  -Delirium precautions

## 2018-09-29 NOTE — ASSESSMENT & PLAN NOTE
Right pyelonephritis  Right nephrolithiasis  Pt has been readmitted one day after discharge for pyelonephritis. Had previously been on merrem and discharged on cipro. WBC increased to 13.02. Non obstructing stone present. Fever to 101.9 so urology placed stent, has remained afebrile since then  -Gave zosyn and downgraded to ceftriaxone  -Urology consulted - took to OR 9/27 for stent  -Monitor labs - remaining stable  -Place waller to improve urine outflow

## 2018-09-29 NOTE — PLAN OF CARE
Problem: Pressure Ulcer Risk (Brendan Scale) (Adult,Obstetrics,Pediatric)  Goal: Skin Integrity  Patient will demonstrate the desired outcomes by discharge/transition of care.  Turn Q 2 hr and prn to prevent skin breakdown.

## 2018-09-29 NOTE — PLAN OF CARE
Problem: Infection, Risk/Actual (Adult)  Goal: Infection Prevention/Resolution  Patient will demonstrate the desired outcomes by discharge/transition of care.  Administer antibiotic as ordered

## 2018-09-29 NOTE — PLAN OF CARE
09/29/18 0814   PRE-TX-O2-ETCO2   O2 Device (Oxygen Therapy) nasal cannula   $ Is the patient on Low Flow Oxygen? Yes   Flow (L/min) 1   SpO2 (!) 93 %   $ Pulse Oximetry - Multiple Charge Pulse Oximetry - Multiple

## 2018-09-29 NOTE — PLAN OF CARE
Problem: Patient Care Overview  Goal: Plan of Care Review  Outcome: Ongoing (interventions implemented as appropriate)  Patient found on 1 LPM NC, O2 Sats charted.  Will continue to monitor.

## 2018-09-29 NOTE — PLAN OF CARE
Problem: Patient Care Overview  Goal: Plan of Care Review  Turn Q 2 hours and prn waller intact with ronnell urine noted. Afebrile this shift. No distress noted.

## 2018-09-29 NOTE — PROGRESS NOTES
[facility]  Progress Note    Patient Name: Austyn Pedersen  MRN: [id number]  Patient Class: IP- Inpatient   Admission Date: 9/25/2018  Length of Stay: 3 days  Attending Physician: Elliott Garcia MD  Primary Care Provider: Primary Doctor No        Subjective:     Principal Problem:Renal abscess, right    HPI:  Ms. Austyn Pedersen is a 77 yo woman with dementia, bipolar disorder, HTN who presents with pyelonephritis.  She was taken to [facility] on 9/14/18 for syncope and urine incontinence.  She was hypotensive with blood pressure of 86/48.  Urinalysis was normal.  WBC count was 6,030.  She was given IV fluids.              She returned to [facility] on 9/20/18.  Blood pressure was normal to high this time.  Pulse was 113.  Temperature was 102.3° Fahrenheit.  Urinalysis showed 3+ protein, positive nitrite, >100 RBC/hpf, 50 WBC/hpf, few WBC clumps, and many bacteria.  WBC count was 15,530.  CT showed right nonobstructing kidney stone with pyelonephritis.  She was given ceftriaxone and 2,355 mL of normal saline.  She was admitted to [facility] and put on meropenem. She had encephalopathy with difficulty swallowing.  Her blood and urine cultures grew pansensitive E. Coli.  Sepsis resolved.   Meropenem was changed to ciprofloxacin to complete a 14 day antibiotic course.  She was discharged back to [facility] on 9/24/18.                She again returned to [facility] on 9/25 evening with fever, chest and abdominal pain. She was given nitro and aspirin PTA. BNP was 312, procal was 0.60. WBC was had increased from 10.62 9/24 to 13.02. UA showed 30 RBC and 6 WBC. Chest XR showed small pleural effusions. CT abdomen showed pyelo similar to CT on 9/20 and interval development of right renal abscess. Tmax was 101. SpO2 was 91% and pt was placed on O2 with improvement. She was given zosyn and admitted to  hospital medicine.    Hospital Course:  Pt spiked fever to 101.9 9/27 am. Urology Dr. Sorenson took to OR for stent 9/27. Tolerated well without complication. Remained afebrile. Downgraded from zosyn to ceftriaxone. Remains on supplemental oxygen despite lasix, CXR pending.    Interval History: Reports her back hurts in the middle. Not tender on palpation.     Review of Systems   Respiratory: Negative for shortness of breath.    Cardiovascular: Negative for chest pain.   Gastrointestinal: Negative for abdominal pain.   Musculoskeletal: Positive for back pain.     Objective:     Vital Signs (Most Recent):  Temp: 99.7 °F (37.6 °C) (09/29/18 0756)  Pulse: 103 (09/29/18 0756)  Resp: 18 (09/29/18 0756)  BP: (!) 158/79 (09/29/18 0756)  SpO2: (!) 93 % (09/29/18 0814) Vital Signs (24h Range):  Temp:  [96.6 °F (35.9 °C)-99.7 °F (37.6 °C)] 99.7 °F (37.6 °C)  Pulse:  [] 103  Resp:  [18] 18  SpO2:  [93 %-98 %] 93 %  BP: (128-166)/(59-79) 158/79     Weight: 84 kg (185 lb 3 oz)  Body mass index is 26.57 kg/m².    Intake/Output Summary (Last 24 hours) at 9/29/2018 1010  Last data filed at 9/29/2018 0947  Gross per 24 hour   Intake 311 ml   Output 2750 ml   Net -2439 ml      Physical Exam   Cardiovascular: Normal rate and regular rhythm.   Pulmonary/Chest: Effort normal and breath sounds normal.   NC in place   Abdominal: Soft.   Musculoskeletal: She exhibits no edema or tenderness.   Back is not tender       Significant Labs: All pertinent labs within the past 24 hours have been reviewed.    Significant Imaging: I have reviewed all pertinent imaging results/findings within the past 24 hours.    Assessment/Plan:      * Renal abscess, right    Right pyelonephritis  Right nephrolithiasis  Pt has been readmitted one day after discharge for pyelonephritis. Had previously been on merrem and discharged on cipro. WBC increased to 13.02. Non obstructing stone present. Fever to 101.9 so urology placed stent, has remained afebrile since  then  -Gave zosyn and downgraded to ceftriaxone  -Urology consulted - took to OR 9/27 for stent  -Monitor labs - remaining stable  -Place waller to improve urine outflow        Essential hypertension    -166.   -Continue home lisinopril and propanolol  -Monitor  -Clonidine PRN        Bipolar affective disorder    Dementia without behavioral disturbance  Pt not fully oriented and somewhat upset consistently on exam.  -Continue home depakote and risperidone  -Continue home namenda  -Delirium precautions        Chronic diastolic (congestive) heart failure    Echo showing grade I diastolic dysfunction. EF 65%. .   -Lasix 40 mg BID  -Wean O2        Nursing home resident    Wheelchair dependent  Return to [facility] on d/c.          VTE Risk Mitigation (From admission, onward)        Ordered     enoxaparin injection 40 mg  Daily      09/26/18 0358     IP VTE LOW RISK PATIENT  Once      09/26/18 0132              Piper Barraza PASaidaC  Department of Hospital Medicine   [facility]

## 2018-09-29 NOTE — PLAN OF CARE
Problem: Patient Care Overview  Goal: Plan of Care Review  Outcome: Ongoing (interventions implemented as appropriate)  POC reviewed with patient. Patient alert to self and place and reports lower back pain. PRN pain medication administered. IV antibiotics administered. Pena in place draining ronnell urine. 1L of oxygen per nasal cannula in place. Bed alarm on, bed locked and low, side rails up x2, and call bell in reach. Patient verbalizes clear understanding of POC.

## 2018-09-29 NOTE — SUBJECTIVE & OBJECTIVE
Interval History: Reports her back hurts in the middle. Not tender on palpation.     Review of Systems   Respiratory: Negative for shortness of breath.    Cardiovascular: Negative for chest pain.   Gastrointestinal: Negative for abdominal pain.   Musculoskeletal: Positive for back pain.     Objective:     Vital Signs (Most Recent):  Temp: 99.7 °F (37.6 °C) (09/29/18 0756)  Pulse: 103 (09/29/18 0756)  Resp: 18 (09/29/18 0756)  BP: (!) 158/79 (09/29/18 0756)  SpO2: (!) 93 % (09/29/18 0814) Vital Signs (24h Range):  Temp:  [96.6 °F (35.9 °C)-99.7 °F (37.6 °C)] 99.7 °F (37.6 °C)  Pulse:  [] 103  Resp:  [18] 18  SpO2:  [93 %-98 %] 93 %  BP: (128-166)/(59-79) 158/79     Weight: 84 kg (185 lb 3 oz)  Body mass index is 26.57 kg/m².    Intake/Output Summary (Last 24 hours) at 9/29/2018 1010  Last data filed at 9/29/2018 0947  Gross per 24 hour   Intake 311 ml   Output 2750 ml   Net -2439 ml      Physical Exam   Cardiovascular: Normal rate and regular rhythm.   Pulmonary/Chest: Effort normal and breath sounds normal.   NC in place   Abdominal: Soft.   Musculoskeletal: She exhibits no edema or tenderness.   Back is not tender       Significant Labs: All pertinent labs within the past 24 hours have been reviewed.    Significant Imaging: I have reviewed all pertinent imaging results/findings within the past 24 hours.

## 2018-09-30 VITALS
WEIGHT: 185.19 LBS | TEMPERATURE: 98 F | OXYGEN SATURATION: 94 % | RESPIRATION RATE: 14 BRPM | HEART RATE: 75 BPM | SYSTOLIC BLOOD PRESSURE: 164 MMHG | DIASTOLIC BLOOD PRESSURE: 79 MMHG | HEIGHT: 70 IN | BODY MASS INDEX: 26.51 KG/M2

## 2018-09-30 PROBLEM — I50.33 ACUTE ON CHRONIC DIASTOLIC (CONGESTIVE) HEART FAILURE: Status: ACTIVE | Noted: 2018-09-26

## 2018-09-30 PROBLEM — B96.20 E. COLI PYELONEPHRITIS: Status: RESOLVED | Noted: 2018-09-21 | Resolved: 2018-09-30

## 2018-09-30 PROBLEM — I50.33 ACUTE ON CHRONIC DIASTOLIC (CONGESTIVE) HEART FAILURE: Status: RESOLVED | Noted: 2018-09-26 | Resolved: 2018-09-30

## 2018-09-30 PROBLEM — N12 E. COLI PYELONEPHRITIS: Status: RESOLVED | Noted: 2018-09-21 | Resolved: 2018-09-30

## 2018-09-30 PROBLEM — N15.1 RENAL ABSCESS, RIGHT: Status: RESOLVED | Noted: 2018-09-26 | Resolved: 2018-09-30

## 2018-09-30 LAB
ANION GAP SERPL CALC-SCNC: 11 MMOL/L
BASOPHILS # BLD AUTO: 0.02 K/UL
BASOPHILS NFR BLD: 0.2 %
BUN SERPL-MCNC: 19 MG/DL
CALCIUM SERPL-MCNC: 9.7 MG/DL
CHLORIDE SERPL-SCNC: 100 MMOL/L
CO2 SERPL-SCNC: 32 MMOL/L
CREAT SERPL-MCNC: 0.8 MG/DL
DIFFERENTIAL METHOD: ABNORMAL
EOSINOPHIL # BLD AUTO: 0.3 K/UL
EOSINOPHIL NFR BLD: 3 %
ERYTHROCYTE [DISTWIDTH] IN BLOOD BY AUTOMATED COUNT: 14 %
EST. GFR  (AFRICAN AMERICAN): >60 ML/MIN/1.73 M^2
EST. GFR  (NON AFRICAN AMERICAN): >60 ML/MIN/1.73 M^2
GLUCOSE SERPL-MCNC: 93 MG/DL
HCT VFR BLD AUTO: 32.3 %
HGB BLD-MCNC: 9.9 G/DL
LYMPHOCYTES # BLD AUTO: 1.7 K/UL
LYMPHOCYTES NFR BLD: 16.9 %
MCH RBC QN AUTO: 29 PG
MCHC RBC AUTO-ENTMCNC: 30.7 G/DL
MCV RBC AUTO: 95 FL
MONOCYTES # BLD AUTO: 0.8 K/UL
MONOCYTES NFR BLD: 7.4 %
NEUTROPHILS # BLD AUTO: 7.2 K/UL
NEUTROPHILS NFR BLD: 71.3 %
PLATELET # BLD AUTO: 323 K/UL
PMV BLD AUTO: 10 FL
POCT GLUCOSE: 92 MG/DL (ref 70–110)
POCT GLUCOSE: 96 MG/DL (ref 70–110)
POTASSIUM SERPL-SCNC: 4 MMOL/L
RBC # BLD AUTO: 3.41 M/UL
SODIUM SERPL-SCNC: 143 MMOL/L
WBC # BLD AUTO: 10.13 K/UL

## 2018-09-30 PROCEDURE — 36415 COLL VENOUS BLD VENIPUNCTURE: CPT

## 2018-09-30 PROCEDURE — 85025 COMPLETE CBC W/AUTO DIFF WBC: CPT

## 2018-09-30 PROCEDURE — 25000003 PHARM REV CODE 250: Performed by: HOSPITALIST

## 2018-09-30 PROCEDURE — 94761 N-INVAS EAR/PLS OXIMETRY MLT: CPT

## 2018-09-30 PROCEDURE — 63600175 PHARM REV CODE 636 W HCPCS: Performed by: HOSPITALIST

## 2018-09-30 PROCEDURE — 25000003 PHARM REV CODE 250: Performed by: ANESTHESIOLOGY

## 2018-09-30 PROCEDURE — A4216 STERILE WATER/SALINE, 10 ML: HCPCS | Performed by: ANESTHESIOLOGY

## 2018-09-30 PROCEDURE — 80048 BASIC METABOLIC PNL TOTAL CA: CPT

## 2018-09-30 PROCEDURE — 63600175 PHARM REV CODE 636 W HCPCS: Performed by: PHYSICIAN ASSISTANT

## 2018-09-30 PROCEDURE — 25000003 PHARM REV CODE 250: Performed by: EMERGENCY MEDICINE

## 2018-09-30 RX ORDER — CEFPODOXIME PROXETIL 100 MG/1
100 TABLET, FILM COATED ORAL EVERY 12 HOURS
Qty: 20 TABLET | Refills: 0
Start: 2018-09-30 | End: 2018-10-10

## 2018-09-30 RX ORDER — ALPRAZOLAM 0.25 MG/1
0.25 TABLET ORAL ONCE
Status: COMPLETED | OUTPATIENT
Start: 2018-09-30 | End: 2018-09-30

## 2018-09-30 RX ADMIN — ENOXAPARIN SODIUM 40 MG: 100 INJECTION SUBCUTANEOUS at 04:09

## 2018-09-30 RX ADMIN — MEMANTINE HYDROCHLORIDE 5 MG: 5 TABLET ORAL at 08:09

## 2018-09-30 RX ADMIN — CALCIUM CARBONATE (ANTACID) CHEW TAB 500 MG 1500 MG: 500 CHEW TAB at 08:09

## 2018-09-30 RX ADMIN — FUROSEMIDE 40 MG: 10 INJECTION, SOLUTION INTRAMUSCULAR; INTRAVENOUS at 05:09

## 2018-09-30 RX ADMIN — CEFTRIAXONE SODIUM 2 G: 2 INJECTION, POWDER, FOR SOLUTION INTRAMUSCULAR; INTRAVENOUS at 08:09

## 2018-09-30 RX ADMIN — DIVALPROEX SODIUM 250 MG: 250 TABLET, DELAYED RELEASE ORAL at 08:09

## 2018-09-30 RX ADMIN — FLUTICASONE PROPIONATE 50 MCG: 50 SPRAY, METERED NASAL at 08:09

## 2018-09-30 RX ADMIN — ALPRAZOLAM 0.25 MG: 0.25 TABLET ORAL at 04:09

## 2018-09-30 RX ADMIN — ASPIRIN 81 MG: 81 TABLET, COATED ORAL at 08:09

## 2018-09-30 RX ADMIN — LISINOPRIL 20 MG: 20 TABLET ORAL at 08:09

## 2018-09-30 RX ADMIN — VITAMIN B12 0.1 MG ORAL TABLET 100 MCG: 0.1 TABLET ORAL at 08:09

## 2018-09-30 RX ADMIN — RISPERIDONE 1 MG: 0.25 TABLET, FILM COATED ORAL at 08:09

## 2018-09-30 RX ADMIN — Medication 3 ML: at 02:09

## 2018-09-30 RX ADMIN — PROPRANOLOL HYDROCHLORIDE 60 MG: 60 CAPSULE, EXTENDED RELEASE ORAL at 08:09

## 2018-09-30 RX ADMIN — FUROSEMIDE 40 MG: 10 INJECTION, SOLUTION INTRAMUSCULAR; INTRAVENOUS at 08:09

## 2018-09-30 RX ADMIN — Medication 3 ML: at 07:09

## 2018-09-30 NOTE — PROGRESS NOTES
TN contacted charge nurse, [place] at pt's [facility], [facility], [phone], fax [phone], regarding pt returning today.     Brooklyn will call TN back once she has reviewed the NH orders

## 2018-09-30 NOTE — ASSESSMENT & PLAN NOTE
Right pyelonephritis  Right nephrolithiasis  Pt readmitted one day after discharge for pyelonephritis. Had previously been on merrem and discharged on cipro. WBC increased to 13.02. Non obstructing stone present. Fever to 101.9 so urology placed stent, has remained afebrile since then  -Gave zosyn and downgraded to ceftriaxone  -Urology consulted - took to OR 9/27 for stent  -Monitor labs - remaining stable  -Placed waller to improve urine outflow - voiding trial today

## 2018-09-30 NOTE — NURSING
Wheelchair transport at bedside. Pt transferred to wheelchair with two person assist. Pt asymptomatic. Remains on RA. PIV removed. No belongings at bedside.

## 2018-09-30 NOTE — NURSING
[facility] transport called and notified that transport is running behind and will not be able to  pt until 6-7pm.

## 2018-09-30 NOTE — PLAN OF CARE
Problem: Fall Risk (Adult)  Goal: Absence of Falls  Patient will demonstrate the desired outcomes by discharge/transition of care.  Bed alarm on and bed in lowest position. Instructed to  Call for assistance before attempting to get out of bed. Verbalized understanding.

## 2018-09-30 NOTE — PROGRESS NOTES
[facility] Hospital Medicine  Progress Note    Patient Name: Austyn Pedersen  MRN: [id number]  Patient Class: IP- Inpatient   Admission Date: 9/25/2018  Length of Stay: 4 days  Attending Physician: Elliott Garcia MD  Primary Care Provider: Primary Doctor No        Subjective:     Principal Problem:Renal abscess, right    HPI:  Ms. Austyn Pedersen is a 77 yo woman with dementia, bipolar disorder, HTN who presents with pyelonephritis.  She was taken to [facility] on 9/14/18 for syncope and urine incontinence.  She was hypotensive with blood pressure of 86/48.  Urinalysis was normal.  WBC count was 6,030.  She was given IV fluids.              She returned to [facility] on 9/20/18.  Blood pressure was normal to high this time.  Pulse was 113.  Temperature was 102.3° Fahrenheit.  Urinalysis showed 3+ protein, positive nitrite, >100 RBC/hpf, 50 WBC/hpf, few WBC clumps, and many bacteria.  WBC count was 15,530.  CT showed right nonobstructing kidney stone with pyelonephritis.  She was given ceftriaxone and 2,355 mL of normal saline.  She was admitted to [facility] and put on meropenem. She had encephalopathy with difficulty swallowing.  Her blood and urine cultures grew pansensitive E. Coli.  Sepsis resolved.   Meropenem was changed to ciprofloxacin to complete a 14 day antibiotic course.  She was discharged back to [facility] on 9/24/18.                She again returned to [facility] on 9/25 evening with fever, chest and abdominal pain. She was given nitro and aspirin PTA. BNP was 312, procal was 0.60. WBC was had increased from 10.62 9/24 to 13.02. UA showed 30 RBC and 6 WBC. Chest XR showed small pleural effusions. CT abdomen showed pyelo similar to CT on 9/20 and interval development of right renal abscess. Tmax was 101. SpO2 was 91% and pt was placed on O2 with improvement. She was given zosyn and admitted to  hospital medicine.    Hospital Course:  Pt spiked fever to 101.9 9/27 am. Urology Dr. Sorenson took to OR for stent 9/27. Tolerated well without complication. Remained afebrile. Downgraded from zosyn to ceftriaxone. Remained on supplemental oxygen despite lasix, CXR shows no acute issue. Weaned to room air. Voiding trial initiated, no void yet. Can likely d/c once voiding spontaneously.    Interval History: Pt resting in bed, hostile, unwilling to speak with me. Wants to rest. Appears more comfortable on examination.    Review of Systems   Unable to perform ROS: Dementia (Pt refuses)     Objective:     Vital Signs (Most Recent):  Temp: 97.4 °F (36.3 °C) (09/30/18 0718)  Pulse: 77 (09/30/18 0718)  Resp: 17 (09/30/18 0718)  BP: (!) 154/79 (09/30/18 0718)  SpO2: 95 % (09/30/18 0810) Vital Signs (24h Range):  Temp:  [97.2 °F (36.2 °C)-99.1 °F (37.3 °C)] 97.4 °F (36.3 °C)  Pulse:  [63-81] 77  Resp:  [12-18] 17  SpO2:  [93 %-99 %] 95 %  BP: (140-178)/(63-84) 154/79     Weight: 84 kg (185 lb 3 oz)  Body mass index is 26.57 kg/m².    Intake/Output Summary (Last 24 hours) at 9/30/2018 0940  Last data filed at 9/30/2018 0858  Gross per 24 hour   Intake 56 ml   Output 1204 ml   Net -1148 ml      Physical Exam   Cardiovascular: Normal rate and regular rhythm.   Pulmonary/Chest: Effort normal and breath sounds normal.   Abdominal: Soft. There is no tenderness.       Significant Labs:   BMP:   Recent Labs   Lab  09/30/18   0505   GLU  93   NA  143   K  4.0   CL  100   CO2  32*   BUN  19   CREATININE  0.8   CALCIUM  9.7     CBC:   Recent Labs   Lab  09/29/18   0644  09/30/18   0505   WBC  11.06  10.13   HGB  10.5*  9.9*   HCT  33.4*  32.3*   PLT  352*  323       Significant Imaging: I have reviewed all pertinent imaging results/findings within the past 24 hours.    Assessment/Plan:      * Renal abscess, right    Right pyelonephritis  Right nephrolithiasis  Pt readmitted one day after discharge for pyelonephritis. Had previously been  on merrem and discharged on cipro. WBC increased to 13.02. Non obstructing stone present. Fever to 101.9 so urology placed stent, has remained afebrile since then  -Gave zosyn and downgraded to ceftriaxone  -Urology consulted - took to OR 9/27 for stent  -Monitor labs - remaining stable  -Placed waller to improve urine outflow - voiding trial today        Essential hypertension    -178.   -Continue home lisinopril and propanolol  -Monitor  -Clonidine PRN        Bipolar affective disorder    Dementia without behavioral disturbance  Pt not fully oriented and somewhat upset consistently on exam.  -Continue home depakote and risperidone  -Continue home namenda  -Delirium precautions        Acute on chronic diastolic (congestive) heart failure    Echo showing grade I diastolic dysfunction. EF 65%. .   -Lasix 40 mg BID  -Weaned O2        Nursing home resident    Wheelchair dependent  Return to [facility] on d/c.          VTE Risk Mitigation (From admission, onward)        Ordered     enoxaparin injection 40 mg  Daily      09/26/18 0358     IP VTE LOW RISK PATIENT  Once      09/26/18 0132              Piper Barraza PA-C  Department of Hospital Medicine   [facility]

## 2018-09-30 NOTE — NURSING
Pena catheter removed for voiding trial. Pt tolerated well. Will continue to monitor for urine output.       @1030: Pt able to void spontaneously. Incontinence care provided. JAIDEN Saldaña notified.

## 2018-09-30 NOTE — NURSING
Pt is being discharged. Discharge teaching was reviewed with patient and caregiver, Liya, over the phone. Verbalized understanding. Refer to clinical references for pt education. IV will remain in place until transport arrives. No orders for telemetry. Report called and given to TRICIA Samuels at [facility]. Wheelchair transport set up for 4pm. Awaiting transport.

## 2018-09-30 NOTE — ASSESSMENT & PLAN NOTE
Right pyelonephritis  Right nephrolithiasis  Pt readmitted one day after discharge for pyelonephritis. Had previously been on merrem and discharged on cipro. WBC increased to 13.02. Non obstructing stone present. Fever to 101.9 so urology placed stent, has remained afebrile since then  -Gave zosyn and downgraded to ceftriaxone. D/c on cefpodoxime  -Urology consulted - took to OR 9/27 for stent  -Monitor labs - remained stable  -Placed waller to improve urine outflow - voiding trial successful

## 2018-09-30 NOTE — SUBJECTIVE & OBJECTIVE
Interval History: Pt resting in bed, hostile, unwilling to speak with me. Wants to rest. Appears more comfortable on examination.    Review of Systems   Unable to perform ROS: Dementia (Pt refuses)     Objective:     Vital Signs (Most Recent):  Temp: 97.4 °F (36.3 °C) (09/30/18 0718)  Pulse: 77 (09/30/18 0718)  Resp: 17 (09/30/18 0718)  BP: (!) 154/79 (09/30/18 0718)  SpO2: 95 % (09/30/18 0810) Vital Signs (24h Range):  Temp:  [97.2 °F (36.2 °C)-99.1 °F (37.3 °C)] 97.4 °F (36.3 °C)  Pulse:  [63-81] 77  Resp:  [12-18] 17  SpO2:  [93 %-99 %] 95 %  BP: (140-178)/(63-84) 154/79     Weight: 84 kg (185 lb 3 oz)  Body mass index is 26.57 kg/m².    Intake/Output Summary (Last 24 hours) at 9/30/2018 0940  Last data filed at 9/30/2018 0858  Gross per 24 hour   Intake 56 ml   Output 1204 ml   Net -1148 ml      Physical Exam   Cardiovascular: Normal rate and regular rhythm.   Pulmonary/Chest: Effort normal and breath sounds normal.   Abdominal: Soft. There is no tenderness.       Significant Labs:   BMP:   Recent Labs   Lab  09/30/18   0505   GLU  93   NA  143   K  4.0   CL  100   CO2  32*   BUN  19   CREATININE  0.8   CALCIUM  9.7     CBC:   Recent Labs   Lab  09/29/18   0644  09/30/18   0505   WBC  11.06  10.13   HGB  10.5*  9.9*   HCT  33.4*  32.3*   PLT  352*  323       Significant Imaging: I have reviewed all pertinent imaging results/findings within the past 24 hours.

## 2018-09-30 NOTE — PLAN OF CARE
[facility]     Department of Hospital Medicine     [address] [place] [address]     [phone] after hours  [phone] fax       NURSING HOME ORDERS    09/30/2018    Admit to Nursing Home:  Regular Bed       Diagnoses:  Active Hospital Problems    Diagnosis  POA    *Renal abscess, right [N15.1]  Yes     Priority: 1 - High    Essential hypertension [I10]  Yes     Priority: 2      Chronic    Bipolar affective disorder [F31.9]  Yes     Priority: 3      Chronic    Acute on chronic diastolic (congestive) heart failure [I50.33]  Yes     Priority: 4     Nursing home resident [Z59.3]  Not Applicable     Priority: 5      Chronic     [facility] ([address] [place] [address])      Dementia without behavioral disturbance [F03.90]  Yes     Chronic    Right nephrolithiasis [N20.0]  Yes    E. coli pyelonephritis [N12, B96.20]  Yes    Wheelchair dependent [Z99.3]  Not Applicable     Chronic      Resolved Hospital Problems   No resolved problems to display.       Patient is homebound due to:  Renal abscess, right    Allergies:Review of patient's allergies indicates:  No Known Allergies    Vitals:      Routine    Diet: Cardiac    Activities:     - As tolerated    LABS:  Per facility protocol    Nursing Precautions: N/A    Medications: Discontinue all previous medication orders, if any. See new list below.     Austyn Pedersen   Home Medication Instructions NICKY:[id number]    Printed on:09/30/18 1115   Medication Information                      aspirin (ECOTRIN) 81 MG EC tablet  Take 81 mg by mouth once daily.             calcium carbonate/vitamin D3 (CALCIUM 600 + D,3, ORAL)  Take by mouth once daily.             carteolol (OCUPRESS) 1 % ophthalmic solution  Place 1 drop into the right eye 2 (two) times daily.             cefpodoxime (VANTIN) 100 MG tablet  Take 1 tablet (100 mg total) by mouth every 12 (twelve) hours. for 10 days              cetirizine (ZYRTEC) 10 MG tablet  Take 10 mg by mouth every evening.             cranberry fruit concentrate (AZO CRANBERRY ORAL)  Take by mouth 3 (three) times daily.             cyanocobalamin (VITAMIN B-12) 100 MCG tablet  Take 100 mcg by mouth once daily.             divalproex (DEPAKOTE) 250 MG EC tablet  Take 250 mg by mouth once daily.             divalproex (DEPAKOTE) 500 MG TbEC  Take 500 mg by mouth every evening.             docusate sodium (COLACE) 100 MG capsule  Take 100 mg by mouth daily as needed for Constipation.             estradiol (ESTRACE) 0.01 % (0.1 mg/gram) vaginal cream  Place 1 g vaginally twice a week. Monday and Thursday             fluticasone (FLONASE) 50 mcg/actuation nasal spray  1 spray by Each Nare route once daily.             HYDROcodone-acetaminophen (NORCO) 5-325 mg per tablet  Take 1 tablet by mouth 3 (three) times daily as needed for Pain.             latanoprost 0.005 % ophthalmic solution  Place 1 drop into the right eye every evening.             lisinopril (PRINIVIL,ZESTRIL) 20 MG tablet  Take 20 mg by mouth once daily.             melatonin 3 mg Tab  Take by mouth every evening.             memantine (NAMENDA) 5 MG Tab  Take 5 mg by mouth once daily.             methylcellulose oral powder  Take by mouth once daily. 30cc In orange juice daily             multivitamin (ONE DAILY MULTIVITAMIN) per tablet  Take 1 tablet by mouth once daily.             propranolol (INNOPRAN XL) 80 mg 24 hr capsule  Take 80 mg by mouth once daily.             risperiDONE (RISPERDAL) 1 MG tablet  Take 1 mg by mouth 2 (two) times daily.             tiZANidine (ZANAFLEX) 2 MG tablet  Take 4 mg by mouth 3 (three) times daily.             traZODone (DESYREL) 50 MG tablet  Take 25 mg by mouth every evening.                       _________________________________  Piper Barraza PA-C  09/30/2018

## 2018-09-30 NOTE — PLAN OF CARE
TN sent NH Orders to Charge nurse, [place]. Floor nurse, Mariel to call report to [facility], [phone], transfer packet given to floor nurse.    Dixie Carlisle, [phone], to provide transportation back to facility,  time scheduled for 4:00 pm    TN contacted pt's friend/caregiver Liya Francisco, [phone], to inform her of pt's transfer back to facility    Pt's nurse will go over medications/signs and symptoms prior to discharge       09/30/18 1444   Final Note   Assessment Type Final Discharge Note   Discharge Disposition senior living Nu  (resident returning to senior living NH, [facility])   What phone number can be called within the next 1-3 days to see how you are doing after discharge? [phone]   Hospital Follow Up  Appt(s) scheduled? No  (IM sent to Dr Sorenson's office)   Right Care Referral Info   Post Acute Recommendation Other  (return to long term NH)     Dejah Murillo, RN Transitional Navigator  [phone]

## 2018-09-30 NOTE — DISCHARGE INSTRUCTIONS
Abscess, Antibiotic Treatment Only (English) View Edit Remove  Cefpodoxime tablets (English) View Edit Remove  Cystoscopy (English) View Edit Remove

## 2018-09-30 NOTE — NURSING
Pt called nurse to bedside, stating she is anxious about leaving. JAIDEN Saldaña notified, 0.25mg xanax ordered and administered. RN at [facility] notified.

## 2018-09-30 NOTE — PLAN OF CARE
Problem: Patient Care Overview  Goal: Plan of Care Review  Turned and repositioned Q 2 hrs and PRN during shift. Pena intact draining yellow urine. No c/o pain at present. Will continue to monitor.

## 2018-09-30 NOTE — DISCHARGE SUMMARY
[facility]  Discharge Summary      Patient Name: Austyn Pedersen  MRN: [id number]  Admission Date: 9/25/2018  Hospital Length of Stay: 4 days  Discharge Date and Time: No discharge date for patient encounter.  Attending Physician: Elliott Garcia MD   Discharging Provider: Piper Barraza PA-C  Primary Care Provider: Primary Doctor No      HPI:   Ms. Austyn Pedersen is a 77 yo woman with dementia, bipolar disorder, HTN who presents with pyelonephritis.  She was taken to [facility] on 9/14/18 for syncope and urine incontinence.  She was hypotensive with blood pressure of 86/48.  Urinalysis was normal.  WBC count was 6,030.  She was given IV fluids.              She returned to [facility] on 9/20/18.  Blood pressure was normal to high this time.  Pulse was 113.  Temperature was 102.3° Fahrenheit.  Urinalysis showed 3+ protein, positive nitrite, >100 RBC/hpf, 50 WBC/hpf, few WBC clumps, and many bacteria.  WBC count was 15,530.  CT showed right nonobstructing kidney stone with pyelonephritis.  She was given ceftriaxone and 2,355 mL of normal saline.  She was admitted to [facility] and put on meropenem. She had encephalopathy with difficulty swallowing.  Her blood and urine cultures grew pansensitive E. Coli.  Sepsis resolved.   Meropenem was changed to ciprofloxacin to complete a 14 day antibiotic course.  She was discharged back to [facility] on 9/24/18.                She again returned to [facility] on 9/25 evening with fever, chest and abdominal pain. She was given nitro and aspirin PTA. BNP was 312, procal was 0.60. WBC was had increased from 10.62 9/24 to 13.02. UA showed 30 RBC and 6 WBC. Chest XR showed small pleural effusions. CT abdomen showed pyelo similar to CT on 9/20 and interval development of right renal abscess. Tmax was 101. SpO2 was 91% and pt was placed on O2 with improvement. She  was given zosyn and admitted to hospital medicine.    Procedure(s) (LRB):  CYSTOSCOPY, WITH URETERAL STENT INSERTION, possible retrograde pyelogram (Right)      Hospital Course:   Pt spiked fever to 101.9 9/27 am. Urology Dr. Sorenson took to OR for stent 9/27. Tolerated well without complication. Remained afebrile. Downgraded from zosyn to ceftriaxone. Remained on supplemental oxygen despite lasix, CXR shows no acute issue. Weaned to room air. Voiding trial initiated, voided 200 cc spontaneously. D/Todd to [facility] with cefpodoxime and urology follow up.     Consults:   Consults (From admission, onward)        Status Ordering Provider     Inpatient consult to Urology  Once     Provider:  Laura Sorenson MD    Completed GREER REYES.          * Renal abscess, right    Right pyelonephritis  Right nephrolithiasis  Pt readmitted one day after discharge for pyelonephritis. Had previously been on merrem and discharged on cipro. WBC increased to 13.02. Non obstructing stone present. Fever to 101.9 so urology placed stent, has remained afebrile since then  -Gave zosyn and downgraded to ceftriaxone. D/c on cefpodoxime  -Urology consulted - took to OR 9/27 for stent  -Monitor labs - remained stable  -Placed waller to improve urine outflow - voiding trial successful        Essential hypertension    -178.   -Continue home lisinopril and propanolol  -Monitor  -Clonidine PRN        Bipolar affective disorder    Dementia without behavioral disturbance  Pt not fully oriented and somewhat upset consistently on exam.  -Continue home depakote and risperidone  -Continue home namenda  -Delirium precautions        Acute on chronic diastolic (congestive) heart failure    Echo showing grade I diastolic dysfunction. EF 65%. .   -Lasix 40 mg BID  -Weaned O2        Nursing home resident    Wheelchair dependent  Return to [facility] on d/c.          Final Active Diagnoses:    Diagnosis Date Noted POA    PRINCIPAL PROBLEM:  Renal  abscess, right [N15.1] 09/26/2018 Yes    Essential hypertension [I10] 09/21/2018 Yes     Chronic    Bipolar affective disorder [F31.9] 09/21/2018 Yes     Chronic    Acute on chronic diastolic (congestive) heart failure [I50.33] 09/26/2018 Yes    Nursing home resident [Z59.3] 09/21/2018 Not Applicable     Chronic    Dementia without behavioral disturbance [F03.90] 09/21/2018 Yes     Chronic    Right nephrolithiasis [N20.0] 09/21/2018 Yes    E. coli pyelonephritis [N12, B96.20] 09/21/2018 Yes    Wheelchair dependent [Z99.3] 09/21/2018 Not Applicable     Chronic      Problems Resolved During this Admission:       Discharged Condition: stable    Disposition: Long Term Care    Follow Up:    Patient Instructions:      Ambulatory Referral to Urology   Referral Priority: Routine Referral Type: Consultation   Referral Reason: Specialty Services Required   Referred to Provider: SCAR MAJOR Requested Specialty: Urology   Number of Visits Requested: 1     Diet Cardiac     Vital signs per facility protocol     Intake and output per facility protocol     Skin assessment every shift      Notify Physician     Order Specific Question Answer Comments   Temperature (F) greater than 101    Systolic Blood Pressure SBP greater than or equal to 160    Systolic Blood Pressure SBP less than or equal to 90    Diastolic Blood Pressure DBP greater than or equal to 110    Diastolic Blood Pressure DBP less than or equal to 60    Pulse greater than or equal to 120    Pulse less than or equal to 50    Respirations Rate RR greater than or equal to 30    Respirations Rate RR less than or equal to 6    Urine output less than 60 over 2 hours   SPO2% less than 90      Full code     Pulse Oximetry       Significant Diagnostic Studies: Labs:   BMP:   Recent Labs   Lab  09/29/18   0644  09/30/18   0505   GLU  100  93   NA  142  143   K  3.9  4.0   CL  100  100   CO2  32*  32*   BUN  17  19   CREATININE  0.8  0.8   CALCIUM  9.9  9.7     Cardiac  Graphics: Echocardiogram:   2D echo with color flow doppler:   Results for orders placed or performed during the hospital encounter of 09/25/18   2D echo with color flow doppler   Result Value Ref Range    EF 65 55 - 65    Mitral Valve Regurgitation TRIVIAL     Diastolic Dysfunction Yes (A)     Mitral Valve Mobility NORMAL        Pending Diagnostic Studies:     Procedure Component Value Units Date/Time    SURG FL Surgery Retrograde Pyelogram [[id number]] Resulted:  09/27/18 1231    Order Status:  Sent Lab Status:  In process Updated:  09/27/18 1232         Medications:  Reconciled Home Medications:      Medication List      START taking these medications    cefpodoxime 100 MG tablet  Commonly known as:  VANTIN  Take 1 tablet (100 mg total) by mouth every 12 (twelve) hours. for 10 days        CONTINUE taking these medications    aspirin 81 MG EC tablet  Commonly known as:  ECOTRIN  Take 81 mg by mouth once daily.     AZO CRANBERRY ORAL  Take by mouth 3 (three) times daily.     CALCIUM 600 + D(3) ORAL  Take by mouth once daily.     carteolol 1 % ophthalmic solution  Commonly known as:  OCUPRESS  Place 1 drop into the right eye 2 (two) times daily.     cetirizine 10 MG tablet  Commonly known as:  ZYRTEC  Take 10 mg by mouth every evening.     * divalproex 500 MG Tbec  Commonly known as:  DEPAKOTE  Take 500 mg by mouth every evening.     * divalproex 250 MG EC tablet  Commonly known as:  DEPAKOTE  Take 250 mg by mouth once daily.     docusate sodium 100 MG capsule  Commonly known as:  COLACE  Take 100 mg by mouth daily as needed for Constipation.     estradiol 0.01 % (0.1 mg/gram) vaginal cream  Commonly known as:  ESTRACE  Place 1 g vaginally twice a week. Monday and Thursday     fluticasone 50 mcg/actuation nasal spray  Commonly known as:  FLONASE  1 spray by Each Nare route once daily.     HYDROcodone-acetaminophen 5-325 mg per tablet  Commonly known as:  NORCO  Take 1 tablet by mouth 3 (three) times daily as needed  for Pain.     latanoprost 0.005 % ophthalmic solution  Place 1 drop into the right eye every evening.     lisinopril 20 MG tablet  Commonly known as:  PRINIVIL,ZESTRIL  Take 20 mg by mouth once daily.     melatonin 3 mg Tab  Take by mouth every evening.     memantine 5 MG Tab  Commonly known as:  NAMENDA  Take 5 mg by mouth once daily.     methylcellulose oral powder  Take by mouth once daily. 30cc In orange juice daily     ONE DAILY MULTIVITAMIN per tablet  Generic drug:  multivitamin  Take 1 tablet by mouth once daily.     propranolol 80 mg 24 hr capsule  Commonly known as:  INNOPRAN XL  Take 80 mg by mouth once daily.     risperiDONE 1 MG tablet  Commonly known as:  RISPERDAL  Take 1 mg by mouth 2 (two) times daily.     tiZANidine 2 MG tablet  Commonly known as:  ZANAFLEX  Take 4 mg by mouth 3 (three) times daily.     traZODone 50 MG tablet  Commonly known as:  DESYREL  Take 25 mg by mouth every evening.     VITAMIN B-12 100 MCG tablet  Generic drug:  cyanocobalamin  Take 100 mcg by mouth once daily.         * This list has 2 medication(s) that are the same as other medications prescribed for you. Read the directions carefully, and ask your doctor or other care provider to review them with you.            STOP taking these medications    ciprofloxacin HCl 500 MG tablet  Commonly known as:  CIPRO     MYRBETRIQ 50 mg Tb24  Generic drug:  mirabegron            Indwelling Lines/Drains at time of discharge:   Lines/Drains/Airways     Airway                 Airway - Non-Surgical 09/27/18 1158 Mask 3 days                Time spent on the discharge of patient: 45 minutes  Patient was seen and examined on the date of discharge and determined to be suitable for discharge.         Piper Barraza PA-C  Department of Hospital Medicine  [facility]

## 2018-09-30 NOTE — PROGRESS NOTES
Sent message to Dr. Sorenson's office per Dr. Sorenson's note for 3 week followup for stent removal. Informed office to contact [facility] for appointment.    Christina Tamayo, RN, CCM, CMSRN  RN Transition Navigator  [phone]

## 2018-10-01 ENCOUNTER — TELEPHONE (OUTPATIENT)
Dept: UROLOGY | Facility: CLINIC | Age: 78
End: 2018-10-01

## 2018-10-01 DIAGNOSIS — N12 PYELONEPHRITIS: Primary | ICD-10-CM

## 2018-10-01 LAB — BACTERIA BLD CULT: NORMAL

## 2018-10-01 NOTE — TELEPHONE ENCOUNTER
----- Message from Christina Tamayo RN sent at 9/30/2018  1:57 PM CDT -----  Patient being discharged back to her Nursing home ([phone])- Please schedule hospital discharge followup - per Dr. Sorenson's notes as below    Plan   1. Since the patient does not appear to be improving on IV antibiotics alone, I offered to perform a cystoscopy, right ureteral stent placement under MAC anesthesia on 9/27/18. She tolerated the procedure well. No purulence was identified after the stent was placed.  2. Can remove waller tomorrow morning if patient remains afebrile.  3. Will have patient see me in clinic in about 3 weeks for a symptoms followup. Will order labs and a CT scan. If labs and CT demonstrate favorable findings, will perform cystoscopy, stent removal in about 1-2 weeks after CT.    Thanks,  Christina Tamayo, RN, [facility], CMSRN  RN Transition Navigator  [phone]

## 2018-10-05 NOTE — TELEPHONE ENCOUNTER
Contacted Alfonso, appointments scheduled with [facility].  Mailed out appointment date/time/instructions to Alfonso.

## 2018-10-05 NOTE — TELEPHONE ENCOUNTER
Dr Sorenson, will you be ordering CT/labs prior to visit with you?  Will visit include cystoscopy?

## 2018-10-17 ENCOUNTER — HOSPITAL ENCOUNTER (OUTPATIENT)
Dept: RADIOLOGY | Facility: HOSPITAL | Age: 78
Discharge: HOME | DRG: 312 | End: 2018-10-17
Attending: STUDENT IN AN ORGANIZED HEALTH CARE EDUCATION/TRAINING PROGRAM
Payer: MEDICARE

## 2018-10-17 DIAGNOSIS — N12 PYELONEPHRITIS: ICD-10-CM

## 2018-10-17 PROCEDURE — 74178 CT ABD&PLV WO CNTR FLWD CNTR: CPT | Mod: 26,,, | Performed by: RADIOLOGY

## 2018-10-17 PROCEDURE — 74178 CT ABD&PLV WO CNTR FLWD CNTR: CPT | Mod: TC

## 2018-10-17 PROCEDURE — 25500020 PHARM REV CODE 255: Performed by: STUDENT IN AN ORGANIZED HEALTH CARE EDUCATION/TRAINING PROGRAM

## 2018-10-17 RX ADMIN — IOHEXOL 75 ML: 350 INJECTION, SOLUTION INTRAVENOUS at 09:10

## 2018-10-20 ENCOUNTER — HOSPITAL ENCOUNTER (INPATIENT)
Facility: HOSPITAL | Age: 78
LOS: 1 days | Discharge: HOME | DRG: 312 | End: 2018-10-21
Attending: EMERGENCY MEDICINE | Admitting: HOSPITALIST
Payer: MEDICARE

## 2018-10-20 DIAGNOSIS — R55 NEAR SYNCOPE: ICD-10-CM

## 2018-10-20 DIAGNOSIS — I95.9 HYPOTENSION, UNSPECIFIED HYPOTENSION TYPE: ICD-10-CM

## 2018-10-20 DIAGNOSIS — R55 SYNCOPE, UNSPECIFIED SYNCOPE TYPE: Primary | ICD-10-CM

## 2018-10-20 DIAGNOSIS — E86.0 DEHYDRATION: ICD-10-CM

## 2018-10-20 DIAGNOSIS — N12 PYELONEPHRITIS: ICD-10-CM

## 2018-10-20 PROBLEM — I95.1 ORTHOSTATIC SYNCOPE: Status: ACTIVE | Noted: 2018-10-20

## 2018-10-20 PROBLEM — H40.9 GLAUCOMA: Status: ACTIVE | Noted: 2018-10-20

## 2018-10-20 PROBLEM — N17.9 AKI (ACUTE KIDNEY INJURY): Status: ACTIVE | Noted: 2018-10-20

## 2018-10-20 LAB
ALBUMIN SERPL BCP-MCNC: 2.5 G/DL
ALP SERPL-CCNC: 79 U/L
ALT SERPL W/O P-5'-P-CCNC: 8 U/L
ANION GAP SERPL CALC-SCNC: 9 MMOL/L
AST SERPL-CCNC: 11 U/L
BASOPHILS # BLD AUTO: 0.02 K/UL
BASOPHILS NFR BLD: 0.3 %
BILIRUB SERPL-MCNC: 0.3 MG/DL
BILIRUB UR QL STRIP: NEGATIVE
BNP SERPL-MCNC: 187 PG/ML
BUN SERPL-MCNC: 25 MG/DL
CALCIUM SERPL-MCNC: 8.7 MG/DL
CHLORIDE SERPL-SCNC: 105 MMOL/L
CLARITY UR: CLEAR
CO2 SERPL-SCNC: 27 MMOL/L
COLOR UR: YELLOW
CREAT SERPL-MCNC: 1.2 MG/DL
DIFFERENTIAL METHOD: ABNORMAL
EOSINOPHIL # BLD AUTO: 0.3 K/UL
EOSINOPHIL NFR BLD: 4.3 %
ERYTHROCYTE [DISTWIDTH] IN BLOOD BY AUTOMATED COUNT: 14 %
EST. GFR  (AFRICAN AMERICAN): 51 ML/MIN/1.73 M^2
EST. GFR  (NON AFRICAN AMERICAN): 44 ML/MIN/1.73 M^2
GLUCOSE SERPL-MCNC: 136 MG/DL
GLUCOSE UR QL STRIP: NEGATIVE
HCT VFR BLD AUTO: 30.3 %
HGB BLD-MCNC: 9.4 G/DL
HGB UR QL STRIP: NEGATIVE
KETONES UR QL STRIP: NEGATIVE
LACTATE SERPL-SCNC: 1.3 MMOL/L
LEUKOCYTE ESTERASE UR QL STRIP: NEGATIVE
LYMPHOCYTES # BLD AUTO: 1.9 K/UL
LYMPHOCYTES NFR BLD: 28.2 %
MCH RBC QN AUTO: 29.4 PG
MCHC RBC AUTO-ENTMCNC: 31 G/DL
MCV RBC AUTO: 95 FL
MONOCYTES # BLD AUTO: 0.6 K/UL
MONOCYTES NFR BLD: 8.8 %
NEUTROPHILS # BLD AUTO: 3.8 K/UL
NEUTROPHILS NFR BLD: 57.3 %
NITRITE UR QL STRIP: NEGATIVE
PH UR STRIP: 7 [PH] (ref 5–8)
PLATELET # BLD AUTO: 236 K/UL
PMV BLD AUTO: 10.3 FL
POCT GLUCOSE: 108 MG/DL (ref 70–110)
POTASSIUM SERPL-SCNC: 4.5 MMOL/L
PROT SERPL-MCNC: 6.5 G/DL
PROT UR QL STRIP: ABNORMAL
RBC # BLD AUTO: 3.2 M/UL
SODIUM SERPL-SCNC: 141 MMOL/L
SP GR UR STRIP: 1.02 (ref 1–1.03)
TROPONIN I SERPL DL<=0.01 NG/ML-MCNC: 0.01 NG/ML
TROPONIN I SERPL DL<=0.01 NG/ML-MCNC: 0.01 NG/ML
URN SPEC COLLECT METH UR: ABNORMAL
UROBILINOGEN UR STRIP-ACNC: NEGATIVE EU/DL
WBC # BLD AUTO: 6.57 K/UL

## 2018-10-20 PROCEDURE — 63600175 PHARM REV CODE 636 W HCPCS: Performed by: PHYSICIAN ASSISTANT

## 2018-10-20 PROCEDURE — 83880 ASSAY OF NATRIURETIC PEPTIDE: CPT

## 2018-10-20 PROCEDURE — 99285 EMERGENCY DEPT VISIT HI MDM: CPT | Mod: 25

## 2018-10-20 PROCEDURE — 85025 COMPLETE CBC W/AUTO DIFF WBC: CPT

## 2018-10-20 PROCEDURE — 84484 ASSAY OF TROPONIN QUANT: CPT

## 2018-10-20 PROCEDURE — 83605 ASSAY OF LACTIC ACID: CPT

## 2018-10-20 PROCEDURE — 25000003 PHARM REV CODE 250: Performed by: PHYSICIAN ASSISTANT

## 2018-10-20 PROCEDURE — 82962 GLUCOSE BLOOD TEST: CPT

## 2018-10-20 PROCEDURE — 11000001 HC ACUTE MED/SURG PRIVATE ROOM

## 2018-10-20 PROCEDURE — 81003 URINALYSIS AUTO W/O SCOPE: CPT

## 2018-10-20 PROCEDURE — 96365 THER/PROPH/DIAG IV INF INIT: CPT

## 2018-10-20 PROCEDURE — 25000003 PHARM REV CODE 250: Performed by: NURSE PRACTITIONER

## 2018-10-20 PROCEDURE — 87040 BLOOD CULTURE FOR BACTERIA: CPT

## 2018-10-20 PROCEDURE — 80053 COMPREHEN METABOLIC PANEL: CPT

## 2018-10-20 PROCEDURE — 93005 ELECTROCARDIOGRAM TRACING: CPT

## 2018-10-20 RX ORDER — CARTEOLOL HYDROCHLORIDE 10 MG/ML
1 SOLUTION OPHTHALMIC 2 TIMES DAILY
Status: DISCONTINUED | OUTPATIENT
Start: 2018-10-20 | End: 2018-10-21 | Stop reason: HOSPADM

## 2018-10-20 RX ORDER — RISPERIDONE 0.25 MG/1
1 TABLET ORAL NIGHTLY
Status: DISCONTINUED | OUTPATIENT
Start: 2018-10-20 | End: 2018-10-21 | Stop reason: HOSPADM

## 2018-10-20 RX ORDER — CIPROFLOXACIN 500 MG/1
500 TABLET ORAL EVERY 12 HOURS
Status: DISCONTINUED | OUTPATIENT
Start: 2018-10-20 | End: 2018-10-21 | Stop reason: HOSPADM

## 2018-10-20 RX ORDER — ONDANSETRON 4 MG/1
4 TABLET, ORALLY DISINTEGRATING ORAL EVERY 6 HOURS PRN
Status: DISCONTINUED | OUTPATIENT
Start: 2018-10-20 | End: 2018-10-21 | Stop reason: HOSPADM

## 2018-10-20 RX ORDER — ASPIRIN 81 MG/1
81 TABLET ORAL DAILY
Status: DISCONTINUED | OUTPATIENT
Start: 2018-10-21 | End: 2018-10-21 | Stop reason: HOSPADM

## 2018-10-20 RX ORDER — RISPERIDONE 0.5 MG/1
0.5 TABLET ORAL 2 TIMES DAILY
Status: ON HOLD | COMMUNITY
End: 2022-01-01

## 2018-10-20 RX ORDER — MEMANTINE HYDROCHLORIDE 5 MG/1
5 TABLET ORAL DAILY
Status: DISCONTINUED | OUTPATIENT
Start: 2018-10-21 | End: 2018-10-21 | Stop reason: HOSPADM

## 2018-10-20 RX ORDER — LATANOPROST 50 UG/ML
1 SOLUTION/ DROPS OPHTHALMIC NIGHTLY
Status: DISCONTINUED | OUTPATIENT
Start: 2018-10-20 | End: 2018-10-21 | Stop reason: HOSPADM

## 2018-10-20 RX ORDER — SODIUM CHLORIDE 0.9 % (FLUSH) 0.9 %
5 SYRINGE (ML) INJECTION
Status: DISCONTINUED | OUTPATIENT
Start: 2018-10-20 | End: 2018-10-21 | Stop reason: HOSPADM

## 2018-10-20 RX ORDER — DIVALPROEX SODIUM 125 MG/1
500 TABLET, DELAYED RELEASE ORAL NIGHTLY
Status: DISCONTINUED | OUTPATIENT
Start: 2018-10-20 | End: 2018-10-21 | Stop reason: HOSPADM

## 2018-10-20 RX ORDER — ONDANSETRON 2 MG/ML
4 INJECTION INTRAMUSCULAR; INTRAVENOUS EVERY 6 HOURS PRN
Status: DISCONTINUED | OUTPATIENT
Start: 2018-10-20 | End: 2018-10-21 | Stop reason: HOSPADM

## 2018-10-20 RX ORDER — ACETAMINOPHEN 325 MG/1
650 TABLET ORAL EVERY 4 HOURS PRN
Status: DISCONTINUED | OUTPATIENT
Start: 2018-10-20 | End: 2018-10-21 | Stop reason: HOSPADM

## 2018-10-20 RX ORDER — RISPERIDONE 0.25 MG/1
0.5 TABLET ORAL DAILY
Status: DISCONTINUED | OUTPATIENT
Start: 2018-10-21 | End: 2018-10-21 | Stop reason: HOSPADM

## 2018-10-20 RX ORDER — SODIUM CHLORIDE 9 MG/ML
INJECTION, SOLUTION INTRAVENOUS CONTINUOUS
Status: DISCONTINUED | OUTPATIENT
Start: 2018-10-20 | End: 2018-10-21

## 2018-10-20 RX ORDER — DIVALPROEX SODIUM 125 MG/1
250 TABLET, DELAYED RELEASE ORAL DAILY
Status: DISCONTINUED | OUTPATIENT
Start: 2018-10-21 | End: 2018-10-21 | Stop reason: HOSPADM

## 2018-10-20 RX ORDER — AMLODIPINE BESYLATE 5 MG/1
5 TABLET ORAL DAILY
Status: DISCONTINUED | OUTPATIENT
Start: 2018-10-21 | End: 2018-10-21 | Stop reason: HOSPADM

## 2018-10-20 RX ADMIN — LATANOPROST 1 DROP: 50 SOLUTION/ DROPS OPHTHALMIC at 09:10

## 2018-10-20 RX ADMIN — SODIUM CHLORIDE 1000 ML: 0.9 INJECTION, SOLUTION INTRAVENOUS at 12:10

## 2018-10-20 RX ADMIN — SODIUM CHLORIDE 500 ML: 0.9 INJECTION, SOLUTION INTRAVENOUS at 12:10

## 2018-10-20 RX ADMIN — DIVALPROEX SODIUM 500 MG: 125 TABLET, DELAYED RELEASE ORAL at 08:10

## 2018-10-20 RX ADMIN — CIPROFLOXACIN 500 MG: 500 TABLET, FILM COATED ORAL at 08:10

## 2018-10-20 RX ADMIN — CEFTRIAXONE 1 G: 1 INJECTION, SOLUTION INTRAVENOUS at 12:10

## 2018-10-20 RX ADMIN — RISPERIDONE 1 MG: 0.25 TABLET ORAL at 08:10

## 2018-10-20 RX ADMIN — SODIUM CHLORIDE: 0.9 INJECTION, SOLUTION INTRAVENOUS at 07:10

## 2018-10-20 NOTE — ASSESSMENT & PLAN NOTE
-NS @ 100 ml/hr  -BUN---25 Cr---1.2  -will renally dose medications and will avoid nephrotoxic agents  -will stop Lisinopril add Amlodipine for hypertension

## 2018-10-20 NOTE — PLAN OF CARE
Pt arrived on unit via bed. Pt is AAOx4. She is on room air. Telemetry applied on. Fall risk band applied. No lightheadedness or SOB reported. Denies chest pain.  Bed in lowest position. Bed alarm set. Pt instructed to call for assistance. VS documented refer to flowsheet. Will continue to monitor.

## 2018-10-20 NOTE — ASSESSMENT & PLAN NOTE
-NS @ 100 ml/hr  -fall precautions  -will stop Lisinopril at this time  -will encourage PO intake  -SBP range

## 2018-10-20 NOTE — SUBJECTIVE & OBJECTIVE
Past Medical History:   Diagnosis Date    Anemia     Bipolar disorder     Chronic pain     Dementia     Depression     Extrapyramidal and movement disorder     Glaucoma     Hyperlipidemia     Hypertension     Scabies 05/05/2017       Past Surgical History:   Procedure Laterality Date    CYSTOSCOPY W/ URETERAL STENT PLACEMENT Right 9/27/2018    Procedure: CYSTOSCOPY, WITH URETERAL STENT INSERTION, possible retrograde pyelogram;  Surgeon: Laura Sorenson MD;  Location: [facility] OR;  Service: Urology;  Laterality: Right;    CYSTOSCOPY, WITH URETERAL STENT INSERTION, possible retrograde pyelogram Right 9/27/2018    Performed by Laura Sorenson MD at [facility] OR       Review of patient's allergies indicates:  No Known Allergies    No current facility-administered medications on file prior to encounter.      Current Outpatient Medications on File Prior to Encounter   Medication Sig    aspirin (ECOTRIN) 81 MG EC tablet Take 81 mg by mouth once daily.    calcium carbonate/vitamin D3 (CALCIUM 600 + D,3, ORAL) Take by mouth once daily.    carteolol (OCUPRESS) 1 % ophthalmic solution Place 1 drop into the right eye 2 (two) times daily.    cetirizine (ZYRTEC) 10 MG tablet Take 10 mg by mouth every evening.    ciprofloxacin HCl (CIPRO) 500 MG tablet Take 1 tablet (500 mg total) by mouth 2 (two) times daily. for 14 days    cranberry fruit concentrate (AZO CRANBERRY ORAL) Take by mouth 3 (three) times daily.    cyanocobalamin (VITAMIN B-12) 100 MCG tablet Take 100 mcg by mouth once daily.    divalproex (DEPAKOTE) 250 MG EC tablet Take 250 mg by mouth once daily.    divalproex (DEPAKOTE) 500 MG TbEC Take 500 mg by mouth every evening.    fluticasone (FLONASE) 50 mcg/actuation nasal spray 1 spray by Each Nare route every evening.     HYDROcodone-acetaminophen (NORCO) 5-325 mg per tablet Take 1 tablet by mouth 3 (three) times daily as needed for Pain.    latanoprost 0.005 % ophthalmic solution Place 1 drop into the  right eye every evening.    lisinopril (PRINIVIL,ZESTRIL) 20 MG tablet Take 20 mg by mouth once daily.    melatonin 3 mg Tab Take by mouth every evening.    memantine (NAMENDA) 5 MG Tab Take 5 mg by mouth once daily.    methylcellulose oral powder Take by mouth once daily. 30cc In orange juice daily    multivitamin (ONE DAILY MULTIVITAMIN) per tablet Take 1 tablet by mouth once daily.    propranolol (INNOPRAN XL) 80 mg 24 hr capsule Take 80 mg by mouth once daily.    risperiDONE (RISPERDAL) 0.5 MG Tab Take 0.5 mg by mouth once daily.    risperiDONE (RISPERDAL) 1 MG tablet Take 1 mg by mouth every evening.     tiZANidine (ZANAFLEX) 2 MG tablet Take 4 mg by mouth 3 (three) times daily.    traZODone (DESYREL) 50 MG tablet Take 25 mg by mouth every evening.    docusate sodium (COLACE) 100 MG capsule Take 100 mg by mouth daily as needed for Constipation.    estradiol (ESTRACE) 0.01 % (0.1 mg/gram) vaginal cream Place 1 g vaginally twice a week. Monday and Thursday    [DISCONTINUED] propranolol (INNOPRAN XL) 80 mg 24 hr capsule Take 80 mg by mouth once daily.     Family History     None        Tobacco Use    Smoking status: Unknown If Ever Smoked   Substance and Sexual Activity    Alcohol use: Not on file    Drug use: Not on file    Sexual activity: Not on file     Review of Systems   Unable to perform ROS: Dementia   Genitourinary:        Urinary incontinence  noted on the patients nursing home records.     Objective:     Vital Signs (Most Recent):  Temp: 98.2 °F (36.8 °C) (10/20/18 1103)  Pulse: 77 (10/20/18 1502)  Resp: (!) 22 (10/20/18 1502)  BP: 126/70 (10/20/18 1502)  SpO2: 96 % (10/20/18 1502) Vital Signs (24h Range):  Temp:  [98.2 °F (36.8 °C)] 98.2 °F (36.8 °C)  Pulse:  [64-77] 77  Resp:  [12-25] 22  SpO2:  [94 %-98 %] 96 %  BP: ()/(50-70) 126/70     Weight: 79.8 kg (176 lb)  Body mass index is 28.41 kg/m².    Physical Exam   Constitutional: She appears well-developed and well-nourished.    HENT:   Head: Normocephalic and atraumatic.   Right Ear: Decreased hearing is noted.   Left Ear: Decreased hearing is noted.   Eyes: EOM are normal. Pupils are equal, round, and reactive to light.   Neck: Normal range of motion. Neck supple.   Cardiovascular: Regular rhythm, normal heart sounds and intact distal pulses. Exam reveals no friction rub.   No murmur heard.  Pulmonary/Chest: Effort normal and breath sounds normal. No respiratory distress. She has no wheezes.   Abdominal: Soft. Bowel sounds are normal. She exhibits no distension. There is no tenderness. There is no guarding.   Musculoskeletal: Normal range of motion. She exhibits no edema or tenderness.   Neurological: She is alert.   The patient is oriented to self and place.    Skin: Skin is warm and dry. Capillary refill takes less than 2 seconds.   Psychiatric: She has a normal mood and affect. Her behavior is normal.   Nursing note and vitals reviewed.        CRANIAL NERVES     CN III, IV, VI   Pupils are equal, round, and reactive to light.  Extraocular motions are normal.        Significant Labs:   Recent Lab Results       10/20/18  1531   10/20/18  1243   10/20/18  1150   10/20/18  1138   10/20/18  1134        Albumin         2.5     Alkaline Phosphatase         79     ALT         8     Anion Gap         9     Appearance, UA     Clear         AST         11     Baso #         0.02     Basophil%         0.3     Bilirubin (UA)     Negative         Total Bilirubin         0.3  Comment:  For infants and newborns, interpretation of results should be based  on gestational age, weight and in agreement with clinical  observations.  Premature Infant recommended reference ranges:  Up to 24 hours.............<8.0 mg/dL  Up to 48 hours............<12.0 mg/dL  3-5 days..................<15.0 mg/dL  6-29 days.................<15.0 mg/dL       BNP         187  Comment:  Values of less than 100 pg/ml are consistent with non-CHF populations.     BUN, Bld          25     Calcium         8.7     Chloride         105     CO2         27     Color, UA     Yellow         Creatinine         1.2     Differential Method         Automated     eGFR if          51     eGFR if non          44  Comment:  Calculation used to obtain the estimated glomerular filtration  rate (eGFR) is the CKD-EPI equation.        Eos #         0.3     Eosinophil%         4.3     Glucose         136     Glucose, UA     Negative         Gran # (ANC)         3.8     Gran%         57.3     Hematocrit         30.3     Hemoglobin         9.4     Ketones, UA     Negative         Lactate, John       1.3  Comment:  Falsely low lactic acid results can be found in samples   containing >=13.0 mg/dL total bilirubin and/or >=3.5 mg/dL   direct bilirubin.         Leukocytes, UA     Negative         Lymph #         1.9     Lymph%         28.2     MCH         29.4     MCHC         31.0     MCV         95     Mono #         0.6     Mono%         8.8     MPV         10.3     Nitrite, UA     Negative         Occult Blood UA     Negative         pH, UA     7.0         Platelets         236     POCT Glucose   108           Potassium         4.5     Total Protein         6.5     Protein, UA     Trace  Comment:  Recommend a 24 hour urine protein or a urine   protein/creatinine ratio if globulin induced proteinuria is  clinically suspected.           RBC         3.20     RDW         14.0     Sodium         141     Specific [place], UA     1.025         Specimen UA     Urine, Catheterized         Troponin I 0.014  Comment:  The reference interval for Troponin I represents the 99th percentile   cutoff   for our facility and is consistent with 3rd generation assay   performance.         0.011  Comment:  The reference interval for Troponin I represents the 99th percentile   cutoff   for our facility and is consistent with 3rd generation assay   performance.       Urobilinogen, UA     Negative         WBC          6.57                          Significant Imaging: I have reviewed and interpreted all pertinent imaging results/findings within the past 24 hours.

## 2018-10-20 NOTE — ASSESSMENT & PLAN NOTE
Chronic.  -Per the nursing home records the patient takes Risperdal 1 mg PO at bedtime and the patient takes Risperdal 0.5 mg PO in the morning. Will resume

## 2018-10-20 NOTE — ED PROVIDER NOTES
Encounter Date: 10/20/2018       History     Chief Complaint   Patient presents with    Loss of Consciousness     pt had syncopal episode while lying in bed at [facility]. Pt was hypotensive, with SBP in the 70's. Received IV fluids from EMS and is awake, alert on arrival.     Austyn Pedersen, a 78 y.o. female with  has a past medical history of Anemia, Bipolar disorder, Chronic pain, Dementia, Depression, Extrapyramidal and movement disorder, Glaucoma, Hyperlipidemia, Hypertension, and Scabies (05/05/2017).      She presents to the ED for evaluation of episode of syncope while she was at nursing home.  Nurses that she was lying in bed was placed in Trendelenburg position during this time.  Her blood pressure was taken at the [facility] home and have her systolic pressure was in the 70s at that time and heart rate in the mid 50s.  She reports this lasted few moments and patient was then alert and back to her baseline mentation.  Patient did receive IV fluids from EMS and blood pressure did improve.  Patient is alert upon arrival.  Patient has dementia, and is unclear about events, but is able to communicate how she feels and states that she did have 4 episodes of emesis  few days ago.  She states she is unsure why she is hear and feels well.  Denies any pain, chest pain, nausea, diarrhea burning with urination. No chills or body aches.  Patient was seen in this ED earlier this week and is currently taking antibiotics for pyelonephritis and is underneath the care of Dr. Sorenson as she had a stent placed in September.  Per head other at nursing home she did receive her 1st dose of antibiotics yesterday with no difficulty.  The nurse reports patient did not receive hypertensive medications propanolol and lisinopril today due to some hypotension this morning with systolics in the 90s.      The history is provided by the [facility] home and the EMS personnel.     Review of patient's allergies indicates:  No Known  Allergies  Past Medical History:   Diagnosis Date    Anemia     Bipolar disorder     Chronic pain     Dementia     Depression     Extrapyramidal and movement disorder     Glaucoma     Hyperlipidemia     Hypertension     Scabies 05/05/2017     Past Surgical History:   Procedure Laterality Date    CYSTOSCOPY W/ URETERAL STENT PLACEMENT Right 9/27/2018    Procedure: CYSTOSCOPY, WITH URETERAL STENT INSERTION, possible retrograde pyelogram;  Surgeon: Laura Sorenson MD;  Location: [facility] OR;  Service: Urology;  Laterality: Right;    CYSTOSCOPY, WITH URETERAL STENT INSERTION, possible retrograde pyelogram Right 9/27/2018    Performed by Laura Sorenson MD at [facility] OR     No family history on file.  Social History     Tobacco Use    Smoking status: Unknown If Ever Smoked   Substance Use Topics    Alcohol use: Not on file    Drug use: Not on file     Review of Systems   Unable to perform ROS: Dementia   Constitutional: Negative for activity change and fever.   HENT: Negative for sore throat.    Respiratory: Negative for shortness of breath.    Cardiovascular: Negative for chest pain.   Gastrointestinal: Positive for vomiting. Negative for abdominal pain and nausea.   Genitourinary: Negative for dysuria.   Musculoskeletal: Negative for arthralgias, back pain and myalgias.   Skin: Negative for rash and wound.   Neurological: Positive for syncope. Negative for weakness and headaches.   Hematological: Does not bruise/bleed easily.   Psychiatric/Behavioral: Positive for confusion.       Physical Exam     Initial Vitals [10/20/18 1103]   BP Pulse Resp Temp SpO2   (!) 101/51 64 16 98.2 °F (36.8 °C) --      MAP       --         Physical Exam    Nursing note and vitals reviewed.  Constitutional: Vital signs are normal. She appears well-developed and well-nourished. She is cooperative.  Non-toxic appearance. She does not appear ill. No distress.   HENT:   Head: Normocephalic and atraumatic.   Mouth/Throat: Mucous membranes  are dry.   Eyes: Conjunctivae, EOM and lids are normal. Pupils are equal, round, and reactive to light.   Neck: Neck supple. No neck rigidity.   Cardiovascular: Regular rhythm. Bradycardia present.    Pulmonary/Chest: Breath sounds normal. No respiratory distress. She has no wheezes. She has no rhonchi.   Abdominal: Soft. Normal appearance and bowel sounds are normal. There is no tenderness. There is no rigidity and no guarding.   Musculoskeletal: Normal range of motion.   Neurological: She is alert. She has normal strength. She is disoriented. No sensory deficit. GCS eye subscore is 4. GCS verbal subscore is 4. GCS motor subscore is 6.   Patient is nonweightbearing predominantly in bed and wheelchair   Skin: Skin is warm, dry and intact. No rash noted.   Psychiatric: She has a normal mood and affect. Her speech is normal and behavior is normal. Thought content normal.         ED Course   Procedures  Labs Reviewed - No data to display          Imaging Results          X-Ray Chest AP Portable (Final result)  Result time 10/20/18 11:58:44    Final result by Escobar Burt MD (10/20/18 11:58:44)                 Impression:      1. Hypoventilatory exam, likely accounts for interstitial findings, no large focal consolidation.      Electronically signed by: Escobar Burt MD  Date:    10/20/2018  Time:    11:58             Narrative:    EXAMINATION:  XR CHEST AP PORTABLE    CLINICAL HISTORY:  Chest Pain;    TECHNIQUE:  Single frontal view of the chest was performed.    COMPARISON:  09/29/2018    FINDINGS:  The cardiomediastinal silhouette is not enlarged.  There is stable elevation of the right hemidiaphragm..  There is no pleural effusion.  The trachea is midline.  The lungs are symmetrically expanded bilaterally with coarse interstitial attenuation, and bilateral basilar subsegmental atelectasis..  No large focal consolidation seen.  There is no pneumothorax.  The osseous structures are remarkable for degenerative  changes..                                 Medical Decision Making:   Initial Assessment:   78-year-old presents to the ED for evaluation of syncope just prior to arrival while at nursing home with associated hypotension and bradycardia.  Patient appears alert in no distress at this time.  She has no complaints presently.  She cannot recall events today however answers most questions appropriately and is oriented to herself and family members however not placed.  Mucous membranes are noted to be dry. Lungs CTA; heart with regular rhythm and bradycardia appreciated. Abdomen is soft and nontender. Fair range of motion of extremities with strength 3/5 bilaterally.  Differential Diagnosis:   Differential Diagnosis includes, but is not limited to:  Arrhythmia, aortic dissection, MI/unstable angina, PE, cardiogenic shock, CHF, CVA/TIA, intracranial lesion/mass, seizure, perforated viscous, ruptured AAA, orthostatic hypotension, vasovagal episode, anemia, dehydration, medication reaction, intentional overdose    Clinical Tests:   Lab Tests: Ordered and Reviewed  Radiological Study: Ordered and Reviewed  Medical Tests: Ordered and Reviewed  ED Management:  Patient was noted to be hypotensive upon arrival with systolic in the 90s.  Labs and IV fluids ordered.  EKG reveals normal sinus rhythm. Labs are grossly unremarkable for significant acute changes however when discussed with hospitalist there was noted change in GFR is from September labs in stable anemia.  This dehydration could be potentiating the syncopes.  We do note that patient did receive no antihypertensive or beta agonist medications today while at nursing home.  Chest x-ray stable. Patient appears in no distress however family expresses great concern that this is the 2nd syncope in 1 weeks and this episode I bradycardia associated with it.  The did have few episodes of hypotension in the ED however after 1500 cc bolus blood pressure did improve to systolic 130.   We did give 1 IV dose of ceftriaxone for known pyelonephritis.  Patient will be admitted to [facility] hospitalist.  As previously mentioned family is aware of plan and are amenable.              Attending Attestation:     Physician Attestation Statement for NP/PA:   I discussed this assessment and plan of this patient with the NP/PA, but I did not personally examine the patient. The face to face encounter was performed by the NP/PA.                     Clinical Impression:   The primary encounter diagnosis was Syncope, unspecified syncope type. Diagnoses of Near syncope, Hypotension, unspecified hypotension type, Dehydration, and Pyelonephritis were also pertinent to this visit.                                    JAIDEN Jane  10/20/18 1415       Eren Hinds MD  10/20/18 4741

## 2018-10-20 NOTE — ASSESSMENT & PLAN NOTE
Chronic.  SBP . Will stop Lisinopril because of elevated BUN and will start Amlodipine 5 mg PO daily. Will add b/p parameters. Will restart home propanolol with SBP and heart rate parameters.

## 2018-10-20 NOTE — H&P
[facility] Hospital Medicine  History & Physical    Patient Name: Austyn Pedersen  MRN: [id number]  Admission Date: 10/20/2018  Attending Physician: Curtis Jansen MD   Primary Care Provider: Richard King MD [facility]         Patient information was obtained from relative(s), nursing home, past medical records and ER records.     Subjective:     Principal Problem:Orthostatic syncope    Chief Complaint:   Chief Complaint   Patient presents with    Loss of Consciousness     pt had syncopal episode while lying in bed at [facility]. Pt was hypotensive, with SBP in the 70's. Received IV fluids from EMS and is awake, alert on arrival.        HPI: Ms. Austyn Pedersen is a 78 year old white female who is a resident of [facility] in [facility]. The patients primary care provider is Dr. Richard King. The patient has a past medical history of anemia, bipolar disorder, chronic pain, dementia, depression, extrapyramidal and movement disorder, glaucoma, hyperlipidemia, hypertension, scabies, E. Coli pyelonephritis, syncope, right nephrolithiasis, and acute on chronic diastolic congestive heart failure. The patient has a past surgical history of Cystoscopy with urethral stent placement to the right (9/27/18). While at the [facility] the patient had a syncopal episode while in bed this morning. The nurse at [facility] noted the patient was hypotensive with systolic b/p noted to be in the 70's per the ED notes. The patient was seen in this ED at [facility] earlier this week and is currently taking Cipro PO for pyelonephritis. The patient is under the care of Dr. Sorenson who  did the stent placement in September.  Per [facility] records the patient did receive her 1st dose of Cipro on yesterday, no difficulties were noted.  EMS was called by the [facility] staff; per the EMS records the patient received IV fluids and was noted to be awake and alert  on arrival to the ED her at [facility]. The nurse from [facility] reported all b/p medications were held this morning due to patient being hypotensive. The patient states over the last 4 days she has had 4 episodes of vomiting. The patient reports she has not been getting enough to drink. The patient denies pain, chest pain, palpitations, shortness of breath, nausea, diarrhea, or  burning with urination. The patient also denies chills or body aches at this time. Per the ED notes the pateint received an additional 1.5 liters of NS IV fluids in the ED. The patient also received IV Rocephin 1 gram  for pyelonephritis per ED notes. SBP range in the ED . Labs are remarkable for BUN--25, Cr--1.2, GFR--44, albumin--2.5, BNP---187, blood cultures were collected in the ED. CXR shows: Hypoventilatory exam, likely accounts for interstitial findings, no large focal consolidation. The pateint will be admitted to [facility] hospital medicine service for further evaluation.                         Past Medical History:   Diagnosis Date    Anemia     Bipolar disorder     Chronic pain     Dementia     Depression     Extrapyramidal and movement disorder     Glaucoma     Hyperlipidemia     Hypertension     Scabies 05/05/2017       Past Surgical History:   Procedure Laterality Date    CYSTOSCOPY W/ URETERAL STENT PLACEMENT Right 9/27/2018    Procedure: CYSTOSCOPY, WITH URETERAL STENT INSERTION, possible retrograde pyelogram;  Surgeon: Laura Sorenson MD;  Location: [facility] OR;  Service: Urology;  Laterality: Right;    CYSTOSCOPY, WITH URETERAL STENT INSERTION, possible retrograde pyelogram Right 9/27/2018    Performed by Laura Sorenson MD at [facility] OR       Review of patient's allergies indicates:  No Known Allergies    No current facility-administered medications on file prior to encounter.      Current Outpatient Medications on File Prior to Encounter   Medication Sig     aspirin (ECOTRIN) 81 MG EC tablet Take 81 mg by mouth once daily.    calcium carbonate/vitamin D3 (CALCIUM 600 + D,3, ORAL) Take by mouth once daily.    carteolol (OCUPRESS) 1 % ophthalmic solution Place 1 drop into the right eye 2 (two) times daily.    cetirizine (ZYRTEC) 10 MG tablet Take 10 mg by mouth every evening.    ciprofloxacin HCl (CIPRO) 500 MG tablet Take 1 tablet (500 mg total) by mouth 2 (two) times daily. for 14 days    cranberry fruit concentrate (AZO CRANBERRY ORAL) Take by mouth 3 (three) times daily.    cyanocobalamin (VITAMIN B-12) 100 MCG tablet Take 100 mcg by mouth once daily.    divalproex (DEPAKOTE) 250 MG EC tablet Take 250 mg by mouth once daily.    divalproex (DEPAKOTE) 500 MG TbEC Take 500 mg by mouth every evening.    fluticasone (FLONASE) 50 mcg/actuation nasal spray 1 spray by Each Nare route every evening.     HYDROcodone-acetaminophen (NORCO) 5-325 mg per tablet Take 1 tablet by mouth 3 (three) times daily as needed for Pain.    latanoprost 0.005 % ophthalmic solution Place 1 drop into the right eye every evening.    lisinopril (PRINIVIL,ZESTRIL) 20 MG tablet Take 20 mg by mouth once daily.    melatonin 3 mg Tab Take by mouth every evening.    memantine (NAMENDA) 5 MG Tab Take 5 mg by mouth once daily.    methylcellulose oral powder Take by mouth once daily. 30cc In orange juice daily    multivitamin (ONE DAILY MULTIVITAMIN) per tablet Take 1 tablet by mouth once daily.    propranolol (INNOPRAN XL) 80 mg 24 hr capsule Take 80 mg by mouth once daily.    risperiDONE (RISPERDAL) 0.5 MG Tab Take 0.5 mg by mouth once daily.    risperiDONE (RISPERDAL) 1 MG tablet Take 1 mg by mouth every evening.     tiZANidine (ZANAFLEX) 2 MG tablet Take 4 mg by mouth 3 (three) times daily.    traZODone (DESYREL) 50 MG tablet Take 25 mg by mouth every evening.    docusate sodium (COLACE) 100 MG capsule Take 100 mg by mouth daily as needed for Constipation.    estradiol  (ESTRACE) 0.01 % (0.1 mg/gram) vaginal cream Place 1 g vaginally twice a week. Monday and Thursday    [DISCONTINUED] propranolol (INNOPRAN XL) 80 mg 24 hr capsule Take 80 mg by mouth once daily.     Family History     None        Tobacco Use    Smoking status: Unknown If Ever Smoked   Substance and Sexual Activity    Alcohol use: Not on file    Drug use: Not on file    Sexual activity: Not on file     Review of Systems   Unable to perform ROS: Dementia   Genitourinary:        Urinary incontinence  noted on the patients nursing home records.     Objective:     Vital Signs (Most Recent):  Temp: 98.2 °F (36.8 °C) (10/20/18 1103)  Pulse: 77 (10/20/18 1502)  Resp: (!) 22 (10/20/18 1502)  BP: 126/70 (10/20/18 1502)  SpO2: 96 % (10/20/18 1502) Vital Signs (24h Range):  Temp:  [98.2 °F (36.8 °C)] 98.2 °F (36.8 °C)  Pulse:  [64-77] 77  Resp:  [12-25] 22  SpO2:  [94 %-98 %] 96 %  BP: ()/(50-70) 126/70     Weight: 79.8 kg (176 lb)  Body mass index is 28.41 kg/m².    Physical Exam   Constitutional: She appears well-developed and well-nourished.   HENT:   Head: Normocephalic and atraumatic.   Right Ear: Decreased hearing is noted.   Left Ear: Decreased hearing is noted.   Eyes: EOM are normal. Pupils are equal, round, and reactive to light.   Neck: Normal range of motion. Neck supple.   Cardiovascular: Regular rhythm, normal heart sounds and intact distal pulses. Exam reveals no friction rub.   No murmur heard.  Pulmonary/Chest: Effort normal and breath sounds normal. No respiratory distress. She has no wheezes.   Abdominal: Soft. Bowel sounds are normal. She exhibits no distension. There is no tenderness. There is no guarding.   Musculoskeletal: Normal range of motion. She exhibits no edema or tenderness.   Neurological: She is alert.   The patient is oriented to self and place.    Skin: Skin is warm and dry. Capillary refill takes less than 2 seconds.   Psychiatric: She has a normal mood and affect. Her behavior is  normal.   Nursing note and vitals reviewed.        CRANIAL NERVES     CN III, IV, VI   Pupils are equal, round, and reactive to light.  Extraocular motions are normal.        Significant Labs:   Recent Lab Results       10/20/18  1531   10/20/18  1243   10/20/18  1150   10/20/18  1138   10/20/18  1134        Albumin         2.5     Alkaline Phosphatase         79     ALT         8     Anion Gap         9     Appearance, UA     Clear         AST         11     Baso #         0.02     Basophil%         0.3     Bilirubin (UA)     Negative         Total Bilirubin         0.3  Comment:  For infants and newborns, interpretation of results should be based  on gestational age, weight and in agreement with clinical  observations.  Premature Infant recommended reference ranges:  Up to 24 hours.............<8.0 mg/dL  Up to 48 hours............<12.0 mg/dL  3-5 days..................<15.0 mg/dL  6-29 days.................<15.0 mg/dL       BNP         187  Comment:  Values of less than 100 pg/ml are consistent with non-CHF populations.     BUN, Bld         25     Calcium         8.7     Chloride         105     CO2         27     Color, UA     Yellow         Creatinine         1.2     Differential Method         Automated     eGFR if          51     eGFR if non          44  Comment:  Calculation used to obtain the estimated glomerular filtration  rate (eGFR) is the CKD-EPI equation.        Eos #         0.3     Eosinophil%         4.3     Glucose         136     Glucose, UA     Negative         Gran # (ANC)         3.8     Gran%         57.3     Hematocrit         30.3     Hemoglobin         9.4     Ketones, UA     Negative         Lactate, John       1.3  Comment:  Falsely low lactic acid results can be found in samples   containing >=13.0 mg/dL total bilirubin and/or >=3.5 mg/dL   direct bilirubin.         Leukocytes, UA     Negative         Lymph #         1.9     Lymph%         28.2     MCH          29.4     MCHC         31.0     MCV         95     Mono #         0.6     Mono%         8.8     MPV         10.3     Nitrite, UA     Negative         Occult Blood UA     Negative         pH, UA     7.0         Platelets         236     POCT Glucose   108           Potassium         4.5     Total Protein         6.5     Protein, UA     Trace  Comment:  Recommend a 24 hour urine protein or a urine   protein/creatinine ratio if globulin induced proteinuria is  clinically suspected.           RBC         3.20     RDW         14.0     Sodium         141     Specific Powers, UA     1.025         Specimen UA     Urine, Catheterized         Troponin I 0.014  Comment:  The reference interval for Troponin I represents the 99th percentile   cutoff   for our facility and is consistent with 3rd generation assay   performance.         0.011  Comment:  The reference interval for Troponin I represents the 99th percentile   cutoff   for our facility and is consistent with 3rd generation assay   performance.       Urobilinogen, UA     Negative         WBC         6.57                          Significant Imaging: I have reviewed and interpreted all pertinent imaging results/findings within the past 24 hours.    Assessment/Plan:     * Orthostatic syncope      -NS @ 100 ml/hr  -fall precautions  -will stop Lisinopril at this time  -will encourage PO intake  -SBP range        Glaucoma      Chronic.  Restart home medications.      Syncope           UNRULY (acute kidney injury)    -NS @ 100 ml/hr  -BUN---25 Cr---1.2  -will renally dose medications and will avoid nephrotoxic agents  -will stop Lisinopril add Amlodipine for hypertension     Chronic diastolic heart failure    Chronic.       Wheelchair dependent           Nursing home resident    Chronic. Plan to discharge the patient back to [facility].       Chronic pain    Chronic.       Debility           Essential hypertension    Chronic.  SBP . Will stop  Lisinopril because of elevated BUN and will start Amlodipine 5 mg PO daily. Will add b/p parameters. Will restart home propanolol with SBP and heart rate parameters.       Dementia without behavioral disturbance    Chronic.  -will resume home med Namenda 5 mg PO daily.     Bipolar affective disorder    Chronic.  -Per the nursing home records the patient takes Risperdal 1 mg PO at bedtime and the patient takes Risperdal 0.5 mg PO in the morning. Will resume       Pyelonephritis    -will continue Cipro 500 mg PO q12 hours.         VTE Risk Mitigation (From admission, onward)    None             Randell Dyson NP  Department of Hospital Medicine   [facility]

## 2018-10-21 VITALS
SYSTOLIC BLOOD PRESSURE: 155 MMHG | OXYGEN SATURATION: 97 % | HEIGHT: 66 IN | DIASTOLIC BLOOD PRESSURE: 75 MMHG | WEIGHT: 183.44 LBS | BODY MASS INDEX: 29.48 KG/M2 | RESPIRATION RATE: 18 BRPM | TEMPERATURE: 98 F | HEART RATE: 70 BPM

## 2018-10-21 PROBLEM — B96.20 E COLI BACTEREMIA: Status: RESOLVED | Noted: 2018-09-21 | Resolved: 2018-10-21

## 2018-10-21 PROBLEM — N17.9 AKI (ACUTE KIDNEY INJURY): Status: RESOLVED | Noted: 2018-10-20 | Resolved: 2018-10-21

## 2018-10-21 PROBLEM — R78.81 E COLI BACTEREMIA: Status: RESOLVED | Noted: 2018-09-21 | Resolved: 2018-10-21

## 2018-10-21 PROBLEM — I95.1 ORTHOSTATIC SYNCOPE: Status: RESOLVED | Noted: 2018-10-20 | Resolved: 2018-10-21

## 2018-10-21 PROBLEM — R55 SYNCOPE: Status: RESOLVED | Noted: 2018-10-20 | Resolved: 2018-10-21

## 2018-10-21 PROBLEM — I50.32 CHRONIC DIASTOLIC HEART FAILURE: Chronic | Status: ACTIVE | Noted: 2018-09-26

## 2018-10-21 LAB
ALBUMIN SERPL BCP-MCNC: 2.7 G/DL
ALP SERPL-CCNC: 82 U/L
ALT SERPL W/O P-5'-P-CCNC: 10 U/L
ANION GAP SERPL CALC-SCNC: 10 MMOL/L
AST SERPL-CCNC: 10 U/L
BILIRUB SERPL-MCNC: 0.2 MG/DL
BUN SERPL-MCNC: 20 MG/DL
CALCIUM SERPL-MCNC: 8.9 MG/DL
CHLORIDE SERPL-SCNC: 108 MMOL/L
CO2 SERPL-SCNC: 24 MMOL/L
CREAT SERPL-MCNC: 1.1 MG/DL
EST. GFR  (AFRICAN AMERICAN): 56 ML/MIN/1.73 M^2
EST. GFR  (NON AFRICAN AMERICAN): 49 ML/MIN/1.73 M^2
GLUCOSE SERPL-MCNC: 107 MG/DL
MAGNESIUM SERPL-MCNC: 1.8 MG/DL
PHOSPHATE SERPL-MCNC: 2.4 MG/DL
POTASSIUM SERPL-SCNC: 4 MMOL/L
PROT SERPL-MCNC: 7 G/DL
SODIUM SERPL-SCNC: 142 MMOL/L

## 2018-10-21 PROCEDURE — 80053 COMPREHEN METABOLIC PANEL: CPT

## 2018-10-21 PROCEDURE — 83735 ASSAY OF MAGNESIUM: CPT

## 2018-10-21 PROCEDURE — 25000242 PHARM REV CODE 250 ALT 637 W/ HCPCS: Performed by: NURSE PRACTITIONER

## 2018-10-21 PROCEDURE — 94640 AIRWAY INHALATION TREATMENT: CPT

## 2018-10-21 PROCEDURE — 25000003 PHARM REV CODE 250: Performed by: HOSPITALIST

## 2018-10-21 PROCEDURE — 84100 ASSAY OF PHOSPHORUS: CPT

## 2018-10-21 PROCEDURE — 94761 N-INVAS EAR/PLS OXIMETRY MLT: CPT

## 2018-10-21 PROCEDURE — 63600175 PHARM REV CODE 636 W HCPCS: Performed by: FAMILY MEDICINE

## 2018-10-21 PROCEDURE — 36415 COLL VENOUS BLD VENIPUNCTURE: CPT

## 2018-10-21 PROCEDURE — 25000003 PHARM REV CODE 250: Performed by: NURSE PRACTITIONER

## 2018-10-21 RX ORDER — FLUTICASONE PROPIONATE 50 MCG
1 SPRAY, SUSPENSION (ML) NASAL NIGHTLY
Status: DISCONTINUED | OUTPATIENT
Start: 2018-10-21 | End: 2018-10-21 | Stop reason: HOSPADM

## 2018-10-21 RX ORDER — PROPRANOLOL HYDROCHLORIDE 80 MG/1
80 CAPSULE, EXTENDED RELEASE ORAL DAILY
Status: DISCONTINUED | OUTPATIENT
Start: 2018-10-21 | End: 2018-10-21 | Stop reason: HOSPADM

## 2018-10-21 RX ORDER — IPRATROPIUM BROMIDE AND ALBUTEROL SULFATE 2.5; .5 MG/3ML; MG/3ML
3 SOLUTION RESPIRATORY (INHALATION) EVERY 6 HOURS
Status: DISCONTINUED | OUTPATIENT
Start: 2018-10-21 | End: 2018-10-21 | Stop reason: HOSPADM

## 2018-10-21 RX ORDER — HYDRALAZINE HYDROCHLORIDE 20 MG/ML
10 INJECTION INTRAMUSCULAR; INTRAVENOUS EVERY 6 HOURS PRN
Status: DISCONTINUED | OUTPATIENT
Start: 2018-10-21 | End: 2018-10-21 | Stop reason: HOSPADM

## 2018-10-21 RX ORDER — CETIRIZINE HYDROCHLORIDE 10 MG/1
10 TABLET ORAL NIGHTLY
Status: DISCONTINUED | OUTPATIENT
Start: 2018-10-21 | End: 2018-10-21 | Stop reason: HOSPADM

## 2018-10-21 RX ORDER — HYDRALAZINE HYDROCHLORIDE 20 MG/ML
10 INJECTION INTRAMUSCULAR; INTRAVENOUS EVERY 6 HOURS PRN
Status: DISCONTINUED | OUTPATIENT
Start: 2018-10-21 | End: 2018-10-21

## 2018-10-21 RX ORDER — HYDRALAZINE HYDROCHLORIDE 20 MG/ML
30 INJECTION INTRAMUSCULAR; INTRAVENOUS EVERY 8 HOURS PRN
Status: DISCONTINUED | OUTPATIENT
Start: 2018-10-21 | End: 2018-10-21

## 2018-10-21 RX ORDER — AMLODIPINE BESYLATE 5 MG/1
5 TABLET ORAL DAILY
Qty: 30 TABLET | Refills: 11 | Status: ON HOLD
Start: 2018-10-22 | End: 2019-04-17 | Stop reason: HOSPADM

## 2018-10-21 RX ORDER — DOCUSATE SODIUM 100 MG/1
100 CAPSULE, LIQUID FILLED ORAL DAILY PRN
Status: DISCONTINUED | OUTPATIENT
Start: 2018-10-21 | End: 2018-10-21 | Stop reason: HOSPADM

## 2018-10-21 RX ADMIN — CIPROFLOXACIN 500 MG: 500 TABLET, FILM COATED ORAL at 08:10

## 2018-10-21 RX ADMIN — PROPRANOLOL HYDROCHLORIDE 80 MG: 80 CAPSULE, EXTENDED RELEASE ORAL at 09:10

## 2018-10-21 RX ADMIN — DIVALPROEX SODIUM 250 MG: 125 TABLET, DELAYED RELEASE ORAL at 08:10

## 2018-10-21 RX ADMIN — DOCUSATE SODIUM 100 MG: 100 CAPSULE, LIQUID FILLED ORAL at 11:10

## 2018-10-21 RX ADMIN — IPRATROPIUM BROMIDE AND ALBUTEROL SULFATE 3 ML: .5; 3 SOLUTION RESPIRATORY (INHALATION) at 08:10

## 2018-10-21 RX ADMIN — AMLODIPINE BESYLATE 5 MG: 5 TABLET ORAL at 08:10

## 2018-10-21 RX ADMIN — IPRATROPIUM BROMIDE AND ALBUTEROL SULFATE 3 ML: .5; 3 SOLUTION RESPIRATORY (INHALATION) at 01:10

## 2018-10-21 RX ADMIN — SODIUM CHLORIDE: 0.9 INJECTION, SOLUTION INTRAVENOUS at 06:10

## 2018-10-21 RX ADMIN — ACETAMINOPHEN 650 MG: 325 TABLET ORAL at 05:10

## 2018-10-21 RX ADMIN — MEMANTINE HYDROCHLORIDE 5 MG: 5 TABLET ORAL at 08:10

## 2018-10-21 RX ADMIN — ASPIRIN 81 MG: 81 TABLET, COATED ORAL at 08:10

## 2018-10-21 RX ADMIN — RISPERIDONE 0.5 MG: 0.25 TABLET ORAL at 08:10

## 2018-10-21 RX ADMIN — HYDRALAZINE HYDROCHLORIDE 10 MG: 20 INJECTION INTRAMUSCULAR; INTRAVENOUS at 01:10

## 2018-10-21 RX ADMIN — HYDRALAZINE HYDROCHLORIDE 10 MG: 20 INJECTION INTRAMUSCULAR; INTRAVENOUS at 10:10

## 2018-10-21 NOTE — ASSESSMENT & PLAN NOTE
Chronic.  -183. Will stop Lisinopril because of elevated BUN and will start Amlodipine 5 mg PO daily. Will add b/p parameters. Will restart home propanolol with SBP and heart rate parameters.

## 2018-10-21 NOTE — PROGRESS NOTES
[facility] Hospital Medicine  Progress Note    Patient Name: Autsyn Pedersen  MRN: [id number]  Patient Class: IP- Inpatient   Admission Date: 10/20/2018  Length of Stay: 1 days  Attending Physician: Curtis Jansen MD  Primary Care Provider: [facility] - [email or web address]        Subjective:     Principal Problem:Orthostatic syncope    HPI:  Ms. Austyn Pedersen is a 78 year old white female who is a resident of [facility] in [facility]. The patients primary care provider is Dr. Richard King. The patient has a past medical history of anemia, bipolar disorder, chronic pain, dementia, depression, extrapyramidal and movement disorder, glaucoma, hyperlipidemia, hypertension, scabies, E. Coli pyelonephritis, syncope, right nephrolithiasis, and acute on chronic diastolic congestive heart failure. The patient has a past surgical history of Cystoscopy with urethral stent placement to the right (9/27/18). While at the [facility] the patient had a syncopal episode while in bed this morning. The nurse at [facility] noted the patient was hypotensive with systolic b/p noted to be in the 70's per the ED notes. The patient was seen in this ED at [facility] earlier this week and is currently taking Cipro PO for pyelonephritis. The patient is under the care of Dr. Sorenson who  did the stent placement in September.  Per [facility] records the patient did receive her 1st dose of Cipro on yesterday, no difficulties were noted.  EMS was called by the [facility] staff; per the EMS records the patient received IV fluids and was noted to be awake and alert on arrival to the ED her at [facility]. The nurse from [facility] reported all b/p medications were held this morning due to patient being hypotensive. The patient states over the last 4 days she has had 4 episodes of vomiting. The patient reports she has not been getting enough to drink. The  patient denies pain, chest pain, palpitations, shortness of breath, nausea, diarrhea, or  burning with urination. The patient also denies chills or body aches at this time. Per the ED notes the pateint received an additional 1.5 liters of NS IV fluids in the ED. The patient also received IV Rocephin 1 gram  for pyelonephritis per ED notes. SBP range in the ED . Labs are remarkable for BUN--25, Cr--1.2, GFR--44, albumin--2.5, BNP---187, blood cultures were collected in the ED. CXR shows: Hypoventilatory exam, likely accounts for interstitial findings, no large focal consolidation. The pateint will be admitted to [facility] medicine service for further evaluation.                         Hospital Course:  10/21/2018: patient responded well to IV fluids overnight, IV fluids d/c'd this AM. Made changes to the patients b/p medications. Will add Amlodipine and we have d/c'd lisinopril. We have added b/p and heart rate parameters. IV hydralazine added over night prn. SBP range 174-183. Will follow.     Interval History: The patient is sitting in bed watching television. No distress noted. Patient is oriented to herself and place. No family present will follow    Review of Systems   Unable to perform ROS: Dementia     Objective:     Vital Signs (Most Recent):  Temp: 96.7 °F (35.9 °C) (10/21/18 0752)  Pulse: 79 (10/21/18 1200)  Resp: 19 (10/21/18 0855)  BP: 139/75 (10/21/18 1033)  SpO2: 96 % (10/21/18 0855) Vital Signs (24h Range):  Temp:  [96.7 °F (35.9 °C)-98.7 °F (37.1 °C)] 96.7 °F (35.9 °C)  Pulse:  [] 79  Resp:  [12-25] 19  SpO2:  [94 %-100 %] 96 %  BP: (102-189)/(54-94) 139/75     Weight: 83.2 kg (183 lb 6.8 oz)  Body mass index is 29.61 kg/m².    Intake/Output Summary (Last 24 hours) at 10/21/2018 1210  Last data filed at 10/21/2018 0600  Gross per 24 hour   Intake 180 ml   Output 1706 ml   Net -1526 ml      Physical Exam   Constitutional: She appears well-developed and  well-nourished. No distress.   HENT:   Head: Normocephalic and atraumatic.   Eyes: Pupils are equal, round, and reactive to light.   Cardiovascular: Normal rate, normal heart sounds and intact distal pulses.   No murmur heard.  Pulmonary/Chest: Effort normal and breath sounds normal. No respiratory distress. She has no wheezes.   Patients lungs are clear at this time.Dry cough noted.   Abdominal: Soft. Bowel sounds are normal. She exhibits no distension. There is no tenderness. There is no guarding.   Musculoskeletal: Normal range of motion. She exhibits no edema.   Neurological: She is alert.   Oriented to person and place.    Skin: Skin is warm and dry. Capillary refill takes less than 2 seconds. She is not diaphoretic.   Psychiatric: She has a normal mood and affect. Her behavior is normal.   Nursing note and vitals reviewed.      Significant Labs:   Recent Lab Results       10/21/18  0435   10/20/18  1531   10/20/18  1243        Albumin 2.7         Alkaline Phosphatase 82         ALT 10         Anion Gap 10         AST 10         Total Bilirubin 0.2  Comment:  For infants and newborns, interpretation of results should be based  on gestational age, weight and in agreement with clinical  observations.  Premature Infant recommended reference ranges:  Up to 24 hours.............<8.0 mg/dL  Up to 48 hours............<12.0 mg/dL  3-5 days..................<15.0 mg/dL  6-29 days.................<15.0 mg/dL           BUN, Bld 20         Calcium 8.9         Chloride 108         CO2 24         Creatinine 1.1         eGFR if  56         eGFR if non  49  Comment:  Calculation used to obtain the estimated glomerular filtration  rate (eGFR) is the CKD-EPI equation.            Glucose 107         Magnesium 1.8         Phosphorus 2.4         POCT Glucose     108     Potassium 4.0         Total Protein 7.0         Sodium 142         Troponin I   0.014  Comment:  The reference interval for Troponin  I represents the 99th percentile   cutoff   for our facility and is consistent with 3rd generation assay   performance.               Significant Imaging: I have reviewed and interpreted all pertinent imaging results/findings within the past 24 hours.    Assessment/Plan:      * Orthostatic syncope      -NS @ 100 ml/hr overnight, will d/c this morning.  -fall precautions  -will stop Lisinopril at this time  -will encourage PO intake  -SBP range 174-183       Glaucoma      Chronic.  Restart home medications.      Syncope           UNRULY (acute kidney injury)    -BUN---25>20 Cr---1.2>1.1  -will renally dose medications and will avoid nephrotoxic agents  -will stop Lisinopril add Amlodipine for hypertension     Chronic diastolic heart failure    Chronic.  -cardiac diet  -cardiac monitoring       Wheelchair dependent           Nursing home resident    Chronic. Plan to discharge the patient back to [facility].       Chronic pain    Chronic.       Debility           Essential hypertension    Chronic.  -183. Will stop Lisinopril because of elevated BUN and will start Amlodipine 5 mg PO daily. Will add b/p parameters. Will restart home propanolol with SBP and heart rate parameters.       Dementia without behavioral disturbance    Chronic.  -will resume home med Namenda 5 mg PO daily.     Bipolar affective disorder    Chronic.  -Per the nursing home records the patient takes Risperdal 1 mg PO at bedtime and the patient takes Risperdal 0.5 mg PO in the morning. Will resume       Pyelonephritis    -will continue Cipro 500 mg PO q12 hours.  -IV fluid hydration overnight.         VTE Risk Mitigation (From admission, onward)        Ordered     IP VTE HIGH RISK PATIENT  Once      10/20/18 1708     Place sequential compression device  Until discontinued      10/20/18 1708              Randell Dyson NP  Department of Hospital Medicine   [facility]

## 2018-10-21 NOTE — HOSPITAL COURSE
10/21/2018: The patient will be discharged back to the nursing home. SBP range 139-180. Stopped the patients Lisinopril and will add Amlodipine 5 mg PO daily. Will resume the patients Propanolol 80 mg PO daily. We will also add parameters on the patients medications for blood pressure and heart rate. Will place an order to check vital signs daily. And also placed an order to  Encourage PO intake. Patient to be discharged back to the nursing home sometime on today.

## 2018-10-21 NOTE — ASSESSMENT & PLAN NOTE
-NS @ 100 ml/hr overnight, will d/c IV fluids this morning.  -fall precautions  -will stop Lisinopril at this time  -will encourage PO intake  -SBP range 139-189  -OK to d/c home.

## 2018-10-21 NOTE — PLAN OF CARE
Problem: Patient Care Overview  Goal: Plan of Care Review  Outcome: Ongoing (interventions implemented as appropriate)  Pt on RA sats  100%.

## 2018-10-21 NOTE — PLAN OF CARE
Problem: Patient Care Overview  Goal: Plan of Care Review  Outcome: Ongoing (interventions implemented as appropriate)  Plan of care reviewed patient verbalized understanding. IV NS infusing at 100 ml/hr. Cardiac monitoring NSR-Sinus tach. Medications administered. Bed in lowest position, call bell in reach, bed alarm on. Will continue to monitor.

## 2018-10-21 NOTE — PLAN OF CARE
Pt discharge back to [facility]. Pt is stable. Discharge teaching discussed with pt. Discussed about the new medications and its side effects and upcoming appts. Verbalized clear understanding. IV removed and exhibit no signs of active bleeding. No complaints of discomfort or pain. No respiratory distress noted. Telemetry box removed. Waiting for transport.

## 2018-10-21 NOTE — PLAN OF CARE
Problem: Patient Care Overview  Goal: Plan of Care Review  Outcome: Ongoing (interventions implemented as appropriate)  Pt on RA with documented sats. Pt has hacking heavy cough other than that, no distress noted. The proper method of use, as well as anticipated side effects, of this aerosol treatment are discussed and demonstrated to the patient.  Will continue to monitor.

## 2018-10-21 NOTE — ASSESSMENT & PLAN NOTE
Chronic.  -189.  Will stop Lisinopril because of elevated BUN and will start Amlodipine 5 mg PO daily. Will add b/p parameters. Will restart home propanolol with SBP and heart rate parameters.

## 2018-10-21 NOTE — PLAN OF CARE
TN faxed Nursing Home Orders to [facility] [phone], fax [phone], Cesar for review. Floor nurse, Sol, to call report to Ms Hale's nurse at [phone]. Dixie KIM Van to transport patient back to facility [phone],  time scheduled for 5:00 pm. TN contacted Pt's emergency contact Liya Francisco [phone] to inform her of pt's transfer back to facility. Transfer packet given to floor nurse, Sol    Future Appointments   Date Time Provider Department Center   10/25/2018 10:00 AM Laura Sorenson MD [facility] UROLOGY [facility]       Pt's nurse will go over medications/signs and symptoms prior to discharge       10/21/18 0169   Final Note   Assessment Type Final Discharge Note   Discharge Disposition long term Nu  ([facility])   What phone number can be called within the next 1-3 days to see how you are doing after discharge? [phone]  ([facility])   Hospital Follow Up  Appt(s) scheduled? No  (Offices closed for weekend. Patient to schedule own follow up appointment.)   Right Care Referral Info   Post Acute Recommendation Other     Dejah Murillo, RN Transitional Navigator  [phone]

## 2018-10-21 NOTE — PLAN OF CARE
Problem: Patient Care Overview  Goal: Plan of Care Review  Plan of care reviewed with the patient. Verbalized clear understanding. Bed alarm set. Bed in lowest position. Pt remain afebrile and free of fall. Call light within reach. Instructed pt to call when getting out of bed. Denies any pain or discomfort. NSR on tele. No report of SOB or lightheadedness. Will continue to monitor.

## 2018-10-21 NOTE — PLAN OF CARE
Report given to Rhonda LOZANO LPN @ [facility].

## 2018-10-21 NOTE — ASSESSMENT & PLAN NOTE
-NS @ 100 ml/hr overnight, will d/c this morning.  -fall precautions  -will stop Lisinopril at this time  -will encourage PO intake  -SBP range 174-183

## 2018-10-21 NOTE — ASSESSMENT & PLAN NOTE
-BUN---25>20 Cr---1.2>1.1  -will renally dose medications and will avoid nephrotoxic agents  -will stop Lisinopril add Amlodipine for hypertension

## 2018-10-21 NOTE — PLAN OF CARE
[facility]     Department of Hospital Medicine     [address] [place] [address]     [phone] fax       NURSING HOME ORDERS    10/21/2018    Admit to Nursing Home:  Regular Bed         Diagnoses:  Active Hospital Problems    Diagnosis  POA    Glaucoma [H40.9]  Yes    Chronic diastolic heart failure [I50.32]  Yes    Bipolar affective disorder [F31.9]  Yes     Chronic    Chronic pain [G89.29]  Yes     Chronic    Dementia without behavioral disturbance [F03.90]  Yes     Chronic    Essential hypertension [I10]  Yes     Chronic    Nursing home resident [Z59.3]  Not Applicable     [facility] ([address] [place] [address])      Wheelchair dependent [Z99.3]  Not Applicable     Chronic    Debility [R53.81]  Yes    Pyelonephritis [N12]  Yes      Resolved Hospital Problems    Diagnosis Date Resolved POA    *Orthostatic syncope [I95.1] 10/21/2018 Yes    UNRULY (acute kidney injury) [N17.9] 10/21/2018 Yes    Syncope [R55] 10/21/2018 Yes       Patient is homebound due to:  Orthostatic syncope    Allergies:Review of patient's allergies indicates:  No Known Allergies    Vitals:     Check vitals daily    Diet: Cardiac diet, encourage fluid intake    Acitivities:   As tolerated    Nursing Precautions:     - Aspiration precautions:             - Total assistance with meals            -  Upright 90 degrees befor during and after meals                       - Fall precautions per nursing home protocol       MISCELLANEOUS CARE:     Routine Skin for Bedridden Patients:  Apply moisture barrier cream to all    skin folds and wet areas in perineal area daily and after baths and                           all bowel movements.        Medications: Discontinue all previous medication orders, if any. See new list below.     Austyn Pedersen   Home Medication Instructions NICKY:[id number]    Printed on:10/21/18 1905   Medication Information                       amLODIPine (NORVASC) 5 MG tablet  Take 1 tablet (5 mg total) by mouth once daily. Hold for a SBP of <110             aspirin (ECOTRIN) 81 MG EC tablet  Take 81 mg by mouth once daily.             calcium carbonate/vitamin D3 (CALCIUM 600 + D,3, ORAL)  Take by mouth once daily.             carteolol (OCUPRESS) 1 % ophthalmic solution  Place 1 drop into the right eye 2 (two) times daily.             cetirizine (ZYRTEC) 10 MG tablet  Take 10 mg by mouth every evening.             ciprofloxacin HCl (CIPRO) 500 MG tablet  Take 1 tablet (500 mg total) by mouth 2 (two) times daily for 14 days  Started on 10/19 end 11/02           cranberry fruit concentrate (AZO CRANBERRY ORAL)  Take by mouth 3 (three) times daily.             cyanocobalamin (VITAMIN B-12) 100 MCG tablet  Take 100 mcg by mouth once daily.             divalproex (DEPAKOTE) 250 MG EC tablet  Take 250 mg by mouth once daily.             divalproex (DEPAKOTE) 500 MG TbEC  Take 500 mg by mouth every evening.             docusate sodium (COLACE) 100 MG capsule  Take 100 mg by mouth daily as needed for Constipation.             estradiol (ESTRACE) 0.01 % (0.1 mg/gram) vaginal cream  Place 1 g vaginally twice a week. Monday and Thursday             fluticasone (FLONASE) 50 mcg/actuation nasal spray  1 spray by Each Nare route every evening.              HYDROcodone-acetaminophen (NORCO) 5-325 mg per tablet  Take 1 tablet by mouth 3 (three) times daily as needed for Pain.             latanoprost 0.005 % ophthalmic solution  Place 1 drop into the right eye every evening.             melatonin 3 mg Tab  Take by mouth every evening.             memantine (NAMENDA) 5 MG Tab  Take 5 mg by mouth once daily.             methylcellulose oral powder  Take by mouth once daily. 30cc In orange juice daily             multivitamin (ONE DAILY MULTIVITAMIN) per tablet  Take 1 tablet by mouth once daily.             propranolol (INNOPRAN XL) 80 mg 24 hr capsule  Take 80 mg by  mouth once daily.  Hold for SBP <110 and or heart rate <60             risperiDONE (RISPERDAL) 0.5 MG Tab  Take 0.5 mg by mouth once daily.             risperiDONE (RISPERDAL) 1 MG tablet  Take 1 mg by mouth every evening.              tiZANidine (ZANAFLEX) 2 MG tablet  Take 4 mg by mouth 3 (three) times daily.             traZODone (DESYREL) 50 MG tablet  Take 25 mg by mouth every evening.                       _________________________________  Randell Dyson NP  10/21/2018

## 2018-10-21 NOTE — SUBJECTIVE & OBJECTIVE
Interval History: The patient is sitting in bed watching television. No distress noted. Patient is oriented to herself and place. No family present will follow    Review of Systems   Unable to perform ROS: Dementia     Objective:     Vital Signs (Most Recent):  Temp: 96.7 °F (35.9 °C) (10/21/18 0752)  Pulse: 79 (10/21/18 1200)  Resp: 19 (10/21/18 0855)  BP: 139/75 (10/21/18 1033)  SpO2: 96 % (10/21/18 0855) Vital Signs (24h Range):  Temp:  [96.7 °F (35.9 °C)-98.7 °F (37.1 °C)] 96.7 °F (35.9 °C)  Pulse:  [] 79  Resp:  [12-25] 19  SpO2:  [94 %-100 %] 96 %  BP: (102-189)/(54-94) 139/75     Weight: 83.2 kg (183 lb 6.8 oz)  Body mass index is 29.61 kg/m².    Intake/Output Summary (Last 24 hours) at 10/21/2018 1210  Last data filed at 10/21/2018 0600  Gross per 24 hour   Intake 180 ml   Output 1706 ml   Net -1526 ml      Physical Exam   Constitutional: She appears well-developed and well-nourished. No distress.   HENT:   Head: Normocephalic and atraumatic.   Eyes: Pupils are equal, round, and reactive to light.   Cardiovascular: Normal rate, normal heart sounds and intact distal pulses.   No murmur heard.  Pulmonary/Chest: Effort normal and breath sounds normal. No respiratory distress. She has no wheezes.   Patients lungs are clear at this time.Dry cough noted.   Abdominal: Soft. Bowel sounds are normal. She exhibits no distension. There is no tenderness. There is no guarding.   Musculoskeletal: Normal range of motion. She exhibits no edema.   Neurological: She is alert.   Oriented to person and place.    Skin: Skin is warm and dry. Capillary refill takes less than 2 seconds. She is not diaphoretic.   Psychiatric: She has a normal mood and affect. Her behavior is normal.   Nursing note and vitals reviewed.      Significant Labs:   Recent Lab Results       10/21/18  0435   10/20/18  1531   10/20/18  1243        Albumin 2.7         Alkaline Phosphatase 82         ALT 10         Anion Gap 10         AST 10          Total Bilirubin 0.2  Comment:  For infants and newborns, interpretation of results should be based  on gestational age, weight and in agreement with clinical  observations.  Premature Infant recommended reference ranges:  Up to 24 hours.............<8.0 mg/dL  Up to 48 hours............<12.0 mg/dL  3-5 days..................<15.0 mg/dL  6-29 days.................<15.0 mg/dL           BUN, Bld 20         Calcium 8.9         Chloride 108         CO2 24         Creatinine 1.1         eGFR if  56         eGFR if non  49  Comment:  Calculation used to obtain the estimated glomerular filtration  rate (eGFR) is the CKD-EPI equation.            Glucose 107         Magnesium 1.8         Phosphorus 2.4         POCT Glucose     108     Potassium 4.0         Total Protein 7.0         Sodium 142         Troponin I   0.014  Comment:  The reference interval for Troponin I represents the 99th percentile   cutoff   for our facility and is consistent with 3rd generation assay   performance.               Significant Imaging: I have reviewed and interpreted all pertinent imaging results/findings within the past 24 hours.

## 2018-10-21 NOTE — NURSING
PATIENTS SBP GREATER THAN 180 SPOKE WITH ATTENDING CARLOS VEGA HYDRALAZINE PRN Q8HRS. WILL CONTINUE TO MONITOR

## 2018-10-21 NOTE — ASSESSMENT & PLAN NOTE
Chronic.  -Per the nursing home records the patient takes Risperdal 1 mg PO at bedtime and the patient takes Risperdal 0.5 mg PO in the morning. Will resume home medications on discharge.

## 2018-10-25 ENCOUNTER — PROCEDURE VISIT (OUTPATIENT)
Dept: UROLOGY | Facility: CLINIC | Age: 78
End: 2018-10-25
Payer: MEDICARE

## 2018-10-25 VITALS
DIASTOLIC BLOOD PRESSURE: 77 MMHG | HEART RATE: 74 BPM | WEIGHT: 183 LBS | HEIGHT: 66 IN | BODY MASS INDEX: 29.41 KG/M2 | SYSTOLIC BLOOD PRESSURE: 151 MMHG | TEMPERATURE: 99 F

## 2018-10-25 DIAGNOSIS — N12 PYELONEPHRITIS: ICD-10-CM

## 2018-10-25 LAB
BACTERIA BLD CULT: NORMAL
BACTERIA BLD CULT: NORMAL

## 2018-10-25 PROCEDURE — 52310 CYSTOSCOPY AND TREATMENT: CPT | Mod: S$PBB,,, | Performed by: STUDENT IN AN ORGANIZED HEALTH CARE EDUCATION/TRAINING PROGRAM

## 2018-10-25 PROCEDURE — 52310 CYSTOSCOPY AND TREATMENT: CPT | Mod: PBBFAC,PO | Performed by: STUDENT IN AN ORGANIZED HEALTH CARE EDUCATION/TRAINING PROGRAM

## 2018-10-25 RX ORDER — FLUCONAZOLE 100 MG/1
100 TABLET ORAL DAILY
Qty: 5 TABLET | Refills: 0 | OUTPATIENT
Start: 2018-10-25 | End: 2018-10-30

## 2018-10-25 NOTE — PROCEDURES
Procedure:   1. Flexible cysto-uretheroscopy and ureteral stent removal    Pre Procedure Diagnosis:  1. Indwelling ureteral stent    Post Procedure Diagnosis:  1. same    Surgeon: Laura Sorenson MD    Anesthesia: 2% uro-jet lidocaine jelly for local analgesia    Procedure note:  A flexible cysto-urethroscopy was performed after consent was obtained.  The risks and benefits were explained. 2% lidocaine urojet was used for local analgesia. The genitalia was prepped and draped in the sterile fashion. The flexible scope was advanced into the urethra and into the bladder. The distal coil of the ureteral stent was identified and grasped with the cystoscopic alligator graspers and removed intact. The flexible cystoscope was removed. The patient tolerated the procedure well without complication.     Findings in summary:  Indwelling ureteral stent removed intact      1. Stent removal uneventful today.  2. Fluconazole 100mg x 5 days for yeast infection.  3. Can continue cipro for 3 more days.  4. Continue to encourage hydration up to 8 glasses of water/fluid daily

## 2018-10-27 ENCOUNTER — HOSPITAL ENCOUNTER (EMERGENCY)
Facility: HOSPITAL | Age: 78
Discharge: HOME | End: 2018-10-27
Attending: EMERGENCY MEDICINE
Payer: MEDICARE

## 2018-10-27 VITALS
HEART RATE: 97 BPM | WEIGHT: 183 LBS | SYSTOLIC BLOOD PRESSURE: 177 MMHG | DIASTOLIC BLOOD PRESSURE: 79 MMHG | RESPIRATION RATE: 14 BRPM | TEMPERATURE: 98 F | BODY MASS INDEX: 29.41 KG/M2 | OXYGEN SATURATION: 97 % | HEIGHT: 66 IN

## 2018-10-27 DIAGNOSIS — I95.2 HYPOTENSION DUE TO MEDICATION: Primary | ICD-10-CM

## 2018-10-27 DIAGNOSIS — R55 SYNCOPE: ICD-10-CM

## 2018-10-27 LAB
ALBUMIN SERPL BCP-MCNC: 2.8 G/DL
ALP SERPL-CCNC: 79 U/L
ALT SERPL W/O P-5'-P-CCNC: 10 U/L
ANION GAP SERPL CALC-SCNC: 8 MMOL/L
AST SERPL-CCNC: 13 U/L
BASOPHILS # BLD AUTO: 0.04 K/UL
BASOPHILS NFR BLD: 0.6 %
BILIRUB SERPL-MCNC: 0.3 MG/DL
BILIRUB UR QL STRIP: NEGATIVE
BUN SERPL-MCNC: 16 MG/DL
CALCIUM SERPL-MCNC: 8.7 MG/DL
CHLORIDE SERPL-SCNC: 105 MMOL/L
CLARITY UR: CLEAR
CO2 SERPL-SCNC: 27 MMOL/L
COLOR UR: YELLOW
CREAT SERPL-MCNC: 0.9 MG/DL
DIFFERENTIAL METHOD: ABNORMAL
EOSINOPHIL # BLD AUTO: 0.2 K/UL
EOSINOPHIL NFR BLD: 3.9 %
ERYTHROCYTE [DISTWIDTH] IN BLOOD BY AUTOMATED COUNT: 15.1 %
EST. GFR  (AFRICAN AMERICAN): >60 ML/MIN/1.73 M^2
EST. GFR  (NON AFRICAN AMERICAN): >60 ML/MIN/1.73 M^2
GLUCOSE SERPL-MCNC: 80 MG/DL
GLUCOSE UR QL STRIP: NEGATIVE
HCT VFR BLD AUTO: 30.7 %
HGB BLD-MCNC: 9.6 G/DL
HGB UR QL STRIP: NEGATIVE
KETONES UR QL STRIP: NEGATIVE
LEUKOCYTE ESTERASE UR QL STRIP: ABNORMAL
LYMPHOCYTES # BLD AUTO: 2.4 K/UL
LYMPHOCYTES NFR BLD: 38.3 %
MCH RBC QN AUTO: 29.6 PG
MCHC RBC AUTO-ENTMCNC: 31.3 G/DL
MCV RBC AUTO: 95 FL
MICROSCOPIC COMMENT: NORMAL
MONOCYTES # BLD AUTO: 0.7 K/UL
MONOCYTES NFR BLD: 11.1 %
NEUTROPHILS # BLD AUTO: 2.8 K/UL
NEUTROPHILS NFR BLD: 45.5 %
NITRITE UR QL STRIP: NEGATIVE
PH UR STRIP: 7 [PH] (ref 5–8)
PLATELET # BLD AUTO: 224 K/UL
PMV BLD AUTO: 10.3 FL
POTASSIUM SERPL-SCNC: 4.2 MMOL/L
PROT SERPL-MCNC: 6.9 G/DL
PROT UR QL STRIP: NEGATIVE
RBC # BLD AUTO: 3.24 M/UL
SODIUM SERPL-SCNC: 140 MMOL/L
SP GR UR STRIP: 1.01 (ref 1–1.03)
URN SPEC COLLECT METH UR: ABNORMAL
UROBILINOGEN UR STRIP-ACNC: NEGATIVE EU/DL
WBC # BLD AUTO: 6.21 K/UL
WBC #/AREA URNS HPF: 5 /HPF (ref 0–5)

## 2018-10-27 PROCEDURE — 85025 COMPLETE CBC W/AUTO DIFF WBC: CPT

## 2018-10-27 PROCEDURE — 96361 HYDRATE IV INFUSION ADD-ON: CPT

## 2018-10-27 PROCEDURE — 81000 URINALYSIS NONAUTO W/SCOPE: CPT

## 2018-10-27 PROCEDURE — 99284 EMERGENCY DEPT VISIT MOD MDM: CPT | Mod: 25

## 2018-10-27 PROCEDURE — 80053 COMPREHEN METABOLIC PANEL: CPT

## 2018-10-27 PROCEDURE — 93005 ELECTROCARDIOGRAM TRACING: CPT

## 2018-10-27 PROCEDURE — 96360 HYDRATION IV INFUSION INIT: CPT

## 2018-10-27 PROCEDURE — 93010 ELECTROCARDIOGRAM REPORT: CPT | Mod: ,,, | Performed by: INTERNAL MEDICINE

## 2018-10-27 PROCEDURE — 25000003 PHARM REV CODE 250: Performed by: EMERGENCY MEDICINE

## 2018-10-27 RX ADMIN — SODIUM CHLORIDE 1000 ML: 0.9 INJECTION, SOLUTION INTRAVENOUS at 02:10

## 2018-10-27 NOTE — ED PROVIDER NOTES
Encounter Date: 10/27/2018    SCRIBE #1 NOTE: I, Brenna Conteh, am scribing for, and in the presence of,  Dr. Hinds. I have scribed the entire note.       History     Chief Complaint   Patient presents with    Loss of Consciousness     pt presents to ED today via EMS who reports pt is a resident from [place] reports pts friend was visiting her and pt has a syncopal epidose while in bed. episode last a few seconds. Pt's BP reported to be 90/60 ,     Time seen by the provider: 2:06 PM    This is a 78 y.o. female with a PMHx of Dementia and HTN who presents to the Emergency Department from [facility] s/p episode of syncope PTA. She states she was lying in bed when she passed out. She states this is her third episode of low blood pressure this week. She is asymptomatic; denies fever, nausea, or vomiting. Patient reports a prior history of similar symptoms; she was seen in the ED 1 week ago after a similar episode.         The history is provided by the patient.     Review of patient's allergies indicates:  No Known Allergies  Past Medical History:   Diagnosis Date    Anemia     Bipolar disorder     Chronic pain     Dementia     Depression     E coli bacteremia 9/21/2018    Extrapyramidal and movement disorder     Glaucoma     Hyperlipidemia     Hypertension     Scabies 05/05/2017     Past Surgical History:   Procedure Laterality Date    CYSTOSCOPY W/ URETERAL STENT PLACEMENT Right 9/27/2018    Procedure: CYSTOSCOPY, WITH URETERAL STENT INSERTION, possible retrograde pyelogram;  Surgeon: Laura Sorenson MD;  Location: [facility] OR;  Service: Urology;  Laterality: Right;    CYSTOSCOPY, WITH URETERAL STENT INSERTION, possible retrograde pyelogram Right 9/27/2018    Performed by Laura Sorenson MD at [facility] OR     History reviewed. No pertinent family history.  Social History     Tobacco Use    Smoking status: Unknown If Ever Smoked    Smokeless tobacco: Never Used   Substance Use Topics    Alcohol  use: Not on file    Drug use: Not on file     Review of Systems   Constitutional: Negative for activity change, appetite change, chills, diaphoresis, fatigue and fever.   HENT: Negative for sore throat.    Eyes: Negative for visual disturbance.   Respiratory: Negative for cough, chest tightness and shortness of breath.    Cardiovascular: Negative for chest pain and palpitations.   Gastrointestinal: Negative for abdominal distention, abdominal pain, diarrhea, nausea and vomiting.   Endocrine: Negative for polydipsia and polyphagia.   Genitourinary: Negative for difficulty urinating, dysuria and flank pain.   Musculoskeletal: Negative for arthralgias.   Skin: Negative for pallor and rash.   Neurological: Negative for dizziness, light-headedness and headaches.   Psychiatric/Behavioral: Negative for confusion.   All other systems reviewed and are negative.      Physical Exam     Initial Vitals [10/27/18 1324]   BP Pulse Resp Temp SpO2   (!) 111/56 69 20 97.7 °F (36.5 °C) 96 %      MAP       --         Physical Exam    Nursing note and vitals reviewed.  Constitutional: She appears well-developed and well-nourished. No distress.   HENT:   Head: Normocephalic and atraumatic.   Mouth/Throat: Oropharynx is clear and moist. Mucous membranes are dry.   Mucous membranes are slightly dry.   Eyes: Conjunctivae and EOM are normal. Pupils are equal, round, and reactive to light.   Neck: Normal range of motion. Neck supple.   Cardiovascular: Normal rate, regular rhythm and normal heart sounds. Exam reveals no gallop and no friction rub.    No murmur heard.  Pulmonary/Chest: Breath sounds normal. No respiratory distress. She has no wheezes. She has no rhonchi. She has no rales.   Abdominal: Soft. Bowel sounds are normal. She exhibits no distension. There is no tenderness.   Musculoskeletal: Normal range of motion. She exhibits edema. She exhibits no tenderness.   Trace LE edema bilaterally.   Neurological: She is alert and oriented  to person, place, and time. She has normal strength. No cranial nerve deficit.   Skin: Skin is warm and dry. Capillary refill takes less than 2 seconds.   Psychiatric: She has a normal mood and affect. Her behavior is normal.         ED Course   Procedures  Labs Reviewed   CBC W/ AUTO DIFFERENTIAL - Abnormal; Notable for the following components:       Result Value    RBC 3.24 (*)     Hemoglobin 9.6 (*)     Hematocrit 30.7 (*)     MCHC 31.3 (*)     RDW 15.1 (*)     All other components within normal limits   COMPREHENSIVE METABOLIC PANEL - Abnormal; Notable for the following components:    Albumin 2.8 (*)     All other components within normal limits   URINALYSIS - Abnormal; Notable for the following components:    Leukocytes, UA Trace (*)     All other components within normal limits   URINALYSIS MICROSCOPIC     EKG Readings: (Independently Interpreted)   Rhythm: Normal Sinus Rhythm. Heart Rate: 68. Ectopy: No Ectopy. Conduction: Normal. ST Segments: Normal ST Segments. T Waves: Normal. Clinical Impression: Normal Sinus Rhythm   2:35 PM         Imaging Results    None          Medical Decision Making:   Clinical Tests:   Lab Tests: Ordered and Reviewed  ED Management:  78-year-old female who had a syncopal episode at her nursing home.  Patient a recent admission here for this which was believed to be due to her blood pressure medication.  This was switched upon discharge. Today's lab work is stable.  She has remained awake and alert here in the ED with no complaints. I have reviewed her prior head CT which is unremarkable. All these findings were discussed with the [facility] hospitalist, Dr. Garcia, who agrees there is no need to readmit the patient for this.  Was found to be hypotensive at the nursing home of believes is due to her medications.  She is currently on Cipro as well as Zanaflex, which together can cause profound hypotension.  These will be discontinued and I have suggested she follow up with the primary  physician as soon as able for recheck and further treatment as warranted.                      Clinical Impression:     1. Hypotension due to medication    2. Syncope            Disposition:   Disposition: Discharged  Condition: Stable    I, Dr. Eren Hinds, personally performed the services described in this documentation. All medical record entries made by the scribe were at my direction and in my presence. I have reviewed the chart and agree that the record reflects my personal performance and is accurate and complete. Eren Hinds MD.  11:21 AM 10/31/2018                     Eren Hinds MD  10/31/18 1124

## 2019-04-13 ENCOUNTER — HOSPITAL ENCOUNTER (INPATIENT)
Facility: HOSPITAL | Age: 79
LOS: 1 days | DRG: 292 | End: 2019-04-17
Attending: EMERGENCY MEDICINE | Admitting: FAMILY MEDICINE
Payer: MEDICARE

## 2019-04-13 DIAGNOSIS — I51.89 DIASTOLIC DYSFUNCTION: ICD-10-CM

## 2019-04-13 DIAGNOSIS — R55 SYNCOPE, UNSPECIFIED SYNCOPE TYPE: Primary | ICD-10-CM

## 2019-04-13 DIAGNOSIS — I63.9 STROKE: ICD-10-CM

## 2019-04-13 DIAGNOSIS — R07.9 CHEST PAIN: ICD-10-CM

## 2019-04-13 DIAGNOSIS — R55 SYNCOPE: ICD-10-CM

## 2019-04-13 PROBLEM — T40.2X5A CONSTIPATION DUE TO OPIOID THERAPY: Chronic | Status: ACTIVE | Noted: 2018-09-22

## 2019-04-13 PROBLEM — N12 PYELONEPHRITIS: Status: RESOLVED | Noted: 2018-09-21 | Resolved: 2019-04-13

## 2019-04-13 PROBLEM — N20.0 RIGHT NEPHROLITHIASIS: Status: RESOLVED | Noted: 2018-09-21 | Resolved: 2019-04-13

## 2019-04-13 PROBLEM — H40.9 GLAUCOMA: Chronic | Status: ACTIVE | Noted: 2018-10-20

## 2019-04-13 PROBLEM — K59.03 CONSTIPATION DUE TO OPIOID THERAPY: Chronic | Status: ACTIVE | Noted: 2018-09-22

## 2019-04-13 LAB
ALBUMIN SERPL BCP-MCNC: 3.3 G/DL (ref 3.5–5.2)
ALP SERPL-CCNC: 97 U/L (ref 55–135)
ALT SERPL W/O P-5'-P-CCNC: 17 U/L (ref 10–44)
ANION GAP SERPL CALC-SCNC: 10 MMOL/L (ref 8–16)
AST SERPL-CCNC: 16 U/L (ref 10–40)
BASOPHILS # BLD AUTO: 0.03 K/UL (ref 0–0.2)
BASOPHILS NFR BLD: 0.4 % (ref 0–1.9)
BILIRUB SERPL-MCNC: 0.6 MG/DL (ref 0.1–1)
BNP SERPL-MCNC: 113 PG/ML (ref 0–99)
BUN SERPL-MCNC: 16 MG/DL (ref 8–23)
CALCIUM SERPL-MCNC: 9.5 MG/DL (ref 8.7–10.5)
CHLORIDE SERPL-SCNC: 101 MMOL/L (ref 95–110)
CHOLEST SERPL-MCNC: 199 MG/DL (ref 120–199)
CHOLEST/HDLC SERPL: 5.2 {RATIO} (ref 2–5)
CO2 SERPL-SCNC: 28 MMOL/L (ref 23–29)
CREAT SERPL-MCNC: 1 MG/DL (ref 0.5–1.4)
CREAT SERPL-MCNC: 1.1 MG/DL (ref 0.5–1.4)
DIFFERENTIAL METHOD: ABNORMAL
EOSINOPHIL # BLD AUTO: 0.3 K/UL (ref 0–0.5)
EOSINOPHIL NFR BLD: 3.6 % (ref 0–8)
ERYTHROCYTE [DISTWIDTH] IN BLOOD BY AUTOMATED COUNT: 14.9 % (ref 11.5–14.5)
EST. GFR  (AFRICAN AMERICAN): >60 ML/MIN/1.73 M^2
EST. GFR  (NON AFRICAN AMERICAN): 55 ML/MIN/1.73 M^2
GLUCOSE SERPL-MCNC: 112 MG/DL (ref 70–110)
HCT VFR BLD AUTO: 36.5 % (ref 37–48.5)
HDLC SERPL-MCNC: 38 MG/DL (ref 40–75)
HDLC SERPL: 19.1 % (ref 20–50)
HGB BLD-MCNC: 11.3 G/DL (ref 12–16)
INR PPP: 1 (ref 0.8–1.2)
LACTATE SERPL-SCNC: 1.1 MMOL/L (ref 0.5–2.2)
LDLC SERPL CALC-MCNC: 114.6 MG/DL (ref 63–159)
LYMPHOCYTES # BLD AUTO: 2.8 K/UL (ref 1–4.8)
LYMPHOCYTES NFR BLD: 37.2 % (ref 18–48)
MCH RBC QN AUTO: 27.9 PG (ref 27–31)
MCHC RBC AUTO-ENTMCNC: 31 G/DL (ref 32–36)
MCV RBC AUTO: 90 FL (ref 82–98)
MONOCYTES # BLD AUTO: 0.9 K/UL (ref 0.3–1)
MONOCYTES NFR BLD: 11.9 % (ref 4–15)
NEUTROPHILS # BLD AUTO: 3.5 K/UL (ref 1.8–7.7)
NEUTROPHILS NFR BLD: 46.6 % (ref 38–73)
NONHDLC SERPL-MCNC: 161 MG/DL
PLATELET # BLD AUTO: 246 K/UL (ref 150–350)
PMV BLD AUTO: 10.4 FL (ref 9.2–12.9)
POC PTINR: 1.1 (ref 0.9–1.2)
POC PTWBT: 13.4 SEC (ref 9.7–14.3)
POCT GLUCOSE: 120 MG/DL (ref 70–110)
POTASSIUM SERPL-SCNC: 4 MMOL/L (ref 3.5–5.1)
PROT SERPL-MCNC: 7 G/DL (ref 6–8.4)
PROTHROMBIN TIME: 10.8 SEC (ref 9–12.5)
RBC # BLD AUTO: 4.05 M/UL (ref 4–5.4)
SAMPLE: NORMAL
SAMPLE: NORMAL
SODIUM SERPL-SCNC: 139 MMOL/L (ref 136–145)
T4 FREE SERPL-MCNC: 1.05 NG/DL (ref 0.71–1.51)
TRIGL SERPL-MCNC: 232 MG/DL (ref 30–150)
TSH SERPL DL<=0.005 MIU/L-ACNC: 4.16 UIU/ML (ref 0.4–4)
VALPROATE SERPL-MCNC: 41.9 UG/ML (ref 50–100)
WBC # BLD AUTO: 7.4 K/UL (ref 3.9–12.7)

## 2019-04-13 PROCEDURE — 82565 ASSAY OF CREATININE: CPT

## 2019-04-13 PROCEDURE — 85610 PROTHROMBIN TIME: CPT

## 2019-04-13 PROCEDURE — G0378 HOSPITAL OBSERVATION PER HR: HCPCS

## 2019-04-13 PROCEDURE — 93010 ELECTROCARDIOGRAM REPORT: CPT | Mod: 76,,, | Performed by: INTERNAL MEDICINE

## 2019-04-13 PROCEDURE — 93010 EKG 12-LEAD: ICD-10-PCS | Mod: ,,, | Performed by: INTERNAL MEDICINE

## 2019-04-13 PROCEDURE — 84439 ASSAY OF FREE THYROXINE: CPT

## 2019-04-13 PROCEDURE — 93010 ELECTROCARDIOGRAM REPORT: CPT | Mod: ,,, | Performed by: INTERNAL MEDICINE

## 2019-04-13 PROCEDURE — 83880 ASSAY OF NATRIURETIC PEPTIDE: CPT

## 2019-04-13 PROCEDURE — 85025 COMPLETE CBC W/AUTO DIFF WBC: CPT

## 2019-04-13 PROCEDURE — 80053 COMPREHEN METABOLIC PANEL: CPT

## 2019-04-13 PROCEDURE — 96374 THER/PROPH/DIAG INJ IV PUSH: CPT

## 2019-04-13 PROCEDURE — 80164 ASSAY DIPROPYLACETIC ACD TOT: CPT

## 2019-04-13 PROCEDURE — 63600175 PHARM REV CODE 636 W HCPCS: Performed by: EMERGENCY MEDICINE

## 2019-04-13 PROCEDURE — 83605 ASSAY OF LACTIC ACID: CPT

## 2019-04-13 PROCEDURE — 25000242 PHARM REV CODE 250 ALT 637 W/ HCPCS: Performed by: HOSPITALIST

## 2019-04-13 PROCEDURE — 36415 COLL VENOUS BLD VENIPUNCTURE: CPT

## 2019-04-13 PROCEDURE — 25000003 PHARM REV CODE 250: Performed by: HOSPITALIST

## 2019-04-13 PROCEDURE — 80061 LIPID PANEL: CPT

## 2019-04-13 PROCEDURE — 93005 ELECTROCARDIOGRAM TRACING: CPT

## 2019-04-13 PROCEDURE — 96375 TX/PRO/DX INJ NEW DRUG ADDON: CPT

## 2019-04-13 PROCEDURE — 82962 GLUCOSE BLOOD TEST: CPT

## 2019-04-13 PROCEDURE — 84443 ASSAY THYROID STIM HORMONE: CPT

## 2019-04-13 PROCEDURE — 99285 EMERGENCY DEPT VISIT HI MDM: CPT | Mod: 25

## 2019-04-13 RX ORDER — ACETAMINOPHEN 325 MG/1
650 TABLET ORAL EVERY 6 HOURS PRN
Status: DISCONTINUED | OUTPATIENT
Start: 2019-04-13 | End: 2019-04-17 | Stop reason: HOSPADM

## 2019-04-13 RX ORDER — RISPERIDONE 0.25 MG/1
0.5 TABLET ORAL 2 TIMES DAILY
Status: DISCONTINUED | OUTPATIENT
Start: 2019-04-13 | End: 2019-04-17 | Stop reason: HOSPADM

## 2019-04-13 RX ORDER — DIVALPROEX SODIUM 250 MG/1
250 TABLET, DELAYED RELEASE ORAL 2 TIMES DAILY
Status: DISCONTINUED | OUTPATIENT
Start: 2019-04-13 | End: 2019-04-17 | Stop reason: HOSPADM

## 2019-04-13 RX ORDER — AMLODIPINE BESYLATE 5 MG/1
5 TABLET ORAL DAILY
Status: DISCONTINUED | OUTPATIENT
Start: 2019-04-14 | End: 2019-04-15

## 2019-04-13 RX ORDER — TIZANIDINE 4 MG/1
4 TABLET ORAL 3 TIMES DAILY
Status: DISCONTINUED | OUTPATIENT
Start: 2019-04-13 | End: 2019-04-17 | Stop reason: HOSPADM

## 2019-04-13 RX ORDER — SODIUM CHLORIDE 0.9 % (FLUSH) 0.9 %
10 SYRINGE (ML) INJECTION
Status: DISCONTINUED | OUTPATIENT
Start: 2019-04-13 | End: 2019-04-17 | Stop reason: HOSPADM

## 2019-04-13 RX ORDER — ONDANSETRON 2 MG/ML
4 INJECTION INTRAMUSCULAR; INTRAVENOUS
Status: COMPLETED | OUTPATIENT
Start: 2019-04-13 | End: 2019-04-13

## 2019-04-13 RX ORDER — ASPIRIN 81 MG/1
81 TABLET ORAL DAILY
Status: DISCONTINUED | OUTPATIENT
Start: 2019-04-14 | End: 2019-04-17 | Stop reason: HOSPADM

## 2019-04-13 RX ORDER — METOCLOPRAMIDE HYDROCHLORIDE 5 MG/ML
10 INJECTION INTRAMUSCULAR; INTRAVENOUS
Status: COMPLETED | OUTPATIENT
Start: 2019-04-13 | End: 2019-04-13

## 2019-04-13 RX ORDER — DOCUSATE SODIUM 100 MG/1
100 CAPSULE, LIQUID FILLED ORAL DAILY PRN
Status: DISCONTINUED | OUTPATIENT
Start: 2019-04-13 | End: 2019-04-15

## 2019-04-13 RX ORDER — PROPRANOLOL HYDROCHLORIDE 20 MG/1
40 TABLET ORAL DAILY
COMMUNITY

## 2019-04-13 RX ORDER — TIZANIDINE 4 MG/1
4 TABLET ORAL 2 TIMES DAILY PRN
COMMUNITY

## 2019-04-13 RX ORDER — ONDANSETRON 2 MG/ML
4 INJECTION INTRAMUSCULAR; INTRAVENOUS EVERY 8 HOURS PRN
Status: DISCONTINUED | OUTPATIENT
Start: 2019-04-13 | End: 2019-04-17 | Stop reason: HOSPADM

## 2019-04-13 RX ORDER — MEMANTINE HYDROCHLORIDE 5 MG/1
5 TABLET ORAL DAILY
Status: DISCONTINUED | OUTPATIENT
Start: 2019-04-14 | End: 2019-04-17 | Stop reason: HOSPADM

## 2019-04-13 RX ORDER — LATANOPROST 50 UG/ML
1 SOLUTION/ DROPS OPHTHALMIC NIGHTLY
Status: DISCONTINUED | OUTPATIENT
Start: 2019-04-13 | End: 2019-04-17 | Stop reason: HOSPADM

## 2019-04-13 RX ORDER — HYDROCODONE BITARTRATE AND ACETAMINOPHEN 5; 325 MG/1; MG/1
1 TABLET ORAL 3 TIMES DAILY PRN
Status: DISCONTINUED | OUTPATIENT
Start: 2019-04-13 | End: 2019-04-17 | Stop reason: HOSPADM

## 2019-04-13 RX ORDER — CETIRIZINE HYDROCHLORIDE 10 MG/1
10 TABLET ORAL NIGHTLY
Status: DISCONTINUED | OUTPATIENT
Start: 2019-04-13 | End: 2019-04-17 | Stop reason: HOSPADM

## 2019-04-13 RX ORDER — FLUTICASONE PROPIONATE 50 MCG
1 SPRAY, SUSPENSION (ML) NASAL NIGHTLY
Status: DISCONTINUED | OUTPATIENT
Start: 2019-04-13 | End: 2019-04-17 | Stop reason: HOSPADM

## 2019-04-13 RX ORDER — PNV NO.95/FERROUS FUM/FOLIC AC 28MG-0.8MG
100 TABLET ORAL DAILY
Status: DISCONTINUED | OUTPATIENT
Start: 2019-04-14 | End: 2019-04-17 | Stop reason: HOSPADM

## 2019-04-13 RX ORDER — KETOROLAC TROMETHAMINE 30 MG/ML
15 INJECTION, SOLUTION INTRAMUSCULAR; INTRAVENOUS
Status: COMPLETED | OUTPATIENT
Start: 2019-04-13 | End: 2019-04-13

## 2019-04-13 RX ORDER — PROPRANOLOL HYDROCHLORIDE 20 MG/1
40 TABLET ORAL DAILY
Status: DISCONTINUED | OUTPATIENT
Start: 2019-04-14 | End: 2019-04-17 | Stop reason: HOSPADM

## 2019-04-13 RX ADMIN — METOCLOPRAMIDE 10 MG: 5 INJECTION, SOLUTION INTRAMUSCULAR; INTRAVENOUS at 04:04

## 2019-04-13 RX ADMIN — CETIRIZINE HYDROCHLORIDE 10 MG: 10 TABLET, FILM COATED ORAL at 09:04

## 2019-04-13 RX ADMIN — DIVALPROEX SODIUM 250 MG: 250 TABLET, DELAYED RELEASE ORAL at 09:04

## 2019-04-13 RX ADMIN — LATANOPROST 1 DROP: 50 SOLUTION OPHTHALMIC at 09:04

## 2019-04-13 RX ADMIN — KETOROLAC TROMETHAMINE 15 MG: 30 INJECTION, SOLUTION INTRAMUSCULAR at 04:04

## 2019-04-13 RX ADMIN — RISPERIDONE 0.5 MG: 0.25 TABLET, FILM COATED ORAL at 09:04

## 2019-04-13 RX ADMIN — TRAZODONE HYDROCHLORIDE 25 MG: 50 TABLET ORAL at 09:04

## 2019-04-13 RX ADMIN — TIZANIDINE 4 MG: 4 TABLET ORAL at 09:04

## 2019-04-13 RX ADMIN — ONDANSETRON 4 MG: 2 INJECTION INTRAMUSCULAR; INTRAVENOUS at 04:04

## 2019-04-13 NOTE — ED PROVIDER NOTES
Encounter Date: 4/13/2019    SCRIBE #1 NOTE: I, Sarahi Stokes, am scribing for, and in the presence of,  Dr. Sharma. I have scribed the entire note.       History     Chief Complaint   Patient presents with    Aphasia     pt reportedly had a syncopal episode while at the table. after pt regaining consciousness, pt is noted to have slurred speech and confusion.      Time seen by provider: 2:13 PM    This is a 78 y.o. female who presents via EMS after syncopal episode at the nursing home during lunch time. She complains of abdominal pain, back pain and a HA that began yesterday. The patient denies any chest pain or weakness.           The history is provided by the patient.     Review of patient's allergies indicates:  No Known Allergies  Past Medical History:   Diagnosis Date    Anemia     Bipolar disorder     Chronic pain     Dementia     Depression     E coli bacteremia 9/21/2018    Extrapyramidal and movement disorder     Glaucoma     Hyperlipidemia     Hypertension     Pyelonephritis 9/21/2018    Right nephrolithiasis 9/21/2018    Scabies 05/05/2017     Past Surgical History:   Procedure Laterality Date    CYSTOSCOPY, WITH URETERAL STENT INSERTION, possible retrograde pyelogram Right 9/27/2018    Performed by Laura Sorenson MD at [facility] OR     History reviewed. No pertinent family history.  Social History     Tobacco Use    Smoking status: Unknown If Ever Smoked    Smokeless tobacco: Never Used   Substance Use Topics    Alcohol use: Not on file    Drug use: Not on file     Review of Systems   Constitutional: Negative for chills and fever.   HENT: Negative for congestion, rhinorrhea and sore throat.    Eyes: Negative for redness and visual disturbance.   Respiratory: Negative for cough, shortness of breath and wheezing.    Cardiovascular: Negative for chest pain and palpitations.   Gastrointestinal: Positive for abdominal pain. Negative for diarrhea, nausea and vomiting.   Genitourinary:  "Negative for dysuria and hematuria.   Musculoskeletal: Positive for back pain. Negative for myalgias and neck pain.   Skin: Negative for rash.   Neurological: Positive for syncope and headaches. Negative for dizziness, weakness and light-headedness.   Psychiatric/Behavioral: Negative for confusion.       Physical Exam     Initial Vitals   BP Pulse Resp Temp SpO2   04/13/19 1417 04/13/19 1417 04/13/19 1417 -- 04/13/19 1502   133/61 65 20  96 %      MAP       --                Physical Exam    Nursing note and vitals reviewed.  Constitutional: She appears well-developed and well-nourished. She is not diaphoretic. No distress.   Extremely agitated.    HENT:   Head: Normocephalic and atraumatic.   Mouth/Throat: Oropharynx is clear and moist.   Eyes: Conjunctivae and EOM are normal. Pupils are equal, round, and reactive to light.   Neck: Normal range of motion. Neck supple.   Cardiovascular: Normal rate, regular rhythm and normal heart sounds. Exam reveals no gallop and no friction rub.    No murmur heard.  Pulmonary/Chest: Breath sounds normal. She has no wheezes. She has no rhonchi. She has no rales.   Abdominal: Soft. Bowel sounds are normal. There is no tenderness. There is no rebound and no guarding.   Musculoskeletal: Normal range of motion. She exhibits no edema or tenderness.   Lymphadenopathy:     She has no cervical adenopathy.   Neurological: She is alert and oriented to person, place, and time. She has normal strength.   Skin: Skin is warm and dry. Capillary refill takes less than 2 seconds. No rash noted.   Psychiatric:   Loud, obnoxious, angry. Denies any SI or hallucinations. Patient admits to being bipolar and states she is "in the middle" right now and this is her baseline "angry".          ED Course   Procedures  Labs Reviewed   CBC W/ AUTO DIFFERENTIAL - Abnormal; Notable for the following components:       Result Value    Hemoglobin 11.3 (*)     Hematocrit 36.5 (*)     MCHC 31.0 (*)     RDW 14.9 (*)  "    All other components within normal limits   COMPREHENSIVE METABOLIC PANEL - Abnormal; Notable for the following components:    Glucose 112 (*)     Albumin 3.3 (*)     eGFR if non  55 (*)     All other components within normal limits   TSH - Abnormal; Notable for the following components:    TSH 4.155 (*)     All other components within normal limits   LIPID PANEL - Abnormal; Notable for the following components:    Triglycerides 232 (*)     HDL 38 (*)     HDL/Chol Ratio 19.1 (*)     Total Cholesterol/HDL Ratio 5.2 (*)     All other components within normal limits   POCT GLUCOSE - Abnormal; Notable for the following components:    POCT Glucose 120 (*)     All other components within normal limits   PROTIME-INR   LACTIC ACID, PLASMA   T4, FREE   POCT GLUCOSE, HAND-HELD DEVICE   ISTAT PROCEDURE   ISTAT CREATININE     EKG Readings: (Independently Interpreted)   2:12PM  Rate 62 bpm.   Normal sinus rhythm with no ST-T wave changes.        Imaging Results          X-Ray Chest AP Portable (Final result)  Result time 04/13/19 15:41:40    Final result by Hank Sigala MD (04/13/19 15:41:40)                 Impression:      As above.      Electronically signed by: Hank Sigala MD  Date:    04/13/2019  Time:    15:41             Narrative:    EXAMINATION:  XR CHEST AP PORTABLE    CLINICAL HISTORY:  Stroke;    TECHNIQUE:  Single frontal view of the chest was performed.    COMPARISON:  Chest radiograph 10/20/2018 and CT thorax 09/25/2018    FINDINGS:  Monitoring leads overlie the chest.  Resolution is limited by body habitus with underpenetration.    Cardiomediastinal silhouette is midline and similar to prior.  Stable elevation of the right hemidiaphragm.  The lungs are well expanded bilaterally with nonspecific coarse interstitial attenuation grossly similar to prior which can be seen with small vessels disease including pulmonary edema or small airways disease.  No large consolidation or new focal opacity.   No pleural effusion or pneumothorax.  No acute osseous process seen.  PA and lateral views can be obtained.                               CT Head Without Contrast (Final result)  Result time 04/13/19 14:23:20    Final result by Hank Sigala MD (04/13/19 14:23:20)                 Impression:      No acute large vascular territory infarct or intracranial hemorrhage identified.  Further evaluation/follow-up as warranted.    Age-related senescent changes as above.      Electronically signed by: Hank Sigala MD  Date:    04/13/2019  Time:    14:23             Narrative:    EXAMINATION:  CT HEAD WITHOUT CONTRAST    CLINICAL HISTORY:  Motor neuron disease;    TECHNIQUE:  Low dose axial CT images obtained throughout the head without intravenous contrast. Sagittal and coronal reconstructions were performed.    COMPARISON:  Head CT 09/21/2018    FINDINGS:  Intracranial compartment:    Generalized cerebral volume loss.  Ventricles are midline and stable in size and configuration without distortion by mass effect or acute hydrocephalus.  No extra-axial blood or fluid collections.    Grossly stable mild degree supratentorial white matter chronic small vessel ischemic change bilateral basal ganglia calcifications noted.  No definite new focal areas of abnormal parenchymal attenuation.  No parenchymal mass, hemorrhage, edema or major vascular distribution infarct.  Skull base atherosclerotic vascular calcifications noted.    Skull/extracranial contents (limited evaluation): Hyperostosis frontalis interna.  No fracture. Mastoid air cells and paranasal sinuses are essentially clear.  Postsurgical changes of the bilateral orbits noted.                                                   ED Course as of Apr 13 1711   Sat Apr 13, 2019 1711 Case discussed with Dr. Jansen.  They will come and evaluate the patient for admission.    [ST]      ED Course User Index  [ST] Sofy Sharma MD     Clinical Impression:     1. Syncope,  unspecified syncope type    2. syncope            I, Sofy Sharma, personally performed the services described in this documentation. All medical record entries made by the scribe were at my direction and in my presence.  I have reviewed the chart and agree that the record reflects my personal performance and is accurate and complete. Sofy Sharma M.D. 4:31 PM04/13/2019                 Sofy Sharma MD  04/13/19 1631       Sofy Sharma MD  04/13/19 1632       Sofy Sharma MD  04/13/19 1711

## 2019-04-13 NOTE — ASSESSMENT & PLAN NOTE
Continue home risperidone 0.5 mg BID, divalproex 250 mg BID, trazodone 25 mg HS. Check valproate level.

## 2019-04-13 NOTE — ASSESSMENT & PLAN NOTE
No orthostasis. Sudden syncope without preceding lightheadedness concerning for cardiac etiology. Telemetry. Echocardiogram.

## 2019-04-13 NOTE — SUBJECTIVE & OBJECTIVE
Past Medical History:   Diagnosis Date    Anemia     Bipolar disorder     Chronic diastolic heart failure     Chronic pain     Dementia     Depression     E coli bacteremia 9/21/2018    Extrapyramidal and movement disorder     Glaucoma     Hyperlipidemia     Hypertension     Pyelonephritis 9/21/2018    Right nephrolithiasis 9/21/2018    Scabies 05/05/2017       Past Surgical History:   Procedure Laterality Date    CYSTOSCOPY, WITH URETERAL STENT INSERTION, possible retrograde pyelogram Right 9/27/2018    Performed by Laura Sorenson MD at [facility] OR       Review of patient's allergies indicates:  No Known Allergies    No current facility-administered medications on file prior to encounter.      Current Outpatient Medications on File Prior to Encounter   Medication Sig    amLODIPine (NORVASC) 5 MG tablet Take 1 tablet (5 mg total) by mouth once daily. Hold for a SBP of <110    aspirin (ECOTRIN) 81 MG EC tablet Take 81 mg by mouth once daily.    calcium carbonate/vitamin D3 (CALCIUM 600 + D,3, ORAL) Take by mouth once daily.    carteolol (OCUPRESS) 1 % ophthalmic solution Place 1 drop into the right eye 2 (two) times daily.    cetirizine (ZYRTEC) 10 MG tablet Take 10 mg by mouth every evening.    cyanocobalamin (VITAMIN B-12) 100 MCG tablet Take 100 mcg by mouth once daily.    divalproex (DEPAKOTE) 250 MG EC tablet Take 250 mg by mouth 2 (two) times daily.     docusate sodium (COLACE) 100 MG capsule Take 100 mg by mouth daily as needed for Constipation.    estradiol (ESTRACE) 0.01 % (0.1 mg/gram) vaginal cream Place 1 g vaginally twice a week. Monday and Thursday    fluticasone (FLONASE) 50 mcg/actuation nasal spray 1 spray by Each Nare route every evening.     HYDROcodone-acetaminophen (NORCO) 5-325 mg per tablet Take 1 tablet by mouth 3 (three) times daily as needed for Pain.    latanoprost 0.005 % ophthalmic solution Place 1 drop into the right eye every evening.    melatonin 3 mg Tab Take  by mouth every evening.    memantine (NAMENDA) 5 MG Tab Take 5 mg by mouth once daily.    methylcellulose oral powder Take by mouth once daily. 30cc In orange juice daily    multivitamin (ONE DAILY MULTIVITAMIN) per tablet Take 1 tablet by mouth once daily.    propranolol (INDERAL) 40 MG tablet Take 40 mg by mouth once daily.    risperiDONE (RISPERDAL) 0.5 MG Tab Take 0.5 mg by mouth 2 (two) times daily.     tiZANidine (ZANAFLEX) 4 MG tablet Take 4 mg by mouth 3 (three) times daily.    traZODone (DESYREL) 50 MG tablet Take 25 mg by mouth every evening.    [DISCONTINUED] cranberry fruit concentrate (AZO CRANBERRY ORAL) Take by mouth 3 (three) times daily.    [DISCONTINUED] divalproex (DEPAKOTE) 500 MG TbEC Take 500 mg by mouth every evening.    [DISCONTINUED] propranolol (INNOPRAN XL) 80 mg 24 hr capsule Take 80 mg by mouth once daily.    [DISCONTINUED] risperiDONE (RISPERDAL) 1 MG tablet Take 1 mg by mouth every evening.      Family History     None        Tobacco Use    Smoking status: Unknown If Ever Smoked    Smokeless tobacco: Never Used   Substance and Sexual Activity    Alcohol use: Not on file    Drug use: Not on file    Sexual activity: Not on file     Review of Systems   Constitutional: Negative for chills and fever.   HENT: Negative for trouble swallowing and voice change.    Eyes: Negative for pain and redness.   Respiratory: Negative for cough and shortness of breath.    Cardiovascular: Positive for leg swelling. Negative for chest pain.   Gastrointestinal: Negative for nausea and vomiting.   Genitourinary: Negative for difficulty urinating and dysuria.   Musculoskeletal: Negative for neck pain and neck stiffness.   Skin: Negative for rash and wound.   Neurological: Positive for syncope. Negative for seizures.     Objective:     Vital Signs (Most Recent):  Pulse: 74 (04/13/19 1734)  Resp: (!) 32 (04/13/19 1734)  BP: (!) 164/78 (04/13/19 1734)  SpO2: (!) 94 % (04/13/19 1734) Vital Signs  (24h Range):  Pulse:  [61-74] 74  Resp:  [11-32] 32  SpO2:  [94 %-96 %] 94 %  BP: (129-164)/(61-78) 164/78        There is no height or weight on file to calculate BMI.    Physical Exam   Constitutional: She is oriented to person, place, and time. She appears well-developed. No distress.   HENT:   Head: Normocephalic and atraumatic.   Eyes: Conjunctivae are normal. No scleral icterus.   Neck: Neck supple. No tracheal deviation present.   Cardiovascular: Normal rate and regular rhythm.   Pulmonary/Chest: Effort normal. No respiratory distress.   Musculoskeletal: She exhibits edema. She exhibits no tenderness.   Neurological: She is alert and oriented to person, place, and time.   Skin: Skin is warm and dry.   Psychiatric: She has a normal mood and affect.   Nursing note and vitals reviewed.          Significant Labs: All pertinent labs within the past 24 hours have been reviewed.    Significant Imaging: I have reviewed all pertinent imaging results/findings within the past 24 hours.   X-Ray Chest AP Portable 4/13/19: FINDINGS:  Monitoring leads overlie the chest.  Resolution is limited by body habitus with underpenetration.  Cardiomediastinal silhouette is midline and similar to prior.  Stable elevation of the right hemidiaphragm.  The lungs are well expanded bilaterally with nonspecific coarse interstitial attenuation grossly similar to prior which can be seen with small vessels disease including pulmonary edema or small airways disease.  No large consolidation or new focal opacity.  No pleural effusion or pneumothorax.  No acute osseous process seen.  PA and lateral views can be obtained.

## 2019-04-13 NOTE — H&P
[facility] Medicine  History & Physical    Patient Name: Austyn Pedersen  MRN: [id number]  Admission Date: 4/13/2019  Attending Physician: Curtis Jansen MD  Primary Care Provider: Richard King MD [facility]         Patient information was obtained from patient, relative(s), nursing home, past medical records and ER records.     Subjective:     Principal Problem:Syncope    Chief Complaint:   Chief Complaint   Patient presents with    Aphasia     pt reportedly had a syncopal episode while at the table. after pt regaining consciousness, pt is noted to have slurred speech and confusion.         HPI: Austyn Pedersen is a 78 y.o. white woman with hypertension, chronic diastolic heart failure, hyperlipidemia, glaucoma, bipolar disorder, dementia, chronic pain, overactive bladder. She has resided at [facility] in [place], [place] since 12/2/15. She is . She is wheelchair dependent. Her primary care physician is Dr. Richard King.   On Saturday 4/13/19, she was eating lunch in the cafeteria and was about to eat a [place] bar when she spontaneously passed out without any preceding symptoms. She subsequently had slurred speech so was taken to [facility] Emergency Department. EKG showed normal sinus rhythm. Cardiac enzymes were not checked. Head CT showed nothing acute. Chest X-ray showed nonspecific coarse interstitial attenuation bilaterally grossly similar to prior chest X-ray on 10/20/18. Her blood pressure increased from 145/67 to 164/78 when standing. She was admitted to [facility] for further evaluation. On physical examination, she had leg edema but noted that she had wrist edema as well, which she does not usually get.    Past Medical History:   Diagnosis Date    Anemia     Bipolar disorder     Chronic diastolic heart failure     Chronic pain     Dementia     Depression     E coli bacteremia 9/21/2018    Extrapyramidal and  movement disorder     Glaucoma     Hyperlipidemia     Hypertension     Pyelonephritis 9/21/2018    Right nephrolithiasis 9/21/2018    Scabies 05/05/2017       Past Surgical History:   Procedure Laterality Date    CYSTOSCOPY, WITH URETERAL STENT INSERTION, possible retrograde pyelogram Right 9/27/2018    Performed by Laura Sorenson MD at [facility] OR       Review of patient's allergies indicates:  No Known Allergies    No current facility-administered medications on file prior to encounter.      Current Outpatient Medications on File Prior to Encounter   Medication Sig    amLODIPine (NORVASC) 5 MG tablet Take 1 tablet (5 mg total) by mouth once daily. Hold for a SBP of <110    aspirin (ECOTRIN) 81 MG EC tablet Take 81 mg by mouth once daily.    calcium carbonate/vitamin D3 (CALCIUM 600 + D,3, ORAL) Take by mouth once daily.    carteolol (OCUPRESS) 1 % ophthalmic solution Place 1 drop into the right eye 2 (two) times daily.    cetirizine (ZYRTEC) 10 MG tablet Take 10 mg by mouth every evening.    cyanocobalamin (VITAMIN B-12) 100 MCG tablet Take 100 mcg by mouth once daily.    divalproex (DEPAKOTE) 250 MG EC tablet Take 250 mg by mouth 2 (two) times daily.     docusate sodium (COLACE) 100 MG capsule Take 100 mg by mouth daily as needed for Constipation.    estradiol (ESTRACE) 0.01 % (0.1 mg/gram) vaginal cream Place 1 g vaginally twice a week. Monday and Thursday    fluticasone (FLONASE) 50 mcg/actuation nasal spray 1 spray by Each Nare route every evening.     HYDROcodone-acetaminophen (NORCO) 5-325 mg per tablet Take 1 tablet by mouth 3 (three) times daily as needed for Pain.    latanoprost 0.005 % ophthalmic solution Place 1 drop into the right eye every evening.    melatonin 3 mg Tab Take by mouth every evening.    memantine (NAMENDA) 5 MG Tab Take 5 mg by mouth once daily.    methylcellulose oral powder Take by mouth once daily. 30cc In orange juice daily    multivitamin (ONE DAILY MULTIVITAMIN)  per tablet Take 1 tablet by mouth once daily.    propranolol (INDERAL) 40 MG tablet Take 40 mg by mouth once daily.    risperiDONE (RISPERDAL) 0.5 MG Tab Take 0.5 mg by mouth 2 (two) times daily.     tiZANidine (ZANAFLEX) 4 MG tablet Take 4 mg by mouth 3 (three) times daily.    traZODone (DESYREL) 50 MG tablet Take 25 mg by mouth every evening.    [DISCONTINUED] cranberry fruit concentrate (AZO CRANBERRY ORAL) Take by mouth 3 (three) times daily.    [DISCONTINUED] divalproex (DEPAKOTE) 500 MG TbEC Take 500 mg by mouth every evening.    [DISCONTINUED] propranolol (INNOPRAN XL) 80 mg 24 hr capsule Take 80 mg by mouth once daily.    [DISCONTINUED] risperiDONE (RISPERDAL) 1 MG tablet Take 1 mg by mouth every evening.      Family History     None        Tobacco Use    Smoking status: Unknown If Ever Smoked    Smokeless tobacco: Never Used   Substance and Sexual Activity    Alcohol use: Not on file    Drug use: Not on file    Sexual activity: Not on file     Review of Systems   Constitutional: Negative for chills and fever.   HENT: Negative for trouble swallowing and voice change.    Eyes: Negative for pain and redness.   Respiratory: Negative for cough and shortness of breath.    Cardiovascular: Positive for leg swelling. Negative for chest pain.   Gastrointestinal: Negative for nausea and vomiting.   Genitourinary: Negative for difficulty urinating and dysuria.   Musculoskeletal: Negative for neck pain and neck stiffness.   Skin: Negative for rash and wound.   Neurological: Positive for syncope. Negative for seizures.     Objective:     Vital Signs (Most Recent):  Pulse: 74 (04/13/19 1734)  Resp: (!) 32 (04/13/19 1734)  BP: (!) 164/78 (04/13/19 1734)  SpO2: (!) 94 % (04/13/19 1734) Vital Signs (24h Range):  Pulse:  [61-74] 74  Resp:  [11-32] 32  SpO2:  [94 %-96 %] 94 %  BP: (129-164)/(61-78) 164/78        There is no height or weight on file to calculate BMI.    Physical Exam   Constitutional: She is  oriented to person, place, and time. She appears well-developed. No distress.   HENT:   Head: Normocephalic and atraumatic.   Eyes: Conjunctivae are normal. No scleral icterus.   Neck: Neck supple. No tracheal deviation present.   Cardiovascular: Normal rate and regular rhythm.   Pulmonary/Chest: Effort normal. No respiratory distress.   Musculoskeletal: She exhibits edema. She exhibits no tenderness.   Neurological: She is alert and oriented to person, place, and time.   Skin: Skin is warm and dry.   Psychiatric: She has a normal mood and affect.   Nursing note and vitals reviewed.          Significant Labs: All pertinent labs within the past 24 hours have been reviewed.    Significant Imaging: I have reviewed all pertinent imaging results/findings within the past 24 hours.   X-Ray Chest AP Portable 4/13/19: FINDINGS:  Monitoring leads overlie the chest.  Resolution is limited by body habitus with underpenetration.  Cardiomediastinal silhouette is midline and similar to prior.  Stable elevation of the right hemidiaphragm.  The lungs are well expanded bilaterally with nonspecific coarse interstitial attenuation grossly similar to prior which can be seen with small vessels disease including pulmonary edema or small airways disease.  No large consolidation or new focal opacity.  No pleural effusion or pneumothorax.  No acute osseous process seen.  PA and lateral views can be obtained.      Assessment/Plan:     * Syncope  No orthostasis. Sudden syncope without preceding lightheadedness concerning for cardiac etiology. Telemetry. Echocardiogram.    Glaucoma  Continue home latanoprost eye drops. Carteolol eye drops are not in hospital formulary.    Chronic diastolic heart failure  Has more peripheral edema than she is used to. Check BNP. Echocardiogram.    Constipation due to opioid therapy  Continue home docusate prn.    Nursing home resident  Wheelchair dependent  Return to [facility] on  discharge.    Chronic pain  Continue home tizanidine, hydrocodone-acetaminophen.    Essential hypertension  Continue home amlodipine and propranolol.    Dementia without behavioral disturbance  Continue home memantine.    Bipolar affective disorder  Continue home risperidone 0.5 mg BID, divalproex 250 mg BID, trazodone 25 mg HS. Check valproate level.    VTE Risk Mitigation (From admission, onward)    None             Curtis Jansen MD  Department of Hospital Medicine   [facility]

## 2019-04-13 NOTE — HPI
Austyn Pedersen is a 78 y.o. white woman with hypertension, chronic diastolic heart failure, hyperlipidemia, glaucoma, bipolar disorder, dementia, chronic pain, overactive bladder. She has resided at [facility] in [place], [place] since 12/2/15. She is . She is wheelchair dependent. Her primary care physician is Dr. Richard King.   On Saturday 4/13/19, she was eating lunch in the cafeteria and was about to eat a [place] bar when she spontaneously passed out without any preceding symptoms. She subsequently had slurred speech so was taken to [facility]. EKG showed normal sinus rhythm. Cardiac enzymes were not checked. Head CT showed nothing acute. Chest X-ray showed nonspecific coarse interstitial attenuation bilaterally grossly similar to prior chest X-ray on 10/20/18. Her blood pressure increased from 145/67 to 164/78 when standing. She was admitted to [facility] for further evaluation. On physical examination, she had leg edema but noted that she had wrist edema as well, which she does not usually get.

## 2019-04-14 LAB
ANION GAP SERPL CALC-SCNC: 9 MMOL/L (ref 8–16)
BASOPHILS # BLD AUTO: 0.03 K/UL (ref 0–0.2)
BASOPHILS NFR BLD: 0.6 % (ref 0–1.9)
BUN SERPL-MCNC: 14 MG/DL (ref 8–23)
CALCIUM SERPL-MCNC: 9.4 MG/DL (ref 8.7–10.5)
CHLORIDE SERPL-SCNC: 103 MMOL/L (ref 95–110)
CO2 SERPL-SCNC: 29 MMOL/L (ref 23–29)
CREAT SERPL-MCNC: 0.8 MG/DL (ref 0.5–1.4)
DIFFERENTIAL METHOD: ABNORMAL
EOSINOPHIL # BLD AUTO: 0.2 K/UL (ref 0–0.5)
EOSINOPHIL NFR BLD: 4.2 % (ref 0–8)
ERYTHROCYTE [DISTWIDTH] IN BLOOD BY AUTOMATED COUNT: 15 % (ref 11.5–14.5)
EST. GFR  (AFRICAN AMERICAN): >60 ML/MIN/1.73 M^2
EST. GFR  (NON AFRICAN AMERICAN): >60 ML/MIN/1.73 M^2
GLUCOSE SERPL-MCNC: 102 MG/DL (ref 70–110)
HCT VFR BLD AUTO: 37.3 % (ref 37–48.5)
HGB BLD-MCNC: 11.6 G/DL (ref 12–16)
LYMPHOCYTES # BLD AUTO: 1.8 K/UL (ref 1–4.8)
LYMPHOCYTES NFR BLD: 34.2 % (ref 18–48)
MCH RBC QN AUTO: 28.2 PG (ref 27–31)
MCHC RBC AUTO-ENTMCNC: 31.1 G/DL (ref 32–36)
MCV RBC AUTO: 91 FL (ref 82–98)
MONOCYTES # BLD AUTO: 0.7 K/UL (ref 0.3–1)
MONOCYTES NFR BLD: 12.8 % (ref 4–15)
NEUTROPHILS # BLD AUTO: 2.6 K/UL (ref 1.8–7.7)
NEUTROPHILS NFR BLD: 48 % (ref 38–73)
PLATELET # BLD AUTO: 216 K/UL (ref 150–350)
PMV BLD AUTO: 10.9 FL (ref 9.2–12.9)
POCT GLUCOSE: 100 MG/DL (ref 70–110)
POTASSIUM SERPL-SCNC: 3.8 MMOL/L (ref 3.5–5.1)
RBC # BLD AUTO: 4.12 M/UL (ref 4–5.4)
SODIUM SERPL-SCNC: 141 MMOL/L (ref 136–145)
WBC # BLD AUTO: 5.3 K/UL (ref 3.9–12.7)

## 2019-04-14 PROCEDURE — 25000003 PHARM REV CODE 250: Performed by: HOSPITALIST

## 2019-04-14 PROCEDURE — G0378 HOSPITAL OBSERVATION PER HR: HCPCS

## 2019-04-14 PROCEDURE — 94761 N-INVAS EAR/PLS OXIMETRY MLT: CPT

## 2019-04-14 PROCEDURE — 25000242 PHARM REV CODE 250 ALT 637 W/ HCPCS: Performed by: HOSPITALIST

## 2019-04-14 PROCEDURE — 85025 COMPLETE CBC W/AUTO DIFF WBC: CPT

## 2019-04-14 PROCEDURE — 80048 BASIC METABOLIC PNL TOTAL CA: CPT

## 2019-04-14 PROCEDURE — 36415 COLL VENOUS BLD VENIPUNCTURE: CPT

## 2019-04-14 RX ADMIN — TIZANIDINE 4 MG: 4 TABLET ORAL at 03:04

## 2019-04-14 RX ADMIN — AMLODIPINE BESYLATE 5 MG: 5 TABLET ORAL at 09:04

## 2019-04-14 RX ADMIN — DIVALPROEX SODIUM 250 MG: 250 TABLET, DELAYED RELEASE ORAL at 09:04

## 2019-04-14 RX ADMIN — HYDROCODONE BITARTRATE AND ACETAMINOPHEN 1 TABLET: 5; 325 TABLET ORAL at 04:04

## 2019-04-14 RX ADMIN — Medication 100 MCG: at 09:04

## 2019-04-14 RX ADMIN — RISPERIDONE 0.5 MG: 0.25 TABLET, FILM COATED ORAL at 09:04

## 2019-04-14 RX ADMIN — FLUTICASONE PROPIONATE 50 MCG: 50 SPRAY, METERED NASAL at 09:04

## 2019-04-14 RX ADMIN — HYDROCODONE BITARTRATE AND ACETAMINOPHEN 1 TABLET: 5; 325 TABLET ORAL at 05:04

## 2019-04-14 RX ADMIN — PROPRANOLOL HYDROCHLORIDE 40 MG: 20 TABLET ORAL at 09:04

## 2019-04-14 RX ADMIN — TIZANIDINE 4 MG: 4 TABLET ORAL at 09:04

## 2019-04-14 RX ADMIN — ASPIRIN 81 MG: 81 TABLET, COATED ORAL at 09:04

## 2019-04-14 RX ADMIN — CETIRIZINE HYDROCHLORIDE 10 MG: 10 TABLET, FILM COATED ORAL at 09:04

## 2019-04-14 RX ADMIN — LATANOPROST 1 DROP: 50 SOLUTION OPHTHALMIC at 09:04

## 2019-04-14 RX ADMIN — MEMANTINE HYDROCHLORIDE 5 MG: 5 TABLET ORAL at 09:04

## 2019-04-14 RX ADMIN — TRAZODONE HYDROCHLORIDE 25 MG: 50 TABLET ORAL at 09:04

## 2019-04-14 NOTE — PLAN OF CARE
Problem: Adult Inpatient Plan of Care  Goal: Plan of Care Review  Outcome: Ongoing (interventions implemented as appropriate)  1958H Received patient from ED per stretcher; awake and orientedx4. Care plan explained and verbalized understanding. VIP care model introduced. On room air, O2 saturation 93%. Hooked to heart monitor running Sinus Rhythm at 76bpm. IV site to left antecubital 18G and right hand 20G; flushed an saline locked. Instructed on low salt, low fat diet. Instructed on fall precaution. Fall risk contract signed. Bed in lowest position, bed alarms on, call light within reach and instructed to call for help when needed. Will continue  to monitor.    Patient is hard of hearing, speaks loud and requesting for her night time medications. Awaiting for the pharmacy to send it. Refused to answer admit questions. Will continue to monitor.

## 2019-04-14 NOTE — PLAN OF CARE
"Patient refused to complete admit questions except for a few. She is upset that she cannot find channel "360" on the TV.  She yells out that she is very "angry" and "will NOT' answer anything else! David, floor nurse at bedside aware is situation and Catrina charge nurse also notified.  "

## 2019-04-14 NOTE — PLAN OF CARE
"Problem: Adult Inpatient Plan of Care  Goal: Plan of Care Review  Outcome: Ongoing (interventions implemented as appropriate)  Plan of care reviewed with patient. Patient voiced understanding. NSR on monitor. No acute distress noted. Frequent weight shifting encouraged. Pt stated "I will turn myself in order to prevent bedsores." Side rails x 2, bed in lowest position, call bell within reach, pt advised to call for assistance. Maintain bed alarm for patient safety.          "

## 2019-04-15 LAB
AORTIC ROOT ANNULUS: 3.02 CM
AV INDEX (PROSTH): 0.8
AV MEAN GRADIENT: 1.8 MMHG
AV PEAK GRADIENT: 3.31 MMHG
AV VALVE AREA: 2.55 CM2
AV VELOCITY RATIO: 0.9
BACTERIA #/AREA URNS HPF: NORMAL /HPF
BILIRUB UR QL STRIP: NEGATIVE
CLARITY UR: CLEAR
COLOR UR: YELLOW
CV ECHO LV RWT: 0.52 CM
DOP CALC AO PEAK VEL: 0.91 M/S
DOP CALC AO VTI: 18.98 CM
DOP CALC LVOT AREA: 3.17 CM2
DOP CALC LVOT DIAMETER: 2.01 CM
DOP CALC LVOT PEAK VEL: 0.82 M/S
DOP CALC LVOT STROKE VOLUME: 48.43 CM3
DOP CALCLVOT PEAK VEL VTI: 15.27 CM
ECHO LV POSTERIOR WALL: 1.03 CM (ref 0.6–1.1)
FRACTIONAL SHORTENING: 35 % (ref 28–44)
GLUCOSE UR QL STRIP: NEGATIVE
HGB UR QL STRIP: ABNORMAL
INTERVENTRICULAR SEPTUM: 0.85 CM (ref 0.6–1.1)
IVRT: 0.09 MSEC
KETONES UR QL STRIP: NEGATIVE
LA MAJOR: 4.02 CM
LA MINOR: 4.63 CM
LA WIDTH: 4.18 CM
LEFT ATRIUM SIZE: 3.55 CM
LEFT ATRIUM VOLUME INDEX: 27.4 ML/M2
LEFT ATRIUM VOLUME: 54.28 CM3
LEFT INTERNAL DIMENSION IN SYSTOLE: 2.61 CM (ref 2.1–4)
LEFT VENTRICLE DIASTOLIC VOLUME INDEX: 35.23 ML/M2
LEFT VENTRICLE DIASTOLIC VOLUME: 69.89 ML
LEFT VENTRICLE MASS INDEX: 58.7 G/M2
LEFT VENTRICLE SYSTOLIC VOLUME INDEX: 12.5 ML/M2
LEFT VENTRICLE SYSTOLIC VOLUME: 24.75 ML
LEFT VENTRICULAR INTERNAL DIMENSION IN DIASTOLE: 4 CM (ref 3.5–6)
LEFT VENTRICULAR MASS: 116.5 G
LEUKOCYTE ESTERASE UR QL STRIP: ABNORMAL
LV LATERAL E/E' RATIO: 12
MICROSCOPIC COMMENT: NORMAL
MV PEAK E VEL: 0.6 M/S
NITRITE UR QL STRIP: NEGATIVE
PH UR STRIP: 7 [PH] (ref 5–8)
PROT UR QL STRIP: NEGATIVE
PULM VEIN S/D RATIO: 1
PV PEAK D VEL: 0.22 M/S
PV PEAK S VEL: 0.22 M/S
RA MAJOR: 4.15 CM
RA PRESSURE: 3 MMHG
RBC #/AREA URNS HPF: 1 /HPF (ref 0–4)
RIGHT VENTRICULAR END-DIASTOLIC DIMENSION: 3.11 CM
SP GR UR STRIP: <=1.005 (ref 1–1.03)
SQUAMOUS #/AREA URNS HPF: 0 /HPF
TDI LATERAL: 0.05
URN SPEC COLLECT METH UR: ABNORMAL
UROBILINOGEN UR STRIP-ACNC: NEGATIVE EU/DL
WBC #/AREA URNS HPF: 1 /HPF (ref 0–5)
WBC CLUMPS URNS QL MICRO: NORMAL
YEAST URNS QL MICRO: NORMAL

## 2019-04-15 PROCEDURE — 25000003 PHARM REV CODE 250: Performed by: PHYSICIAN ASSISTANT

## 2019-04-15 PROCEDURE — 25000003 PHARM REV CODE 250: Performed by: FAMILY MEDICINE

## 2019-04-15 PROCEDURE — 94761 N-INVAS EAR/PLS OXIMETRY MLT: CPT

## 2019-04-15 PROCEDURE — G0378 HOSPITAL OBSERVATION PER HR: HCPCS

## 2019-04-15 PROCEDURE — 96375 TX/PRO/DX INJ NEW DRUG ADDON: CPT

## 2019-04-15 PROCEDURE — 25000003 PHARM REV CODE 250: Performed by: HOSPITALIST

## 2019-04-15 PROCEDURE — 63600175 PHARM REV CODE 636 W HCPCS: Performed by: FAMILY MEDICINE

## 2019-04-15 PROCEDURE — 81000 URINALYSIS NONAUTO W/SCOPE: CPT

## 2019-04-15 RX ORDER — AMOXICILLIN 250 MG
1 CAPSULE ORAL 2 TIMES DAILY
Status: DISCONTINUED | OUTPATIENT
Start: 2019-04-15 | End: 2019-04-17 | Stop reason: HOSPADM

## 2019-04-15 RX ORDER — AMLODIPINE BESYLATE 5 MG/1
10 TABLET ORAL DAILY
Status: DISCONTINUED | OUTPATIENT
Start: 2019-04-16 | End: 2019-04-17 | Stop reason: HOSPADM

## 2019-04-15 RX ORDER — METOPROLOL TARTRATE 1 MG/ML
10 INJECTION, SOLUTION INTRAVENOUS ONCE
Status: COMPLETED | OUTPATIENT
Start: 2019-04-15 | End: 2019-04-15

## 2019-04-15 RX ORDER — AMLODIPINE BESYLATE 5 MG/1
5 TABLET ORAL ONCE
Status: COMPLETED | OUTPATIENT
Start: 2019-04-15 | End: 2019-04-15

## 2019-04-15 RX ORDER — AMLODIPINE BESYLATE 5 MG/1
5 TABLET ORAL DAILY
Status: DISCONTINUED | OUTPATIENT
Start: 2019-04-15 | End: 2019-04-15

## 2019-04-15 RX ORDER — HYDRALAZINE HYDROCHLORIDE 20 MG/ML
10 INJECTION INTRAMUSCULAR; INTRAVENOUS ONCE
Status: COMPLETED | OUTPATIENT
Start: 2019-04-15 | End: 2019-04-15

## 2019-04-15 RX ORDER — HYDRALAZINE HYDROCHLORIDE 20 MG/ML
10 INJECTION INTRAMUSCULAR; INTRAVENOUS EVERY 6 HOURS PRN
Status: DISCONTINUED | OUTPATIENT
Start: 2019-04-15 | End: 2019-04-17 | Stop reason: HOSPADM

## 2019-04-15 RX ORDER — POLYETHYLENE GLYCOL 3350 17 G/17G
17 POWDER, FOR SOLUTION ORAL DAILY
Status: DISCONTINUED | OUTPATIENT
Start: 2019-04-15 | End: 2019-04-17 | Stop reason: HOSPADM

## 2019-04-15 RX ADMIN — PROPRANOLOL HYDROCHLORIDE 40 MG: 20 TABLET ORAL at 12:04

## 2019-04-15 RX ADMIN — LATANOPROST 1 DROP: 50 SOLUTION OPHTHALMIC at 08:04

## 2019-04-15 RX ADMIN — CETIRIZINE HYDROCHLORIDE 10 MG: 10 TABLET, FILM COATED ORAL at 08:04

## 2019-04-15 RX ADMIN — TIZANIDINE 4 MG: 4 TABLET ORAL at 09:04

## 2019-04-15 RX ADMIN — AMLODIPINE BESYLATE 5 MG: 5 TABLET ORAL at 12:04

## 2019-04-15 RX ADMIN — STANDARDIZED SENNA CONCENTRATE AND DOCUSATE SODIUM 1 TABLET: 8.6; 5 TABLET, FILM COATED ORAL at 08:04

## 2019-04-15 RX ADMIN — DIVALPROEX SODIUM 250 MG: 250 TABLET, DELAYED RELEASE ORAL at 08:04

## 2019-04-15 RX ADMIN — RISPERIDONE 0.5 MG: 0.25 TABLET, FILM COATED ORAL at 09:04

## 2019-04-15 RX ADMIN — TIZANIDINE 4 MG: 4 TABLET ORAL at 08:04

## 2019-04-15 RX ADMIN — METOPROLOL TARTRATE 10 MG: 1 INJECTION, SOLUTION INTRAVENOUS at 06:04

## 2019-04-15 RX ADMIN — DIVALPROEX SODIUM 250 MG: 250 TABLET, DELAYED RELEASE ORAL at 09:04

## 2019-04-15 RX ADMIN — MEMANTINE HYDROCHLORIDE 5 MG: 5 TABLET ORAL at 09:04

## 2019-04-15 RX ADMIN — POLYETHYLENE GLYCOL 3350 17 G: 17 POWDER, FOR SOLUTION ORAL at 12:04

## 2019-04-15 RX ADMIN — RISPERIDONE 0.5 MG: 0.25 TABLET, FILM COATED ORAL at 08:04

## 2019-04-15 RX ADMIN — FLUTICASONE PROPIONATE 50 MCG: 50 SPRAY, METERED NASAL at 08:04

## 2019-04-15 RX ADMIN — ASPIRIN 81 MG: 81 TABLET, COATED ORAL at 09:04

## 2019-04-15 RX ADMIN — HYDRALAZINE HYDROCHLORIDE 10 MG: 20 INJECTION INTRAMUSCULAR; INTRAVENOUS at 09:04

## 2019-04-15 RX ADMIN — AMLODIPINE BESYLATE 5 MG: 5 TABLET ORAL at 09:04

## 2019-04-15 RX ADMIN — Medication 100 MCG: at 09:04

## 2019-04-15 RX ADMIN — DOCUSATE SODIUM 100 MG: 100 CAPSULE, LIQUID FILLED ORAL at 12:04

## 2019-04-15 RX ADMIN — TRAZODONE HYDROCHLORIDE 25 MG: 50 TABLET ORAL at 08:04

## 2019-04-15 RX ADMIN — STANDARDIZED SENNA CONCENTRATE AND DOCUSATE SODIUM 1 TABLET: 8.6; 5 TABLET, FILM COATED ORAL at 12:04

## 2019-04-15 RX ADMIN — HYDROCODONE BITARTRATE AND ACETAMINOPHEN 1 TABLET: 5; 325 TABLET ORAL at 09:04

## 2019-04-15 NOTE — PLAN OF CARE
Pt is aggressive talker and difficult to get to answer questions and keep her focused.  She is from [facility] where she will return.  I provided a Bible for pt as requested.  This  put name on white board and explained blue discharge folder to patient. Discharge planning brochure and/or business card given to patient.  Patient verbalized understanding.       04/15/19 1157   Discharge Assessment   Assessment Type Discharge Planning Assessment   Confirmed/corrected address and phone number on facesheet? Yes   Assessment information obtained from? Patient   Communicated expected length of stay with patient/caregiver yes   Prior to hospitilization cognitive status: Alert/Oriented   Prior to hospitalization functional status: Wheelchair Bound   Current cognitive status: Alert/Oriented   Current Functional Status: Wheelchair Bound   Lives With facility resident   Able to Return to Prior Arrangements yes   Is patient able to care for self after discharge? Yes   Patient's perception of discharge disposition halfway care facility   Readmission Within the Last 30 Days no previous admission in last 30 days   Patient currently being followed by outpatient case management? No   Patient currently receives any other outside agency services? No   Equipment Currently Used at Home wheelchair   Do you have any problems affording any of your prescribed medications? No   Is the patient taking medications as prescribed? yes   Does the patient have transportation home? Yes   Transportation Anticipated other (see comments)   Discharge Plan A Return to nursing home   Discharge Plan B Return to Nursing Home   DME Needed Upon Discharge  none   Patient/Family in Agreement with Plan yes

## 2019-04-15 NOTE — ASSESSMENT & PLAN NOTE
-Continue home risperidone 0.5 mg BID, divalproex 250 mg BID, trazodone 25 mg HS.   -Check valproate level: slightly low at 41.9

## 2019-04-15 NOTE — PLAN OF CARE
VN rounds: VN cued into pt's room with pt's permission. Pt resting in bed. NAD noted. PCT in room. Allowed time for questions.  Pt stated she just had a large BM and feels relieved. Fall risk protocol discussed with pt. VN instructed pt to call for assistance. Pt aware and agreeable. Will cont to be available and intervene as needed.        04/15/19 0935   Type of Frequent Check   Type Patient Rounds;Other (see comments)  (VN rounds)   Safety/Activity   Patient Rounds visualized patient;call light in patient/parent reach   Safety Promotion/Fall Prevention Fall Risk reviewed with patient/family;assistive device/personal item within reach;instructed to call staff for mobility;side rails raised x 3   Positioning   Body Position sitting up in bed   Pain/Comfort/Sleep   Preferred Pain Scale word (verbal rating pain scale)   Pain Rating: Rest 0 - no pain   Sleep/Rest/Relaxation awake;no problem identified   Assessments (Pre/Post)   Level of Consciousness (AVPU) alert

## 2019-04-15 NOTE — PROGRESS NOTES
[facility]  Progress Note    Patient Name: Austyn Pedersen  MRN: [id number]  Patient Class: OP- Observation   Admission Date: 4/13/2019  Length of Stay: 0 days  Attending Physician: Kiana Wallis*  Primary Care Provider: [facility] - drparikh        Subjective:     Principal Problem:Syncope    HPI:  Austyn Pedersen is a 78 y.o. white woman with hypertension, chronic diastolic heart failure, hyperlipidemia, glaucoma, bipolar disorder, dementia, chronic pain, overactive bladder. She has resided at [facility] in [place], [place] since 12/2/15. She is . She is wheelchair dependent. Her primary care physician is Dr. Richard King.   On Saturday 4/13/19, she was eating lunch in the cafeteria and was about to eat a [place] bar when she spontaneously passed out without any preceding symptoms. She subsequently had slurred speech so was taken to [facility]. EKG showed normal sinus rhythm. Cardiac enzymes were not checked. Head CT showed nothing acute. Chest X-ray showed nonspecific coarse interstitial attenuation bilaterally grossly similar to prior chest X-ray on 10/20/18. Her blood pressure increased from 145/67 to 164/78 when standing. She was admitted to [facility] for further evaluation. On physical examination, she had leg edema but noted that she had wrist edema as well, which she does not usually get.    Hospital Course:  Echo stable. UA negative.    Interval History: awake and alert. FU UA.     Review of Systems   Constitutional: Negative for chills and fever.   HENT: Negative for trouble swallowing and voice change.    Eyes: Negative for pain and redness.   Respiratory: Negative for cough and shortness of breath.    Cardiovascular: Positive for leg swelling. Negative for chest pain.   Gastrointestinal: Negative for nausea and vomiting.   Genitourinary: Negative for difficulty urinating and  dysuria.   Musculoskeletal: Negative for neck pain and neck stiffness.   Skin: Negative for rash and wound.   Neurological: Positive for syncope. Negative for seizures.     Objective:     Vital Signs (Most Recent):  Temp: 98.1 °F (36.7 °C) (04/15/19 0722)  Pulse: 68 (04/15/19 0748)  Resp: 17 (04/15/19 0722)  BP: (!) 207/98 (04/15/19 0722)  SpO2: (!) 94 % (04/15/19 0805) Vital Signs (24h Range):  Temp:  [97.5 °F (36.4 °C)-98.3 °F (36.8 °C)] 98.1 °F (36.7 °C)  Pulse:  [68-77] 68  Resp:  [14-17] 17  SpO2:  [94 %] 94 %  BP: (141-207)/(67-98) 207/98     Weight: 89 kg (196 lb 3.4 oz)  Body mass index is 31.67 kg/m².    Intake/Output Summary (Last 24 hours) at 4/15/2019 0812  Last data filed at 4/15/2019 0608  Gross per 24 hour   Intake --   Output 2553 ml   Net -2553 ml      Physical Exam   Constitutional: She is oriented to person, place, and time. She appears well-developed. No distress.   HENT:   Head: Normocephalic and atraumatic.   Eyes: No scleral icterus.   Neck: Neck supple. No tracheal deviation present.   Cardiovascular: Normal rate and regular rhythm.   Pulmonary/Chest: Effort normal. No respiratory distress.   Musculoskeletal: She exhibits edema. She exhibits no tenderness.   Neurological: She is alert and oriented to person, place, and time.   Skin: Skin is warm and dry.   Psychiatric: She has a normal mood and affect.   Nursing note and vitals reviewed.       Significant Labs:   CBC:   Recent Labs   Lab 04/13/19  1409 04/14/19  1134   WBC 7.40 5.30   HGB 11.3* 11.6*   HCT 36.5* 37.3    216     CMP:   Recent Labs   Lab 04/13/19  1409 04/14/19  1134    141   K 4.0 3.8    103   CO2 28 29   * 102   BUN 16 14   CREATININE 1.0 0.8   CALCIUM 9.5 9.4   PROT 7.0  --    ALBUMIN 3.3*  --    BILITOT 0.6  --    ALKPHOS 97  --    AST 16  --    ALT 17  --    ANIONGAP 10 9   EGFRNONAA 55* >60     Coagulation:   Recent Labs   Lab 04/13/19  1409   INR 1.0     Lipase: No results for input(s): LIPASE  in the last 48 hours.  Urine Culture: No results for input(s): LABURIN in the last 48 hours.  Urine Studies: No results for input(s): COLORU, APPEARANCEUA, PHUR, SPECGRAV, PROTEINUA, GLUCUA, KETONESU, BILIRUBINUA, OCCULTUA, NITRITE, UROBILINOGEN, LEUKOCYTESUR, RBCUA, WBCUA, BACTERIA, SQUAMEPITHEL, HYALINECASTS in the last 48 hours.    Invalid input(s): WRIGHTSUR    Significant Imaging: I have reviewed all pertinent imaging results/findings within the past 24 hours.    Assessment/Plan:      * Syncope  No orthostasis. Sudden syncope without preceding lightheadedness concerning for cardiac etiology. Hx recurrent UTI  -Telemetry: no issues  -Echocardiogram: stable  -UA negative    Essential hypertension  -207  -Continue home amlodipine and propranolol.    Bipolar affective disorder  -Continue home risperidone 0.5 mg BID, divalproex 250 mg BID, trazodone 25 mg HS.   -Check valproate level: slightly low at 41.9    Chronic diastolic heart failure  Has more peripheral edema than she is used to.   -Check BNP: 113  -Echocardiogram: stable    Nursing home resident  Wheelchair dependent  Return to [facility] on discharge.    Glaucoma  -Continue home latanoprost eye drops. Carteolol eye drops are not in hospital formulary.    Constipation due to opioid therapy  -Start senna-docusate and polyethylene glycol    Chronic pain  -Continue home tizanidine, hydrocodone-acetaminophen.    Dementia without behavioral disturbance  Continue home memantine.      VTE Risk Mitigation (From admission, onward)        Ordered     IP VTE HIGH RISK PATIENT  Once      04/13/19 2019     Place sequential compression device  Until discontinued      04/13/19 2019     Place MEME hose  Until discontinued      04/13/19 2019              Piper Barraza PA-C  Department of Hospital Medicine   [facility]

## 2019-04-15 NOTE — SUBJECTIVE & OBJECTIVE
Interval History: awake and alert. FU UA.     Review of Systems   Constitutional: Negative for chills and fever.   HENT: Negative for trouble swallowing and voice change.    Eyes: Negative for pain and redness.   Respiratory: Negative for cough and shortness of breath.    Cardiovascular: Positive for leg swelling. Negative for chest pain.   Gastrointestinal: Negative for nausea and vomiting.   Genitourinary: Negative for difficulty urinating and dysuria.   Musculoskeletal: Negative for neck pain and neck stiffness.   Skin: Negative for rash and wound.   Neurological: Positive for syncope. Negative for seizures.     Objective:     Vital Signs (Most Recent):  Temp: 98.1 °F (36.7 °C) (04/15/19 0722)  Pulse: 68 (04/15/19 0748)  Resp: 17 (04/15/19 0722)  BP: (!) 207/98 (04/15/19 0722)  SpO2: (!) 94 % (04/15/19 0805) Vital Signs (24h Range):  Temp:  [97.5 °F (36.4 °C)-98.3 °F (36.8 °C)] 98.1 °F (36.7 °C)  Pulse:  [68-77] 68  Resp:  [14-17] 17  SpO2:  [94 %] 94 %  BP: (141-207)/(67-98) 207/98     Weight: 89 kg (196 lb 3.4 oz)  Body mass index is 31.67 kg/m².    Intake/Output Summary (Last 24 hours) at 4/15/2019 0812  Last data filed at 4/15/2019 0608  Gross per 24 hour   Intake --   Output 2553 ml   Net -2553 ml      Physical Exam   Constitutional: She is oriented to person, place, and time. She appears well-developed. No distress.   HENT:   Head: Normocephalic and atraumatic.   Eyes: No scleral icterus.   Neck: Neck supple. No tracheal deviation present.   Cardiovascular: Normal rate and regular rhythm.   Pulmonary/Chest: Effort normal. No respiratory distress.   Musculoskeletal: She exhibits edema. She exhibits no tenderness.   Neurological: She is alert and oriented to person, place, and time.   Skin: Skin is warm and dry.   Psychiatric: She has a normal mood and affect.   Nursing note and vitals reviewed.       Significant Labs:   CBC:   Recent Labs   Lab 04/13/19  1409 04/14/19  1134   WBC 7.40 5.30   HGB 11.3* 11.6*    HCT 36.5* 37.3    216     CMP:   Recent Labs   Lab 04/13/19  1409 04/14/19  1134    141   K 4.0 3.8    103   CO2 28 29   * 102   BUN 16 14   CREATININE 1.0 0.8   CALCIUM 9.5 9.4   PROT 7.0  --    ALBUMIN 3.3*  --    BILITOT 0.6  --    ALKPHOS 97  --    AST 16  --    ALT 17  --    ANIONGAP 10 9   EGFRNONAA 55* >60     Coagulation:   Recent Labs   Lab 04/13/19  1409   INR 1.0     Lipase: No results for input(s): LIPASE in the last 48 hours.  Urine Culture: No results for input(s): LABURIN in the last 48 hours.  Urine Studies: No results for input(s): COLORU, APPEARANCEUA, PHUR, SPECGRAV, PROTEINUA, GLUCUA, KETONESU, BILIRUBINUA, OCCULTUA, NITRITE, UROBILINOGEN, LEUKOCYTESUR, RBCUA, WBCUA, BACTERIA, SQUAMEPITHEL, HYALINECASTS in the last 48 hours.    Invalid input(s): JULEE    Significant Imaging: I have reviewed all pertinent imaging results/findings within the past 24 hours.

## 2019-04-15 NOTE — PLAN OF CARE
Problem: Syncope  Goal: Absence of Syncopal Symptoms  Outcome: Ongoing (interventions implemented as appropriate)  Reviewed Plan of Care with PT.  No c/o dizziness or HA.  No fall or injuries.  PT has called when needed assistance.  NAD noted.  Will continue to monitor PTs care.

## 2019-04-15 NOTE — HOSPITAL COURSE
Echo stable. UA negative.  4/16/19 BP is elevated, adjusted medications, monitor, patient complaining of constipation.  4/17/19 Currently no complaints, KUB showed no obstruction, BP trending down.  Patient ready for discharge back to [facility], case management following.

## 2019-04-15 NOTE — PLAN OF CARE
Problem: Adult Inpatient Plan of Care  Goal: Plan of Care Review  POC reviewed with patient, patient verbalized understanding. Administered meds as per md ordes. PRN stool softner given as per patient request. No noted distress during shift, Safety measures in place, bed in lowest position , call bell in reach, hob elevated, side rails up x 2 will continue to monitor patient

## 2019-04-15 NOTE — ASSESSMENT & PLAN NOTE
No orthostasis. Sudden syncope without preceding lightheadedness concerning for cardiac etiology. Hx recurrent UTI  -Telemetry: no issues  -Echocardiogram: stable  -UA negative

## 2019-04-16 PROBLEM — R55 SYNCOPE: Status: ACTIVE | Noted: 2019-04-16

## 2019-04-16 PROBLEM — R55 SYNCOPE: Status: RESOLVED | Noted: 2018-10-20 | Resolved: 2019-04-16

## 2019-04-16 LAB
ANION GAP SERPL CALC-SCNC: 9 MMOL/L (ref 8–16)
BASOPHILS # BLD AUTO: 0.02 K/UL (ref 0–0.2)
BASOPHILS NFR BLD: 0.3 % (ref 0–1.9)
BUN SERPL-MCNC: 18 MG/DL (ref 8–23)
CALCIUM SERPL-MCNC: 10.6 MG/DL (ref 8.7–10.5)
CHLORIDE SERPL-SCNC: 101 MMOL/L (ref 95–110)
CO2 SERPL-SCNC: 30 MMOL/L (ref 23–29)
CREAT SERPL-MCNC: 0.8 MG/DL (ref 0.5–1.4)
DIFFERENTIAL METHOD: ABNORMAL
EOSINOPHIL # BLD AUTO: 0.1 K/UL (ref 0–0.5)
EOSINOPHIL NFR BLD: 2.2 % (ref 0–8)
ERYTHROCYTE [DISTWIDTH] IN BLOOD BY AUTOMATED COUNT: 15.1 % (ref 11.5–14.5)
EST. GFR  (AFRICAN AMERICAN): >60 ML/MIN/1.73 M^2
EST. GFR  (NON AFRICAN AMERICAN): >60 ML/MIN/1.73 M^2
GLUCOSE SERPL-MCNC: 99 MG/DL (ref 70–110)
HCT VFR BLD AUTO: 43.1 % (ref 37–48.5)
HGB BLD-MCNC: 13.6 G/DL (ref 12–16)
LYMPHOCYTES # BLD AUTO: 1.9 K/UL (ref 1–4.8)
LYMPHOCYTES NFR BLD: 32.2 % (ref 18–48)
MAGNESIUM SERPL-MCNC: 1.9 MG/DL (ref 1.6–2.6)
MCH RBC QN AUTO: 28 PG (ref 27–31)
MCHC RBC AUTO-ENTMCNC: 31.6 G/DL (ref 32–36)
MCV RBC AUTO: 89 FL (ref 82–98)
MONOCYTES # BLD AUTO: 0.6 K/UL (ref 0.3–1)
MONOCYTES NFR BLD: 9.5 % (ref 4–15)
NEUTROPHILS # BLD AUTO: 3.3 K/UL (ref 1.8–7.7)
NEUTROPHILS NFR BLD: 55.5 % (ref 38–73)
PHOSPHATE SERPL-MCNC: 3.3 MG/DL (ref 2.7–4.5)
PLATELET # BLD AUTO: 275 K/UL (ref 150–350)
PMV BLD AUTO: 10.8 FL (ref 9.2–12.9)
POTASSIUM SERPL-SCNC: 4 MMOL/L (ref 3.5–5.1)
RBC # BLD AUTO: 4.86 M/UL (ref 4–5.4)
SODIUM SERPL-SCNC: 140 MMOL/L (ref 136–145)
WBC # BLD AUTO: 6 K/UL (ref 3.9–12.7)

## 2019-04-16 PROCEDURE — 36415 COLL VENOUS BLD VENIPUNCTURE: CPT

## 2019-04-16 PROCEDURE — 63600175 PHARM REV CODE 636 W HCPCS: Performed by: HOSPITALIST

## 2019-04-16 PROCEDURE — 94761 N-INVAS EAR/PLS OXIMETRY MLT: CPT

## 2019-04-16 PROCEDURE — 25000003 PHARM REV CODE 250: Performed by: PHYSICIAN ASSISTANT

## 2019-04-16 PROCEDURE — 96376 TX/PRO/DX INJ SAME DRUG ADON: CPT

## 2019-04-16 PROCEDURE — 25000003 PHARM REV CODE 250: Performed by: FAMILY MEDICINE

## 2019-04-16 PROCEDURE — 80048 BASIC METABOLIC PNL TOTAL CA: CPT

## 2019-04-16 PROCEDURE — 25000003 PHARM REV CODE 250: Performed by: HOSPITALIST

## 2019-04-16 PROCEDURE — 84100 ASSAY OF PHOSPHORUS: CPT

## 2019-04-16 PROCEDURE — 85025 COMPLETE CBC W/AUTO DIFF WBC: CPT

## 2019-04-16 PROCEDURE — 25000003 PHARM REV CODE 250: Performed by: NURSE PRACTITIONER

## 2019-04-16 PROCEDURE — 11000001 HC ACUTE MED/SURG PRIVATE ROOM

## 2019-04-16 PROCEDURE — 83735 ASSAY OF MAGNESIUM: CPT

## 2019-04-16 RX ORDER — HYDRALAZINE HYDROCHLORIDE 10 MG/1
10 TABLET, FILM COATED ORAL EVERY 8 HOURS
Status: DISCONTINUED | OUTPATIENT
Start: 2019-04-16 | End: 2019-04-17

## 2019-04-16 RX ORDER — BISACODYL 5 MG
10 TABLET, DELAYED RELEASE (ENTERIC COATED) ORAL ONCE
Status: COMPLETED | OUTPATIENT
Start: 2019-04-16 | End: 2019-04-16

## 2019-04-16 RX ORDER — HYDRALAZINE HYDROCHLORIDE 20 MG/ML
10 INJECTION INTRAMUSCULAR; INTRAVENOUS ONCE
Status: COMPLETED | OUTPATIENT
Start: 2019-04-16 | End: 2019-04-16

## 2019-04-16 RX ADMIN — MEMANTINE HYDROCHLORIDE 5 MG: 5 TABLET ORAL at 09:04

## 2019-04-16 RX ADMIN — Medication 100 MCG: at 09:04

## 2019-04-16 RX ADMIN — BISACODYL 10 MG: 5 TABLET, DELAYED RELEASE ORAL at 03:04

## 2019-04-16 RX ADMIN — STANDARDIZED SENNA CONCENTRATE AND DOCUSATE SODIUM 1 TABLET: 8.6; 5 TABLET, FILM COATED ORAL at 09:04

## 2019-04-16 RX ADMIN — TIZANIDINE 4 MG: 4 TABLET ORAL at 09:04

## 2019-04-16 RX ADMIN — TIZANIDINE 4 MG: 4 TABLET ORAL at 03:04

## 2019-04-16 RX ADMIN — CETIRIZINE HYDROCHLORIDE 10 MG: 10 TABLET, FILM COATED ORAL at 09:04

## 2019-04-16 RX ADMIN — HYDROCODONE BITARTRATE AND ACETAMINOPHEN 1 TABLET: 5; 325 TABLET ORAL at 10:04

## 2019-04-16 RX ADMIN — RISPERIDONE 0.5 MG: 0.25 TABLET, FILM COATED ORAL at 09:04

## 2019-04-16 RX ADMIN — PROPRANOLOL HYDROCHLORIDE 40 MG: 20 TABLET ORAL at 09:04

## 2019-04-16 RX ADMIN — AMLODIPINE BESYLATE 10 MG: 5 TABLET ORAL at 09:04

## 2019-04-16 RX ADMIN — LATANOPROST 1 DROP: 50 SOLUTION OPHTHALMIC at 09:04

## 2019-04-16 RX ADMIN — HYDRALAZINE HYDROCHLORIDE 10 MG: 10 TABLET ORAL at 03:04

## 2019-04-16 RX ADMIN — FLUTICASONE PROPIONATE 50 MCG: 50 SPRAY, METERED NASAL at 09:04

## 2019-04-16 RX ADMIN — TRAZODONE HYDROCHLORIDE 25 MG: 50 TABLET ORAL at 09:04

## 2019-04-16 RX ADMIN — DIVALPROEX SODIUM 250 MG: 250 TABLET, DELAYED RELEASE ORAL at 09:04

## 2019-04-16 RX ADMIN — ASPIRIN 81 MG: 81 TABLET, COATED ORAL at 09:04

## 2019-04-16 RX ADMIN — POLYETHYLENE GLYCOL 3350 17 G: 17 POWDER, FOR SOLUTION ORAL at 09:04

## 2019-04-16 RX ADMIN — HYDRALAZINE HYDROCHLORIDE 10 MG: 10 TABLET ORAL at 09:04

## 2019-04-16 RX ADMIN — HYDRALAZINE HYDROCHLORIDE: 20 INJECTION INTRAMUSCULAR; INTRAVENOUS at 09:04

## 2019-04-16 NOTE — SUBJECTIVE & OBJECTIVE
Interval History: Complaining of constipation.    Review of Systems   Constitutional: Negative for chills, fatigue and fever.   HENT: Negative for congestion, postnasal drip and rhinorrhea.    Eyes: Negative for visual disturbance.   Respiratory: Negative for cough, chest tightness and shortness of breath.    Cardiovascular: Negative for chest pain, palpitations and leg swelling.   Gastrointestinal: Negative for abdominal distention.   Genitourinary: Negative for difficulty urinating.   Musculoskeletal: Negative for arthralgias and myalgias.   Neurological: Negative for weakness and light-headedness.   Hematological: Does not bruise/bleed easily.   Psychiatric/Behavioral: Negative for agitation.     Objective:     Vital Signs (Most Recent):  Temp: 96.6 °F (35.9 °C) (04/16/19 1134)  Pulse: 75 (04/16/19 1134)  Resp: 17 (04/16/19 1134)  BP: (!) 143/71 (04/16/19 1134)  SpO2: (!) 92 % (04/16/19 1143) Vital Signs (24h Range):  Temp:  [96.6 °F (35.9 °C)-99.4 °F (37.4 °C)] 96.6 °F (35.9 °C)  Pulse:  [73-85] 75  Resp:  [17-19] 17  SpO2:  [92 %-95 %] 92 %  BP: (126-225)/() 143/71     Weight: 87.4 kg (192 lb 10.9 oz)  Body mass index is 31.1 kg/m².    Intake/Output Summary (Last 24 hours) at 4/16/2019 1345  Last data filed at 4/16/2019 1000  Gross per 24 hour   Intake 305 ml   Output 2000 ml   Net -1695 ml      Physical Exam   Constitutional: She is oriented to person, place, and time. She appears well-developed and well-nourished.   HENT:   Head: Normocephalic and atraumatic.   Eyes: Pupils are equal, round, and reactive to light.   Neck: Normal range of motion.   Cardiovascular: Normal rate.   Pulmonary/Chest: Effort normal.   Abdominal: Soft. Bowel sounds are normal.   Musculoskeletal: Normal range of motion.   Neurological: She is alert and oriented to person, place, and time.   Skin: Skin is warm and dry.   Psychiatric: She has a normal mood and affect.       Significant Labs:   BMP:   Recent Labs   Lab  04/16/19  0928   GLU 99      K 4.0      CO2 30*   BUN 18   CREATININE 0.8   CALCIUM 10.6*   MG 1.9     CBC:   Recent Labs   Lab 04/16/19 0928   WBC 6.00   HGB 13.6   HCT 43.1        Magnesium:   Recent Labs   Lab 04/16/19 0928   MG 1.9       Significant Imaging: I have reviewed all pertinent imaging results/findings within the past 24 hours.

## 2019-04-16 NOTE — ASSESSMENT & PLAN NOTE
No complaints of chest pain on SOB  -225  -Increased amlodipine to 10mg daily, continue propanolol  -Start Hydralazine 10mg po q8 hours  -Monitor

## 2019-04-16 NOTE — PROGRESS NOTES
[facility]  Progress Note    Patient Name: Austyn Pedersen  MRN: [id number]  Patient Class: OP- Observation   Admission Date: 4/13/2019  Length of Stay: 0 days  Attending Physician: Jonatan Gomes DO  Primary Care Provider: [facility] - drparikh        Subjective:     Principal Problem:Syncope    HPI:  Austyn Pedersen is a 78 y.o. white woman with hypertension, chronic diastolic heart failure, hyperlipidemia, glaucoma, bipolar disorder, dementia, chronic pain, overactive bladder. She has resided at [facility] in [place], [place] since 12/2/15. She is . She is wheelchair dependent. Her primary care physician is Dr. Richard King.   On Saturday 4/13/19, she was eating lunch in the cafeteria and was about to eat a [employer] bar when she spontaneously passed out without any preceding symptoms. She subsequently had slurred speech so was taken to [facility] Emergency [facility]. EKG showed normal sinus rhythm. Cardiac enzymes were not checked. Head CT showed nothing acute. Chest X-ray showed nonspecific coarse interstitial attenuation bilaterally grossly similar to prior chest X-ray on 10/20/18. Her blood pressure increased from 145/67 to 164/78 when standing. She was admitted to [facility] for further evaluation. On physical examination, she had leg edema but noted that she had wrist edema as well, which she does not usually get.    Hospital Course:  Echo stable. UA negative.  4/16/19 BP is elevated, adjusted medications, monitor, patient complaining of constipation, pending KUB.    Interval History: Complaining of constipation.    Review of Systems   Constitutional: Negative for chills, fatigue and fever.   HENT: Negative for congestion, postnasal drip and rhinorrhea.    Eyes: Negative for visual disturbance.   Respiratory: Negative for cough, chest tightness and shortness of breath.    Cardiovascular: Negative for chest pain,  palpitations and leg swelling.   Gastrointestinal: Negative for abdominal distention.   Genitourinary: Negative for difficulty urinating.   Musculoskeletal: Negative for arthralgias and myalgias.   Neurological: Negative for weakness and light-headedness.   Hematological: Does not bruise/bleed easily.   Psychiatric/Behavioral: Negative for agitation.     Objective:     Vital Signs (Most Recent):  Temp: 96.6 °F (35.9 °C) (04/16/19 1134)  Pulse: 75 (04/16/19 1134)  Resp: 17 (04/16/19 1134)  BP: (!) 143/71 (04/16/19 1134)  SpO2: (!) 92 % (04/16/19 1143) Vital Signs (24h Range):  Temp:  [96.6 °F (35.9 °C)-99.4 °F (37.4 °C)] 96.6 °F (35.9 °C)  Pulse:  [73-85] 75  Resp:  [17-19] 17  SpO2:  [92 %-95 %] 92 %  BP: (126-225)/() 143/71     Weight: 87.4 kg (192 lb 10.9 oz)  Body mass index is 31.1 kg/m².    Intake/Output Summary (Last 24 hours) at 4/16/2019 1345  Last data filed at 4/16/2019 1000  Gross per 24 hour   Intake 305 ml   Output 2000 ml   Net -1695 ml      Physical Exam   Constitutional: She is oriented to person, place, and time. She appears well-developed and well-nourished.   HENT:   Head: Normocephalic and atraumatic.   Eyes: Pupils are equal, round, and reactive to light.   Neck: Normal range of motion.   Cardiovascular: Normal rate.   Pulmonary/Chest: Effort normal.   Abdominal: Soft. Bowel sounds are normal.   Musculoskeletal: Normal range of motion.   Neurological: She is alert and oriented to person, place, and time.   Skin: Skin is warm and dry.   Psychiatric: She has a normal mood and affect.       Significant Labs:   BMP:   Recent Labs   Lab 04/16/19  0928   GLU 99      K 4.0      CO2 30*   BUN 18   CREATININE 0.8   CALCIUM 10.6*   MG 1.9     CBC:   Recent Labs   Lab 04/16/19  0928   WBC 6.00   HGB 13.6   HCT 43.1        Magnesium:   Recent Labs   Lab 04/16/19  0928   MG 1.9       Significant Imaging: I have reviewed all pertinent imaging results/findings within the past 24  hours.    Assessment/Plan:      Glaucoma  -Continue home latanoprost eye drops. Carteolol eye drops are not in hospital formulary.    Chronic diastolic heart failure  Denies SOB on Chest pain  -Check BNP: 113  -Echocardiogram: stable    Constipation due to opioid therapy  Patient complaining of abdominal fullness and constipation, was started senna-docusate and polyethylene glycol on 4/15/19  -ordered dulcolax x1 dose  -ordered KU    Nursing home resident  Wheelchair dependent  Return to [facility] on discharge.    Chronic pain  -Continue home tizanidine, hydrocodone-acetaminophen.    Essential hypertension  No complaints of chest pain on SOB  -225  -Increased amlodipine to 10mg daily, continue propanolol  -Start Hydralazine 10mg po q8 hours  -Monitor    Dementia without behavioral disturbance  Continue home memantine.    Bipolar affective disorder  -Continue home risperidone 0.5 mg BID, divalproex 250 mg BID, trazodone 25 mg HS.   -Check valproate level: slightly low at 41.9      VTE Risk Mitigation (From admission, onward)        Ordered     IP VTE HIGH RISK PATIENT  Once      04/13/19 2019     Place sequential compression device  Until discontinued      04/13/19 2019     Place MEME hose  Until discontinued      04/13/19 2019              Carla Philip NP  Department of Hospital Medicine   [facility]

## 2019-04-16 NOTE — ASSESSMENT & PLAN NOTE
Patient complaining of abdominal fullness and constipation, was started senna-docusate and polyethylene glycol on 4/15/19  -ordered dulcolax x1 dose  -ordered KUB

## 2019-04-16 NOTE — PLAN OF CARE
Problem: Syncope  Goal: Absence of Syncopal Symptoms  Outcome: Ongoing (interventions implemented as appropriate)  Reviewed Plan of care with PT.  PT verbalized understanding.  No c/o dizziness or HA.  PT was given BP meds for elevated BP.  BP has been stabalized.  Fall precaution maintained.  Will continue to monitor PTs care.

## 2019-04-16 NOTE — PLAN OF CARE
Problem: Adult Inpatient Plan of Care  Goal: Plan of Care Review     04/16/19 0003 04/16/19 0244   Plan of Care Review   Plan of Care Reviewed With patient  --    Progress  --  no change   POC reviewed with patient, patient verbalized understanding. Administered meds as per md ordes. PRN stool softner given as per patient request. No noted distress during shift, Safety measures in place, bed in lowest position , call bell in reach, hob elevated, side rails up x 2 will continue to monitor patient

## 2019-04-17 LAB
ANION GAP SERPL CALC-SCNC: 9 MMOL/L (ref 8–16)
BASOPHILS # BLD AUTO: 0.02 K/UL (ref 0–0.2)
BASOPHILS NFR BLD: 0.3 % (ref 0–1.9)
BUN SERPL-MCNC: 18 MG/DL (ref 8–23)
CALCIUM SERPL-MCNC: 10.1 MG/DL (ref 8.7–10.5)
CHLORIDE SERPL-SCNC: 100 MMOL/L (ref 95–110)
CO2 SERPL-SCNC: 31 MMOL/L (ref 23–29)
CREAT SERPL-MCNC: 0.9 MG/DL (ref 0.5–1.4)
DIFFERENTIAL METHOD: ABNORMAL
EOSINOPHIL # BLD AUTO: 0.2 K/UL (ref 0–0.5)
EOSINOPHIL NFR BLD: 3.1 % (ref 0–8)
ERYTHROCYTE [DISTWIDTH] IN BLOOD BY AUTOMATED COUNT: 15.5 % (ref 11.5–14.5)
EST. GFR  (AFRICAN AMERICAN): >60 ML/MIN/1.73 M^2
EST. GFR  (NON AFRICAN AMERICAN): >60 ML/MIN/1.73 M^2
GLUCOSE SERPL-MCNC: 93 MG/DL (ref 70–110)
HCT VFR BLD AUTO: 41.9 % (ref 37–48.5)
HGB BLD-MCNC: 13.2 G/DL (ref 12–16)
LYMPHOCYTES # BLD AUTO: 2.1 K/UL (ref 1–4.8)
LYMPHOCYTES NFR BLD: 35.2 % (ref 18–48)
MAGNESIUM SERPL-MCNC: 2 MG/DL (ref 1.6–2.6)
MCH RBC QN AUTO: 28.3 PG (ref 27–31)
MCHC RBC AUTO-ENTMCNC: 31.5 G/DL (ref 32–36)
MCV RBC AUTO: 90 FL (ref 82–98)
MONOCYTES # BLD AUTO: 0.7 K/UL (ref 0.3–1)
MONOCYTES NFR BLD: 12.7 % (ref 4–15)
NEUTROPHILS # BLD AUTO: 2.8 K/UL (ref 1.8–7.7)
NEUTROPHILS NFR BLD: 48.5 % (ref 38–73)
PHOSPHATE SERPL-MCNC: 3.5 MG/DL (ref 2.7–4.5)
PLATELET # BLD AUTO: 241 K/UL (ref 150–350)
PMV BLD AUTO: 10.6 FL (ref 9.2–12.9)
POTASSIUM SERPL-SCNC: 4.1 MMOL/L (ref 3.5–5.1)
RBC # BLD AUTO: 4.66 M/UL (ref 4–5.4)
SODIUM SERPL-SCNC: 140 MMOL/L (ref 136–145)
WBC # BLD AUTO: 5.82 K/UL (ref 3.9–12.7)

## 2019-04-17 PROCEDURE — 83735 ASSAY OF MAGNESIUM: CPT

## 2019-04-17 PROCEDURE — 36415 COLL VENOUS BLD VENIPUNCTURE: CPT

## 2019-04-17 PROCEDURE — 63600175 PHARM REV CODE 636 W HCPCS: Performed by: HOSPITALIST

## 2019-04-17 PROCEDURE — 84100 ASSAY OF PHOSPHORUS: CPT

## 2019-04-17 PROCEDURE — 94761 N-INVAS EAR/PLS OXIMETRY MLT: CPT

## 2019-04-17 PROCEDURE — 25000003 PHARM REV CODE 250: Performed by: HOSPITALIST

## 2019-04-17 PROCEDURE — 25000003 PHARM REV CODE 250: Performed by: PHYSICIAN ASSISTANT

## 2019-04-17 PROCEDURE — 80048 BASIC METABOLIC PNL TOTAL CA: CPT

## 2019-04-17 PROCEDURE — 85025 COMPLETE CBC W/AUTO DIFF WBC: CPT

## 2019-04-17 PROCEDURE — 25000003 PHARM REV CODE 250: Performed by: FAMILY MEDICINE

## 2019-04-17 RX ORDER — HYDRALAZINE HYDROCHLORIDE 25 MG/1
25 TABLET, FILM COATED ORAL EVERY 8 HOURS
Status: DISCONTINUED | OUTPATIENT
Start: 2019-04-17 | End: 2019-04-17

## 2019-04-17 RX ORDER — HYDRALAZINE HYDROCHLORIDE 25 MG/1
25 TABLET, FILM COATED ORAL EVERY 12 HOURS
Qty: 60 TABLET | Refills: 11
Start: 2019-04-17 | End: 2022-01-01

## 2019-04-17 RX ORDER — AMLODIPINE BESYLATE 10 MG/1
10 TABLET ORAL DAILY
Qty: 30 TABLET | Refills: 11 | Status: ON HOLD
Start: 2019-04-18 | End: 2022-01-01 | Stop reason: CLARIF

## 2019-04-17 RX ORDER — HYDRALAZINE HYDROCHLORIDE 25 MG/1
25 TABLET, FILM COATED ORAL EVERY 12 HOURS
Status: DISCONTINUED | OUTPATIENT
Start: 2019-04-17 | End: 2019-04-17 | Stop reason: HOSPADM

## 2019-04-17 RX ADMIN — POLYETHYLENE GLYCOL 3350 17 G: 17 POWDER, FOR SOLUTION ORAL at 08:04

## 2019-04-17 RX ADMIN — STANDARDIZED SENNA CONCENTRATE AND DOCUSATE SODIUM 1 TABLET: 8.6; 5 TABLET, FILM COATED ORAL at 08:04

## 2019-04-17 RX ADMIN — DIVALPROEX SODIUM 250 MG: 250 TABLET, DELAYED RELEASE ORAL at 08:04

## 2019-04-17 RX ADMIN — TIZANIDINE 4 MG: 4 TABLET ORAL at 03:04

## 2019-04-17 RX ADMIN — HYDROCODONE BITARTRATE AND ACETAMINOPHEN 1 TABLET: 5; 325 TABLET ORAL at 05:04

## 2019-04-17 RX ADMIN — RISPERIDONE 0.5 MG: 0.25 TABLET, FILM COATED ORAL at 08:04

## 2019-04-17 RX ADMIN — PROPRANOLOL HYDROCHLORIDE 40 MG: 20 TABLET ORAL at 08:04

## 2019-04-17 RX ADMIN — ONDANSETRON 4 MG: 2 INJECTION INTRAMUSCULAR; INTRAVENOUS at 11:04

## 2019-04-17 RX ADMIN — HYDRALAZINE HYDROCHLORIDE 10 MG: 10 TABLET ORAL at 05:04

## 2019-04-17 RX ADMIN — MEMANTINE HYDROCHLORIDE 5 MG: 5 TABLET ORAL at 08:04

## 2019-04-17 RX ADMIN — Medication 100 MCG: at 08:04

## 2019-04-17 RX ADMIN — TIZANIDINE 4 MG: 4 TABLET ORAL at 08:04

## 2019-04-17 RX ADMIN — ASPIRIN 81 MG: 81 TABLET, COATED ORAL at 08:04

## 2019-04-17 RX ADMIN — AMLODIPINE BESYLATE 10 MG: 5 TABLET ORAL at 08:04

## 2019-04-17 NOTE — DISCHARGE SUMMARY
[facility] Medicine  Discharge Summary      Patient Name: Austyn Pedersen  MRN: [id number]  Admission Date: 4/13/2019  Hospital Length of Stay: 1 days  Discharge Date and Time:  04/17/2019 4:58 PM  Attending Physician: Jonatan Gomes DO   Discharging Provider: Carla Philip NP  Primary Care Provider: Richard King MD [facility]      HPI:   Austyn Pedersen is a 78 y.o. white woman with hypertension, chronic diastolic heart failure, hyperlipidemia, glaucoma, bipolar disorder, dementia, chronic pain, overactive bladder. She has resided at [facility] in [place], [place] since 12/2/15. She is . She is wheelchair dependent. Her primary care physician is Dr. Richard King.   On Saturday 4/13/19, she was eating lunch in the cafeteria and was about to eat a [employer] bar when she spontaneously passed out without any preceding symptoms. She subsequently had slurred speech so was taken to [facility] Emergency [facility]. EKG showed normal sinus rhythm. Cardiac enzymes were not checked. Head CT showed nothing acute. Chest X-ray showed nonspecific coarse interstitial attenuation bilaterally grossly similar to prior chest X-ray on 10/20/18. Her blood pressure increased from 145/67 to 164/78 when standing. She was admitted to [facility] for further evaluation. On physical examination, she had leg edema but noted that she had wrist edema as well, which she does not usually get.    * No surgery found *      Hospital Course:   Echo stable. UA negative.  4/16/19 BP is elevated, adjusted medications, monitor, patient complaining of constipation.  4/17/19 Currently no complaints, no episodes of syncope since admission, KUB showed no obstruction, BP trending down.  Patient ready for discharge back to [facility], case management following.     Consults:   Consults (From admission, onward)        Status Ordering Provider     Inpatient consult to  Telemedicine-Stroke  Once     Provider:  (Not yet assigned)    Acknowledged RASHEED BAUMANN     IP consult to case management  Once     Provider:  (Not yet assigned)    Acknowledged MAGALI LEO          Glaucoma  -Continue home latanoprost eye drops. Carteolol eye drops are not in hospital formulary.    Chronic diastolic heart failure  No signs of decompensation      Constipation due to opioid therapy  -Patient complained of abdominal fullness and constipation on yesterday, KUB showed no obstruction, no complaints today  -continue metamucil and colace on discharge    Nursing home resident  Wheelchair dependent  Return to [facility] on discharge.    Chronic pain  -Continue home tizanidine, hydrocodone-acetaminophen.    Essential hypertension  No complaints of chest pain on SOB  -185  -continue amlodipine to 10mg daily, continue propanolol  -continue hydralazine 25mg po q 12 hrs  -Monitor    Dementia without behavioral disturbance  Continue home memantine.    Bipolar affective disorder  -Continue home risperidone 0.5 mg BID, divalproex 250 mg BID, trazodone 25 mg HS.   -Check valproate level: slightly low at 41.9      Final Active Diagnoses:    Diagnosis Date Noted POA    Syncope [R55] 04/16/2019 Yes    Glaucoma [H40.9] 10/20/2018 Yes     Chronic    Chronic diastolic heart failure [I50.32] 09/26/2018 Yes     Chronic    Constipation due to opioid therapy [K59.03, T40.2X5A] 09/22/2018 Yes     Chronic    Wheelchair dependent [Z99.3] 09/21/2018 Not Applicable     Chronic    Nursing home resident [Z59.3] 09/21/2018 Not Applicable     Chronic    Essential hypertension [I10] 09/21/2018 Yes     Chronic    Dementia without behavioral disturbance [F03.90] 09/21/2018 Yes     Chronic    Chronic pain [G89.29] 09/21/2018 Yes     Chronic    Bipolar affective disorder [F31.9] 09/21/2018 Yes     Chronic      Problems Resolved During this Admission:    Diagnosis Date Noted Date Resolved POA     PRINCIPAL PROBLEM:  Syncope [R55] 10/20/2018 04/16/2019 Yes       Discharged Condition: stable    Disposition: Skilled Nursing Facility    Follow Up:    Patient Instructions:      Diet Cardiac     Notify your health care provider if you experience any of the following:  temperature >100.4     Notify your health care provider if you experience any of the following:  persistent nausea and vomiting or diarrhea     Notify your health care provider if you experience any of the following:  severe uncontrolled pain     Notify your health care provider if you experience any of the following:  difficulty breathing or increased cough     Notify your health care provider if you experience any of the following:  severe persistent headache     Notify your health care provider if you experience any of the following:  persistent dizziness, light-headedness, or visual disturbances     Notify your health care provider if you experience any of the following:  increased confusion or weakness     Notify your health care provider if you experience any of the following:     Activity as tolerated       Significant Diagnostic Studies: Labs:   BMP:   Recent Labs   Lab 04/16/19  0928 04/17/19  0741   GLU 99 93    140   K 4.0 4.1    100   CO2 30* 31*   BUN 18 18   CREATININE 0.8 0.9   CALCIUM 10.6* 10.1   MG 1.9 2.0   , CMP   Recent Labs   Lab 04/16/19  0928 04/17/19  0741    140   K 4.0 4.1    100   CO2 30* 31*   GLU 99 93   BUN 18 18   CREATININE 0.8 0.9   CALCIUM 10.6* 10.1   ANIONGAP 9 9   ESTGFRAFRICA >60 >60   EGFRNONAA >60 >60    and CBC   Recent Labs   Lab 04/16/19  0928 04/17/19  0741   WBC 6.00 5.82   HGB 13.6 13.2   HCT 43.1 41.9    241       Pending Diagnostic Studies:     None         Medications:  Reconciled Home Medications:      Medication List      START taking these medications    hydrALAZINE 25 MG tablet  Commonly known as:  APRESOLINE  Take 1 tablet (25 mg total) by mouth every 12  (twelve) hours.        CHANGE how you take these medications    amLODIPine 10 MG tablet  Commonly known as:  NORVASC  Take 1 tablet (10 mg total) by mouth once daily.  Start taking on:  4/18/2019  What changed:    · medication strength  · how much to take  · additional instructions        CONTINUE taking these medications    aspirin 81 MG EC tablet  Commonly known as:  ECOTRIN  Take 81 mg by mouth once daily.     CALCIUM 600 + D(3) ORAL  Take by mouth once daily.     carteolol 1 % ophthalmic solution  Commonly known as:  OCUPRESS  Place 1 drop into the right eye 2 (two) times daily.     cetirizine 10 MG tablet  Commonly known as:  ZYRTEC  Take 10 mg by mouth every evening.     divalproex 250 MG EC tablet  Commonly known as:  DEPAKOTE  Take 250 mg by mouth 2 (two) times daily.     docusate sodium 100 MG capsule  Commonly known as:  COLACE  Take 100 mg by mouth daily as needed for Constipation.     estradiol 0.01 % (0.1 mg/gram) vaginal cream  Commonly known as:  ESTRACE  Place 1 g vaginally twice a week. Monday and Thursday     fluticasone 50 mcg/actuation nasal spray  Commonly known as:  FLONASE  1 spray by Each Nare route every evening.     HYDROcodone-acetaminophen 5-325 mg per tablet  Commonly known as:  NORCO  Take 1 tablet by mouth 3 (three) times daily as needed for Pain.     latanoprost 0.005 % ophthalmic solution  Place 1 drop into the right eye every evening.     melatonin 3 mg Tab  Take by mouth every evening.     memantine 5 MG Tab  Commonly known as:  NAMENDA  Take 5 mg by mouth once daily.     methylcellulose oral powder  Take by mouth once daily. 30cc In orange juice daily     ONE DAILY MULTIVITAMIN per tablet  Generic drug:  multivitamin  Take 1 tablet by mouth once daily.     propranolol 40 MG tablet  Commonly known as:  INDERAL  Take 40 mg by mouth once daily.     risperiDONE 0.5 MG Tab  Commonly known as:  RISPERDAL  Take 0.5 mg by mouth 2 (two) times daily.     tiZANidine 4 MG tablet  Commonly  known as:  ZANAFLEX  Take 4 mg by mouth 3 (three) times daily.     traZODone 50 MG tablet  Commonly known as:  DESYREL  Take 25 mg by mouth every evening.     VITAMIN B-12 100 MCG tablet  Generic drug:  cyanocobalamin  Take 100 mcg by mouth once daily.            Indwelling Lines/Drains at time of discharge:   Lines/Drains/Airways          None          Time spent on the discharge of patient: 45 minutes  Patient was seen and examined on the date of discharge and determined to be suitable for discharge.         Carla Philip NP  Department of Hospital Medicine  [facility]

## 2019-04-17 NOTE — ASSESSMENT & PLAN NOTE
-Patient complained of abdominal fullness and constipation on yesterday, KUB showed no obstruction, no complaints today  -continue metamucil and colace on discharge

## 2019-04-17 NOTE — PLAN OF CARE
I called [facility] at 4:00pm because pt still has not been picked up.  Her scheduled time per [facility] was between 2:30-3:00pm.  Sariah says [facility] is on the way now.  The delay was they ended up picking up another resident first.

## 2019-04-17 NOTE — DISCHARGE INSTRUCTIONS
Syncope, Causes of (English) View Edit Remove   Syncope, Diagnosing (English) View Edit Remove   Constipation (Adult) (English) View Edit Remove   Heart Failure, Discharge Instructions for (English) View Edit Remove   Hydralazine tablets (English) View Edit Remove

## 2019-04-17 NOTE — PLAN OF CARE
VN note: VN attempted to round on patient, but monitor offline. Nursing notified.      04/17/19 1024   Type of Frequent Check   Type Patient Rounds;Other (see comments)  (VN Rounds, Monitor Off)

## 2019-04-17 NOTE — NURSING
Pt discharged back to [facility]. Pt transported via wheelchair with [facility]. VS stable.

## 2019-04-17 NOTE — PLAN OF CARE
Problem: Adult Inpatient Plan of Care  Goal: Plan of Care Review     04/17/19 0108   Plan of Care Review   Plan of Care Reviewed With patient   Progress improving   Pt to be discharged back to [facility] today. VS stable, pt repositioned and cleaned by staff. Pt vomit x 2 zofran was given, nausea resolved.

## 2019-04-17 NOTE — PLAN OF CARE
Problem: Adult Inpatient Plan of Care  Goal: Plan of Care Review  POC reviewed with patient, patient verbalized understanding. Administered meds as per md ordes.No noted distress during shift, Safety measures in place, bed in lowest position , call bell in reach, hob elevated, side rails up x 2 will continue to monitor patient

## 2019-04-17 NOTE — PLAN OF CARE
I sent referral to [facility] and they are reviewing.         04/17/19 1157   Post-Acute Status   Post-Acute Authorization Placement

## 2019-04-17 NOTE — PLAN OF CARE
[facility] says transport set up between 2:30-3:00 today.  Packet and report information given to Eyal bedside nurse.  Call [phone] and ask for Shahida.  Pt is going to rm [id number].         04/17/19 1341   Final Note   Assessment Type Final Discharge Note   Anticipated Discharge Disposition long-term [facility] Follow Up  Appt(s) scheduled? No   Discharge plans and expectations educations in teach back method with documentation complete? Yes   Right Care Referral Info   Post Acute Recommendation Other

## 2019-04-17 NOTE — PLAN OF CARE
[facility]    FACILITY TRANSFER ORDERS      Patient Name: Austyn Pedersen  YOB: 1940    [facility]   PCP Address: [address] [place] [address] / [place] [address]  PCP Phone Number: [phone]  PCP Fax: [phone]    Encounter Date: 04/17/2019    Admit to: Nursing Home retirement  Vital Signs:  Routine    Diagnoses:   Active Hospital Problems    Diagnosis  POA    Syncope [R55]  Yes    Glaucoma [H40.9]  Yes     Chronic    Chronic diastolic heart failure [I50.32]  Yes     Chronic    Constipation due to opioid therapy [K59.03, T40.2X5A]  Yes     Chronic    Wheelchair dependent [Z99.3]  Not Applicable     Chronic    Nursing home resident [Z59.3]  Not Applicable     [facility] ([address] [place] [address])      Essential hypertension [I10]  Yes     Chronic    Dementia without behavioral disturbance [F03.90]  Yes     Chronic    Chronic pain [G89.29]  Yes     Chronic    Bipolar affective disorder [F31.9]  Yes     Chronic      Resolved Hospital Problems    Diagnosis Date Resolved POA    *Syncope [R55] 04/16/2019 Yes       Allergies:Review of patient's allergies indicates:  No Known Allergies    Diet: cardiac diet    Activities: Activity as tolerated      Labs: CBC and CMP per facility protocol        Medications: Review discharge medications with patient and family and provide education.      Current Discharge Medication List      START taking these medications    Details   hydrALAZINE (APRESOLINE) 25 MG tablet Take 1 tablet (25 mg total) by mouth every 12 (twelve) hours.  Qty: 60 tablet, Refills: 11         CONTINUE these medications which have CHANGED    Details   amLODIPine (NORVASC) 10 MG tablet Take 1 tablet (10 mg total) by mouth once daily.  Qty: 30 tablet, Refills: 11         CONTINUE these medications which have NOT CHANGED    Details   aspirin (ECOTRIN) 81 MG EC tablet Take 81 mg by mouth once daily.      calcium  carbonate/vitamin D3 (CALCIUM 600 + D,3, ORAL) Take by mouth once daily.      carteolol (OCUPRESS) 1 % ophthalmic solution Place 1 drop into the right eye 2 (two) times daily.      cetirizine (ZYRTEC) 10 MG tablet Take 10 mg by mouth every evening.      cyanocobalamin (VITAMIN B-12) 100 MCG tablet Take 100 mcg by mouth once daily.      divalproex (DEPAKOTE) 250 MG EC tablet Take 250 mg by mouth 2 (two) times daily.       docusate sodium (COLACE) 100 MG capsule Take 100 mg by mouth daily as needed for Constipation.      estradiol (ESTRACE) 0.01 % (0.1 mg/gram) vaginal cream Place 1 g vaginally twice a week. Monday and Thursday      fluticasone (FLONASE) 50 mcg/actuation nasal spray 1 spray by Each Nare route every evening.       HYDROcodone-acetaminophen (NORCO) 5-325 mg per tablet Take 1 tablet by mouth 3 (three) times daily as needed for Pain.      latanoprost 0.005 % ophthalmic solution Place 1 drop into the right eye every evening.      melatonin 3 mg Tab Take by mouth every evening.      memantine (NAMENDA) 5 MG Tab Take 5 mg by mouth once daily.      methylcellulose oral powder Take by mouth once daily. 30cc In orange juice daily      multivitamin (ONE DAILY MULTIVITAMIN) per tablet Take 1 tablet by mouth once daily.      propranolol (INDERAL) 40 MG tablet Take 40 mg by mouth once daily.      risperiDONE (RISPERDAL) 0.5 MG Tab Take 0.5 mg by mouth 2 (two) times daily.       tiZANidine (ZANAFLEX) 4 MG tablet Take 4 mg by mouth 3 (three) times daily.      traZODone (DESYREL) 50 MG tablet Take 25 mg by mouth every evening.                  _________________________________  N'MEG Cuellar-C   Nurse Practitioner- Hospitalist  Department of Hospital Medicine [facility]     04/17/2019

## 2019-04-17 NOTE — ASSESSMENT & PLAN NOTE
No complaints of chest pain on SOB  -185  -continue amlodipine to 10mg daily, continue propanolol  -continue hydralazine 25mg po q 12 hrs  -Monitor

## 2019-04-18 NOTE — NURSING
Pt discharged back to [facility]. Transported downstairs via wheelchair with her belongings. VS wnl, pt in stable condition. Report called to Jose Juan.

## 2019-05-15 VITALS
WEIGHT: 190 LBS | RESPIRATION RATE: 18 BRPM | OXYGEN SATURATION: 93 % | BODY MASS INDEX: 30.53 KG/M2 | HEART RATE: 78 BPM | DIASTOLIC BLOOD PRESSURE: 94 MMHG | HEIGHT: 66 IN | TEMPERATURE: 97 F | SYSTOLIC BLOOD PRESSURE: 159 MMHG

## 2019-07-06 ENCOUNTER — HOSPITAL ENCOUNTER (EMERGENCY)
Facility: HOSPITAL | Age: 79
Discharge: HOME | End: 2019-07-06
Attending: EMERGENCY MEDICINE
Payer: MEDICARE

## 2019-07-06 VITALS
BODY MASS INDEX: 31.5 KG/M2 | WEIGHT: 196 LBS | RESPIRATION RATE: 18 BRPM | OXYGEN SATURATION: 94 % | DIASTOLIC BLOOD PRESSURE: 71 MMHG | HEIGHT: 66 IN | HEART RATE: 75 BPM | TEMPERATURE: 98 F | SYSTOLIC BLOOD PRESSURE: 139 MMHG

## 2019-07-06 DIAGNOSIS — R55 SYNCOPE: ICD-10-CM

## 2019-07-06 DIAGNOSIS — R41.82 ALTERED MENTAL STATUS: ICD-10-CM

## 2019-07-06 LAB
ALBUMIN SERPL BCP-MCNC: 3.3 G/DL (ref 3.5–5.2)
ALP SERPL-CCNC: 104 U/L (ref 55–135)
ALT SERPL W/O P-5'-P-CCNC: 23 U/L (ref 10–44)
AMPHET+METHAMPHET UR QL: NEGATIVE
ANION GAP SERPL CALC-SCNC: 10 MMOL/L (ref 8–16)
AST SERPL-CCNC: 19 U/L (ref 10–40)
BARBITURATES UR QL SCN>200 NG/ML: NEGATIVE
BASOPHILS # BLD AUTO: 0.03 K/UL (ref 0–0.2)
BASOPHILS NFR BLD: 0.5 % (ref 0–1.9)
BENZODIAZ UR QL SCN>200 NG/ML: NEGATIVE
BILIRUB SERPL-MCNC: 0.5 MG/DL (ref 0.1–1)
BILIRUB UR QL STRIP: NEGATIVE
BUN SERPL-MCNC: 22 MG/DL (ref 8–23)
BZE UR QL SCN: NEGATIVE
CALCIUM SERPL-MCNC: 9.6 MG/DL (ref 8.7–10.5)
CANNABINOIDS UR QL SCN: NEGATIVE
CHLORIDE SERPL-SCNC: 102 MMOL/L (ref 95–110)
CK SERPL-CCNC: 36 U/L (ref 20–180)
CLARITY UR: CLEAR
CO2 SERPL-SCNC: 28 MMOL/L (ref 23–29)
COLOR UR: YELLOW
CREAT SERPL-MCNC: 0.9 MG/DL (ref 0.5–1.4)
CREAT UR-MCNC: 211.4 MG/DL (ref 15–325)
DIFFERENTIAL METHOD: ABNORMAL
EOSINOPHIL # BLD AUTO: 0.2 K/UL (ref 0–0.5)
EOSINOPHIL NFR BLD: 3.2 % (ref 0–8)
ERYTHROCYTE [DISTWIDTH] IN BLOOD BY AUTOMATED COUNT: 15.5 % (ref 11.5–14.5)
EST. GFR  (AFRICAN AMERICAN): >60 ML/MIN/1.73 M^2
EST. GFR  (NON AFRICAN AMERICAN): >60 ML/MIN/1.73 M^2
GLUCOSE SERPL-MCNC: 85 MG/DL (ref 70–110)
GLUCOSE UR QL STRIP: NEGATIVE
HCT VFR BLD AUTO: 38.6 % (ref 37–48.5)
HGB BLD-MCNC: 12 G/DL (ref 12–16)
HGB UR QL STRIP: NEGATIVE
KETONES UR QL STRIP: ABNORMAL
LEUKOCYTE ESTERASE UR QL STRIP: NEGATIVE
LYMPHOCYTES # BLD AUTO: 2.1 K/UL (ref 1–4.8)
LYMPHOCYTES NFR BLD: 36.6 % (ref 18–48)
MCH RBC QN AUTO: 28.7 PG (ref 27–31)
MCHC RBC AUTO-ENTMCNC: 31.1 G/DL (ref 32–36)
MCV RBC AUTO: 92 FL (ref 82–98)
METHADONE UR QL SCN>300 NG/ML: NEGATIVE
MONOCYTES # BLD AUTO: 1 K/UL (ref 0.3–1)
MONOCYTES NFR BLD: 16.8 % (ref 4–15)
NEUTROPHILS # BLD AUTO: 2.4 K/UL (ref 1.8–7.7)
NEUTROPHILS NFR BLD: 42.9 % (ref 38–73)
NITRITE UR QL STRIP: NEGATIVE
OPIATES UR QL SCN: NORMAL
PCP UR QL SCN>25 NG/ML: NEGATIVE
PH UR STRIP: 6 [PH] (ref 5–8)
PLATELET # BLD AUTO: 228 K/UL (ref 150–350)
PMV BLD AUTO: 10.7 FL (ref 9.2–12.9)
POCT GLUCOSE: 83 MG/DL (ref 70–110)
POTASSIUM SERPL-SCNC: 4.3 MMOL/L (ref 3.5–5.1)
PROT SERPL-MCNC: 7.2 G/DL (ref 6–8.4)
PROT UR QL STRIP: ABNORMAL
RBC # BLD AUTO: 4.18 M/UL (ref 4–5.4)
SODIUM SERPL-SCNC: 140 MMOL/L (ref 136–145)
SP GR UR STRIP: 1.02 (ref 1–1.03)
TOXICOLOGY INFORMATION: NORMAL
URN SPEC COLLECT METH UR: ABNORMAL
UROBILINOGEN UR STRIP-ACNC: 1 EU/DL
WBC # BLD AUTO: 5.65 K/UL (ref 3.9–12.7)

## 2019-07-06 PROCEDURE — 93010 ELECTROCARDIOGRAM REPORT: CPT | Mod: ,,, | Performed by: INTERNAL MEDICINE

## 2019-07-06 PROCEDURE — P9612 CATHETERIZE FOR URINE SPEC: HCPCS

## 2019-07-06 PROCEDURE — 82550 ASSAY OF CK (CPK): CPT

## 2019-07-06 PROCEDURE — 93005 ELECTROCARDIOGRAM TRACING: CPT

## 2019-07-06 PROCEDURE — 93010 EKG 12-LEAD: ICD-10-PCS | Mod: ,,, | Performed by: INTERNAL MEDICINE

## 2019-07-06 PROCEDURE — 99284 EMERGENCY DEPT VISIT MOD MDM: CPT | Mod: 25

## 2019-07-06 PROCEDURE — 81003 URINALYSIS AUTO W/O SCOPE: CPT

## 2019-07-06 PROCEDURE — 25000003 PHARM REV CODE 250: Performed by: EMERGENCY MEDICINE

## 2019-07-06 PROCEDURE — 96360 HYDRATION IV INFUSION INIT: CPT

## 2019-07-06 PROCEDURE — 85025 COMPLETE CBC W/AUTO DIFF WBC: CPT

## 2019-07-06 PROCEDURE — 80307 DRUG TEST PRSMV CHEM ANLYZR: CPT

## 2019-07-06 PROCEDURE — 80053 COMPREHEN METABOLIC PANEL: CPT

## 2019-07-06 PROCEDURE — 82962 GLUCOSE BLOOD TEST: CPT

## 2019-07-06 RX ADMIN — SODIUM CHLORIDE 1000 ML: 0.9 INJECTION, SOLUTION INTRAVENOUS at 10:07

## 2019-07-06 NOTE — ED PROVIDER NOTES
Encounter Date: 7/6/2019    SCRIBE #1 NOTE: I, Anju Kvng, am scribing for, and in the presence of,  Barney Fields MD. I have scribed the entire note.       History   No chief complaint on file.    78 y.o. female presents to ER via EMS from [facility] s/p LOC this morning. Patient reports she had just started eating and felt very lightheaded, subsequently losing consciousness. No clonic tonic jerking noted, px did not lose continence. Was sitting when she had her LOC, no report of any fall and patient denies any injury. Denies any symptoms at this time.     The history is provided by the patient.     Review of patient's allergies indicates:  No Known Allergies  Past Medical History:   Diagnosis Date    Anemia     Bipolar disorder     Chronic diastolic heart failure     Chronic pain     Dementia     Depression     E coli bacteremia 9/21/2018    Extrapyramidal and movement disorder     Glaucoma     Hyperlipidemia     Hypertension     Pyelonephritis 9/21/2018    Right nephrolithiasis 9/21/2018    Scabies 05/05/2017     Past Surgical History:   Procedure Laterality Date    CYSTOSCOPY, WITH URETERAL STENT INSERTION, possible retrograde pyelogram Right 9/27/2018    Performed by Laura Sorenson MD at [facility] OR     No family history on file.  Social History     Tobacco Use    Smoking status: Unknown If Ever Smoked    Smokeless tobacco: Never Used   Substance Use Topics    Alcohol use: Not on file    Drug use: Not on file     Review of Systems   Constitutional: Negative for chills, fatigue and fever.   HENT: Negative for facial swelling, trouble swallowing and voice change.    Eyes: Negative for photophobia, pain and redness.   Respiratory: Negative for cough, choking and shortness of breath.    Cardiovascular: Negative for chest pain, palpitations and leg swelling.   Gastrointestinal: Negative for abdominal pain, diarrhea, nausea and vomiting.   Genitourinary: Negative for dysuria, frequency  and urgency.   Musculoskeletal: Negative for back pain, neck pain and neck stiffness.   Neurological: Positive for syncope and light-headedness. Negative for seizures, speech difficulty, numbness and headaches.   All other systems reviewed and are negative.      Physical Exam     Initial Vitals [07/06/19 1030]   BP Pulse Resp Temp SpO2   117/77 64 14 97 °F (36.1 °C) 95 %      MAP       --         Physical Exam    Nursing note and vitals reviewed.  Constitutional: She appears well-developed and well-nourished. No distress.   HENT:   Head: Normocephalic and atraumatic.   Dry MM; Oropharynx clear   Eyes: Conjunctivae and EOM are normal. Pupils are equal, round, and reactive to light.   Neck: Normal range of motion. Neck supple. No tracheal deviation present.   Cardiovascular: Normal rate, regular rhythm, normal heart sounds and intact distal pulses.   Pulmonary/Chest: Breath sounds normal. No respiratory distress. She has no wheezes. She has no rhonchi. She has no rales.   Abdominal: Soft. Bowel sounds are normal. She exhibits no distension. There is no tenderness.   Musculoskeletal: Normal range of motion. She exhibits no edema or tenderness.   Neurological: She is alert. She has normal strength. No cranial nerve deficit. GCS score is 15. GCS eye subscore is 4. GCS verbal subscore is 5. GCS motor subscore is 6.   Skin: Skin is warm and dry. Capillary refill takes less than 2 seconds.         ED Course   Procedures  Labs Reviewed - No data to display  EKG Readings: (Independently Interpreted)   Initial Reading: No STEMI. Previous EKG: Compared with most recent EKG Previous EKG Date: 4/13/19 (Minimal change). Rhythm: Normal Sinus Rhythm. Heart Rate: 70. Ectopy: No Ectopy. Conduction: Normal. ST Segments: Normal ST Segments. T Waves: Normal. Axis: Normal.         X-Rays:   Independently Interpreted Readings:   Other Readings:  CXR Interpreted by radiologist and visualized by me    Imaging Results          X-Ray Chest AP  Portable (Final result)  Result time 07/06/19 11:08:09    Final result by Aron Lane MD (07/06/19 11:08:09)                 Impression:      No acute abnormality.      Electronically signed by: Aron Lane MD  Date:    07/06/2019  Time:    11:08             Narrative:    EXAMINATION:  XR CHEST AP PORTABLE    CLINICAL HISTORY:  Altered mental status, unspecified    TECHNIQUE:  Single frontal view of the chest was performed.    COMPARISON:  Chest radiograph from 04/13/2019.    FINDINGS:  The lungs are clear, with normal appearance of pulmonary vasculature and no pleural effusion or pneumothorax.    The cardiac silhouette is normal in size. The hilar and mediastinal contours are unremarkable.    Bones are intact.  Scoliosis of the thoracic spine.                                Medical Decision Making:   History:   Old Medical Records: I decided to obtain old medical records.  Initial Assessment:   79 y/o F sent to the ER from NH for syncope  Differential Diagnosis:   Syncope, seizure, arrhythmia, dehydration, electrolyte dyscrasia, vasovagal, hypoglycemia  Independently Interpreted Test(s):   I have ordered and independently interpreted X-rays - see prior notes.  I have ordered and independently interpreted EKG Reading(s) - see prior notes  Clinical Tests:   Lab Tests: Reviewed       <> Summary of Lab: Benign  ED Management:  Px given some IVF. Lab work, EKG, and imaging benign, VS remain stable, patient without complaints. Px informed of results as well as plan to discharge with instructions to f/u with PCP, reasons to return to the ER.                       Clinical Impression:       ICD-10-CM ICD-9-CM   1. Syncope R55 780.2   2. Altered mental status R41.82 780.97         Disposition:   Disposition: Discharged  Condition: Stable       Scribe Attestation I, Dr. Barney Fields, personally performed the services described in this documentation. All medical record entries made by the scribe were at my  direction and in my presence.  I have reviewed the chart and agree that the record reflects my personal performance and is accurate and complete. Barney Fields MD.  11:34 AM 07/06/2019                       Barney Fields MD  07/06/19 1130

## 2019-07-06 NOTE — ED NOTES
Pt states she was eating breakfast a hardboiled egg and passed out in wheel chair. Pt denies hitting head, pain, chest pain, shortness of breath, dizziness, headache at this time.

## 2020-02-05 ENCOUNTER — HOSPITAL ENCOUNTER (EMERGENCY)
Facility: HOSPITAL | Age: 80
Discharge: HOME | End: 2020-02-05
Attending: EMERGENCY MEDICINE
Payer: MEDICARE

## 2020-02-05 VITALS
OXYGEN SATURATION: 96 % | RESPIRATION RATE: 18 BRPM | TEMPERATURE: 98 F | SYSTOLIC BLOOD PRESSURE: 155 MMHG | BODY MASS INDEX: 27.92 KG/M2 | DIASTOLIC BLOOD PRESSURE: 81 MMHG | HEART RATE: 76 BPM | WEIGHT: 173 LBS

## 2020-02-05 DIAGNOSIS — R41.82 ALTERED MENTAL STATUS: ICD-10-CM

## 2020-02-05 LAB
ALBUMIN SERPL BCP-MCNC: 3.4 G/DL (ref 3.5–5.2)
ALP SERPL-CCNC: 67 U/L (ref 55–135)
ALT SERPL W/O P-5'-P-CCNC: 22 U/L (ref 10–44)
AMMONIA PLAS-SCNC: 22 UMOL/L (ref 10–50)
AMPHET+METHAMPHET UR QL: NEGATIVE
ANION GAP SERPL CALC-SCNC: 8 MMOL/L (ref 8–16)
AST SERPL-CCNC: 27 U/L (ref 10–40)
BARBITURATES UR QL SCN>200 NG/ML: NEGATIVE
BASOPHILS # BLD AUTO: 0.04 K/UL (ref 0–0.2)
BASOPHILS NFR BLD: 0.6 % (ref 0–1.9)
BENZODIAZ UR QL SCN>200 NG/ML: NEGATIVE
BILIRUB SERPL-MCNC: 0.4 MG/DL (ref 0.1–1)
BILIRUB UR QL STRIP: NEGATIVE
BUN SERPL-MCNC: 26 MG/DL (ref 8–23)
BZE UR QL SCN: NEGATIVE
CALCIUM SERPL-MCNC: 10 MG/DL (ref 8.7–10.5)
CANNABINOIDS UR QL SCN: NEGATIVE
CHLORIDE SERPL-SCNC: 99 MMOL/L (ref 95–110)
CK SERPL-CCNC: 41 U/L (ref 20–180)
CLARITY UR: CLEAR
CO2 SERPL-SCNC: 36 MMOL/L (ref 23–29)
COLOR UR: YELLOW
CREAT SERPL-MCNC: 0.9 MG/DL (ref 0.5–1.4)
CREAT UR-MCNC: 143.8 MG/DL (ref 15–325)
DIFFERENTIAL METHOD: ABNORMAL
EOSINOPHIL # BLD AUTO: 0.1 K/UL (ref 0–0.5)
EOSINOPHIL NFR BLD: 1.8 % (ref 0–8)
ERYTHROCYTE [DISTWIDTH] IN BLOOD BY AUTOMATED COUNT: 14.6 % (ref 11.5–14.5)
EST. GFR  (AFRICAN AMERICAN): >60 ML/MIN/1.73 M^2
EST. GFR  (NON AFRICAN AMERICAN): >60 ML/MIN/1.73 M^2
GLUCOSE SERPL-MCNC: 98 MG/DL (ref 70–110)
GLUCOSE UR QL STRIP: NEGATIVE
HCT VFR BLD AUTO: 42.8 % (ref 37–48.5)
HGB BLD-MCNC: 13.7 G/DL (ref 12–16)
HGB UR QL STRIP: NEGATIVE
IMM GRANULOCYTES # BLD AUTO: 0.07 K/UL (ref 0–0.04)
IMM GRANULOCYTES NFR BLD AUTO: 1.1 % (ref 0–0.5)
KETONES UR QL STRIP: NEGATIVE
LEUKOCYTE ESTERASE UR QL STRIP: NEGATIVE
LYMPHOCYTES # BLD AUTO: 2.1 K/UL (ref 1–4.8)
LYMPHOCYTES NFR BLD: 32.9 % (ref 18–48)
MCH RBC QN AUTO: 31.9 PG (ref 27–31)
MCHC RBC AUTO-ENTMCNC: 32 G/DL (ref 32–36)
MCV RBC AUTO: 100 FL (ref 82–98)
METHADONE UR QL SCN>300 NG/ML: NEGATIVE
MONOCYTES # BLD AUTO: 0.9 K/UL (ref 0.3–1)
MONOCYTES NFR BLD: 13.9 % (ref 4–15)
NEUTROPHILS # BLD AUTO: 3.1 K/UL (ref 1.8–7.7)
NEUTROPHILS NFR BLD: 49.7 % (ref 38–73)
NITRITE UR QL STRIP: NEGATIVE
NRBC BLD-RTO: 0 /100 WBC
OPIATES UR QL SCN: NORMAL
PCP UR QL SCN>25 NG/ML: NEGATIVE
PH UR STRIP: 7 [PH] (ref 5–8)
PLATELET # BLD AUTO: 166 K/UL (ref 150–350)
PMV BLD AUTO: 10.8 FL (ref 9.2–12.9)
POTASSIUM SERPL-SCNC: 4 MMOL/L (ref 3.5–5.1)
PROT SERPL-MCNC: 7.5 G/DL (ref 6–8.4)
PROT UR QL STRIP: NEGATIVE
RBC # BLD AUTO: 4.3 M/UL (ref 4–5.4)
SODIUM SERPL-SCNC: 143 MMOL/L (ref 136–145)
SP GR UR STRIP: 1.02 (ref 1–1.03)
TOXICOLOGY INFORMATION: NORMAL
URN SPEC COLLECT METH UR: NORMAL
UROBILINOGEN UR STRIP-ACNC: 1 EU/DL
WBC # BLD AUTO: 6.26 K/UL (ref 3.9–12.7)

## 2020-02-05 PROCEDURE — 85025 COMPLETE CBC W/AUTO DIFF WBC: CPT

## 2020-02-05 PROCEDURE — 80307 DRUG TEST PRSMV CHEM ANLYZR: CPT

## 2020-02-05 PROCEDURE — 63600175 PHARM REV CODE 636 W HCPCS: Performed by: EMERGENCY MEDICINE

## 2020-02-05 PROCEDURE — 99285 EMERGENCY DEPT VISIT HI MDM: CPT | Mod: 25

## 2020-02-05 PROCEDURE — 80053 COMPREHEN METABOLIC PANEL: CPT

## 2020-02-05 PROCEDURE — 82140 ASSAY OF AMMONIA: CPT

## 2020-02-05 PROCEDURE — 81003 URINALYSIS AUTO W/O SCOPE: CPT | Mod: 59

## 2020-02-05 PROCEDURE — 93005 ELECTROCARDIOGRAM TRACING: CPT

## 2020-02-05 PROCEDURE — 96360 HYDRATION IV INFUSION INIT: CPT

## 2020-02-05 PROCEDURE — 82550 ASSAY OF CK (CPK): CPT

## 2020-02-05 RX ORDER — SODIUM CHLORIDE 9 MG/ML
500 INJECTION, SOLUTION INTRAVENOUS
Status: COMPLETED | OUTPATIENT
Start: 2020-02-05 | End: 2020-02-05

## 2020-02-05 RX ADMIN — SODIUM CHLORIDE 500 ML: 0.9 INJECTION, SOLUTION INTRAVENOUS at 11:02

## 2020-02-06 NOTE — ED NOTES
Pt presents to the ED w/ c/ of altered mental status. Pt is from [facility] and was sent for slurred speech, altered mental status, and drowsy for 1day. Pt is wheelchair bound. Pt denies chest pain, SOB, or n/v. Pt is noted to have slurred speech. Pt appears dorwsy but is alert. Pt is orientedx4.

## 2020-02-06 NOTE — ED NOTES
Introduced self to patient. Let her know I would be in soon to do an in and out cath for urine collection.

## 2020-02-06 NOTE — ED NOTES
Nurse spoke with patients care giver. Caregiver confirmed patients mental status at this time is baseline. She did confirm patient does have dementia so sometimes she will say things that are not true or normal. Let her know labs and scans were still in process.

## 2020-02-06 NOTE — ED PROVIDER NOTES
Encounter Date: 2/5/2020    SCRIBE #1 NOTE: I, Fei Hopson, am scribing for, and in the presence of,  Dr. Govea. I have scribed the entire note.       History     Chief Complaint   Patient presents with    Altered Mental Status     altered mental status, drowsy, slurred speech over 1day from [facility].      This is a 79 y.o. female who  has a past medical history of Anemia, Bipolar disorder, Chronic diastolic heart failure, Chronic pain, Dementia, Depression, E coli bacteremia (9/21/2018), Extrapyramidal and movement disorder, Glaucoma, Hyperlipidemia, Hypertension, Pyelonephritis (9/21/2018), Right nephrolithiasis (9/21/2018), and Scabies (05/05/2017) presents via EMS from [facility] due to altered mental status. EMS reports the patient is normally alert and oriented x 4 and wheelchair bound at baseline. However, over the past 2 days the patient has had, reportedly,  intermittent slurred speech with increased confusion and decreased alertness. On arrival to the ER, the patient is somnolent but answering all questions and oriented x 4. Patient denies any pain, and does not report any other symptoms.    The history is provided by the patient and the EMS personnel. The history is limited by the condition of the patient.     Review of patient's allergies indicates:  No Known Allergies  Past Medical History:   Diagnosis Date    Anemia     Bipolar disorder     Chronic diastolic heart failure     Chronic pain     Dementia     Depression     E coli bacteremia 9/21/2018    Extrapyramidal and movement disorder     Glaucoma     Hyperlipidemia     Hypertension     Pyelonephritis 9/21/2018    Right nephrolithiasis 9/21/2018    Scabies 05/05/2017     Past Surgical History:   Procedure Laterality Date    CYSTOSCOPY W/ URETERAL STENT PLACEMENT Right 9/27/2018    Procedure: CYSTOSCOPY, WITH URETERAL STENT INSERTION, possible retrograde pyelogram;  Surgeon: Laura Sorenson MD;  Location: [facility]  OR;  Service: Urology;  Laterality: Right;     No family history on file.  Social History     Tobacco Use    Smoking status: Unknown If Ever Smoked    Smokeless tobacco: Never Used   Substance Use Topics    Alcohol use: Not on file    Drug use: Not on file     Review of Systems   Unable to perform ROS: Mental status change       Physical Exam     Initial Vitals [02/05/20 1827]   BP Pulse Resp Temp SpO2   (!) 145/75 69 20 97.5 °F (36.4 °C) 96 %      MAP       --         Physical Exam    Nursing note and vitals reviewed.  Constitutional: She appears well-developed and well-nourished. She is not diaphoretic. No distress.   HENT:   Head: Normocephalic and atraumatic.   Dry mucous membranes    Eyes: Conjunctivae and EOM are normal.   Neck: Normal range of motion. Neck supple.   Cardiovascular: Normal rate, regular rhythm and normal heart sounds.   Pulmonary/Chest: Breath sounds normal. No respiratory distress.   Abdominal: Soft. There is no tenderness.   Musculoskeletal: Normal range of motion. She exhibits no edema or tenderness.   Neurological: She is alert and oriented to person, place, and time.   Somnolent  Oriented x 4  No neurological deficits  Pt answering questions appropriately  No stroke-like symptoms appreciated   No slurred speech    Skin: Skin is warm and dry. Capillary refill takes less than 2 seconds.         ED Course   Procedures  Labs Reviewed   CBC W/ AUTO DIFFERENTIAL - Abnormal; Notable for the following components:       Result Value    Mean Corpuscular Volume 100 (*)     Mean Corpuscular Hemoglobin 31.9 (*)     RDW 14.6 (*)     Immature Granulocytes 1.1 (*)     Immature Grans (Abs) 0.07 (*)     All other components within normal limits   COMPREHENSIVE METABOLIC PANEL - Abnormal; Notable for the following components:    CO2 36 (*)     BUN, Bld 26 (*)     Albumin 3.4 (*)     All other components within normal limits   URINALYSIS, REFLEX TO URINE CULTURE    Narrative:     Preferred Collection  Type->Urine, Catheterized   DRUG SCREEN PANEL, URINE EMERGENCY    Narrative:     Preferred Collection Type->Urine, Catheterized   CK   AMMONIA     EKG Readings: (Independently Interpreted)   NSR at 61 bpm, No significant ST elevation or depression, No T wave inversions, No STEMI (Interpreted by self).       Imaging Results          CT Head Without Contrast (Final result)  Result time 02/05/20 22:20:19    Final result by Mary Chaney MD (02/05/20 22:20:19)                 Impression:      No acute intracranial abnormality detected. No significant change.      Electronically signed by: Mary Chaney  Date:    02/05/2020  Time:    22:20             Narrative:    EXAMINATION:  CT OF THE HEAD WITHOUT    CLINICAL HISTORY:  Confusion/delirium, altered LOC, unexplained;    TECHNIQUE:  5 mm unenhanced axial images were obtained from the skull base to the vertex.    COMPARISON:  04/13/2019    FINDINGS:  Moderate cerebral atrophy and chronic small vessel ischemic changes are present.  There is no acute intracranial hemorrhage, territorial infarct or mass effect, or midline shift.  Senescent basal ganglia calcifications are seen in the basal ganglia.  In the visualized paranasal sinus and mastoid air cells, there is small left maxillary sinus mucoperiosteal thickening.  Postsurgical changes to the right globe are again seen.  There is hyperostosis frontalis.                               X-Ray Chest 1 View (Final result)  Result time 02/05/20 19:19:17    Final result by Evin Domingo MD (02/05/20 19:19:17)                 Impression:      No acute process.      Electronically signed by: Evin Domingo MD  Date:    02/05/2020  Time:    19:19             Narrative:    EXAMINATION:  XR CHEST 1 VIEW    CLINICAL HISTORY:  Altered mental status, unspecified    TECHNIQUE:  Single frontal view of the chest was performed.    COMPARISON:  07/06/2019.    FINDINGS:  Monitoring EKG leads are present.  The trachea is unremarkable.  The  cardiomediastinal silhouette is within normal limits.  The hilar structures are unremarkable.  The hemidiaphragms are within normal limits.  There is no evidence of free air beneath the hemidiaphragms.  There are no pleural effusions.  There is no evidence of a pneumothorax.  There is no evidence of pneumomediastinum.  No airspace opacity is present.  There are degenerative changes in the osseous structures.                                 Medical Decision Making:   Clinical Tests:   Lab Tests: Ordered and Reviewed  Radiological Study: Ordered and Reviewed  Medical Tests: Ordered and Reviewed  ED Management:  - VSS; pt is afebrile and in no acute distress   - EKG with NSR at 61 bpm, No significant ST elevation or depression, No T wave inversions, No STEMI (Interpreted by self).  - CBC w/diff WNL: H/H stable; no significant leukocytosis   - CMP WNL: no significant electrolyte abnormality noted   - UDS positive for opiates   - CPK WNL   - Ammonia WNL   - UA without findings suggestive of infectious process; nitrite negative; no leuks  - CXR without acute cardiopulmonary process per my interpretation, final radiology read   - CT Head WO negative for acute intracranial abnormality per final radiology read  - IVF administered while in ED  - No acute intervention required at this time; no focal neurological deficits; pt answering my questions appropriately; pt not slurring her words   - Pt stable for discharge  - Pt to f/u with her PCP in next 2-3 days for recheck of today's complaints  - Pt and NH given strict return precautions for any new or worsening of symptoms                                   Clinical Impression:       ICD-10-CM ICD-9-CM   1. Altered mental status R41.82 780.97        I, Hu Govea,  personally performed the services described in this documentation. All medical record entries made by the scribe were at my direction and in my presence.  I have reviewed the chart and agree that the record  reflects my personal performance and is accurate and complete. Hu Govea M.D. 10:58 PM02/05/2020     Hu Govea MD  02/05/20 [id number]

## 2020-02-06 NOTE — ED NOTES
Report given to Mila jacinto [facility]. Let her know patient would come back in wheelchair and ETA should be 1-2 hours.

## 2020-06-08 ENCOUNTER — LAB VISIT (OUTPATIENT)
Dept: PRIMARY CARE CLINIC | Facility: OTHER | Age: 80
End: 2020-06-08
Payer: MEDICARE

## 2020-06-08 PROCEDURE — U0003 INFECTIOUS AGENT DETECTION BY NUCLEIC ACID (DNA OR RNA); SEVERE ACUTE RESPIRATORY SYNDROME CORONAVIRUS 2 (SARS-COV-2) (CORONAVIRUS DISEASE [COVID-19]), AMPLIFIED PROBE TECHNIQUE, MAKING USE OF HIGH THROUGHPUT TECHNOLOGIES AS DESCRIBED BY CMS-2020-01-R: HCPCS

## 2020-06-09 LAB — SARS-COV-2 RNA RESP QL NAA+PROBE: NOT DETECTED

## 2020-06-15 ENCOUNTER — LAB VISIT (OUTPATIENT)
Dept: LAB | Facility: OTHER | Age: 80
End: 2020-06-15
Payer: MEDICARE

## 2020-06-15 DIAGNOSIS — Z20.822 SUSPECTED COVID-19 VIRUS INFECTION: ICD-10-CM

## 2020-06-15 PROCEDURE — U0003 INFECTIOUS AGENT DETECTION BY NUCLEIC ACID (DNA OR RNA); SEVERE ACUTE RESPIRATORY SYNDROME CORONAVIRUS 2 (SARS-COV-2) (CORONAVIRUS DISEASE [COVID-19]), AMPLIFIED PROBE TECHNIQUE, MAKING USE OF HIGH THROUGHPUT TECHNOLOGIES AS DESCRIBED BY CMS-2020-01-R: HCPCS

## 2020-06-17 LAB — SARS-COV-2 RNA RESP QL NAA+PROBE: NOT DETECTED

## 2020-06-22 ENCOUNTER — LAB VISIT (OUTPATIENT)
Dept: LAB | Facility: OTHER | Age: 80
End: 2020-06-22
Payer: MEDICARE

## 2020-06-22 DIAGNOSIS — Z20.822 SUSPECTED COVID-19 VIRUS INFECTION: ICD-10-CM

## 2020-06-22 PROCEDURE — U0003 INFECTIOUS AGENT DETECTION BY NUCLEIC ACID (DNA OR RNA); SEVERE ACUTE RESPIRATORY SYNDROME CORONAVIRUS 2 (SARS-COV-2) (CORONAVIRUS DISEASE [COVID-19]), AMPLIFIED PROBE TECHNIQUE, MAKING USE OF HIGH THROUGHPUT TECHNOLOGIES AS DESCRIBED BY CMS-2020-01-R: HCPCS

## 2020-06-24 LAB — SARS-COV-2 RNA RESP QL NAA+PROBE: NOT DETECTED

## 2020-06-29 ENCOUNTER — LAB VISIT (OUTPATIENT)
Dept: LAB | Facility: OTHER | Age: 80
End: 2020-06-29
Payer: MEDICARE

## 2020-06-29 DIAGNOSIS — Z20.822 SUSPECTED COVID-19 VIRUS INFECTION: ICD-10-CM

## 2020-06-29 PROCEDURE — U0003 INFECTIOUS AGENT DETECTION BY NUCLEIC ACID (DNA OR RNA); SEVERE ACUTE RESPIRATORY SYNDROME CORONAVIRUS 2 (SARS-COV-2) (CORONAVIRUS DISEASE [COVID-19]), AMPLIFIED PROBE TECHNIQUE, MAKING USE OF HIGH THROUGHPUT TECHNOLOGIES AS DESCRIBED BY CMS-2020-01-R: HCPCS | Mod: ST72

## 2020-07-05 LAB — SARS-COV-2 RNA RESP QL NAA+PROBE: NEGATIVE

## 2020-07-06 ENCOUNTER — LAB VISIT (OUTPATIENT)
Dept: LAB | Facility: OTHER | Age: 80
End: 2020-07-06
Attending: INTERNAL MEDICINE
Payer: MEDICARE

## 2020-07-06 DIAGNOSIS — Z20.822 SUSPECTED COVID-19 VIRUS INFECTION: ICD-10-CM

## 2020-07-06 PROCEDURE — U0003 INFECTIOUS AGENT DETECTION BY NUCLEIC ACID (DNA OR RNA); SEVERE ACUTE RESPIRATORY SYNDROME CORONAVIRUS 2 (SARS-COV-2) (CORONAVIRUS DISEASE [COVID-19]), AMPLIFIED PROBE TECHNIQUE, MAKING USE OF HIGH THROUGHPUT TECHNOLOGIES AS DESCRIBED BY CMS-2020-01-R: HCPCS

## 2020-07-07 LAB — SARS-COV-2 RNA RESP QL NAA+PROBE: NEGATIVE

## 2020-07-13 ENCOUNTER — LAB VISIT (OUTPATIENT)
Dept: LAB | Facility: OTHER | Age: 80
End: 2020-07-13
Attending: INTERNAL MEDICINE
Payer: MEDICARE

## 2020-07-13 DIAGNOSIS — Z20.822 SUSPECTED COVID-19 VIRUS INFECTION: ICD-10-CM

## 2020-07-13 PROCEDURE — U0003 INFECTIOUS AGENT DETECTION BY NUCLEIC ACID (DNA OR RNA); SEVERE ACUTE RESPIRATORY SYNDROME CORONAVIRUS 2 (SARS-COV-2) (CORONAVIRUS DISEASE [COVID-19]), AMPLIFIED PROBE TECHNIQUE, MAKING USE OF HIGH THROUGHPUT TECHNOLOGIES AS DESCRIBED BY CMS-2020-01-R: HCPCS

## 2020-07-16 LAB — SARS-COV-2 RNA RESP QL NAA+PROBE: NEGATIVE

## 2020-07-20 ENCOUNTER — LAB VISIT (OUTPATIENT)
Dept: LAB | Facility: OTHER | Age: 80
End: 2020-07-20
Payer: MEDICARE

## 2020-07-20 DIAGNOSIS — Z03.818 ENCOUNTER FOR OBSERVATION FOR SUSPECTED EXPOSURE TO OTHER BIOLOGICAL AGENTS RULED OUT: ICD-10-CM

## 2020-07-20 DIAGNOSIS — Z20.822 SUSPECTED COVID-19 VIRUS INFECTION: ICD-10-CM

## 2020-07-20 PROCEDURE — U0003 INFECTIOUS AGENT DETECTION BY NUCLEIC ACID (DNA OR RNA); SEVERE ACUTE RESPIRATORY SYNDROME CORONAVIRUS 2 (SARS-COV-2) (CORONAVIRUS DISEASE [COVID-19]), AMPLIFIED PROBE TECHNIQUE, MAKING USE OF HIGH THROUGHPUT TECHNOLOGIES AS DESCRIBED BY CMS-2020-01-R: HCPCS

## 2020-07-23 LAB — SARS-COV-2 RNA RESP QL NAA+PROBE: NEGATIVE

## 2020-07-27 ENCOUNTER — LAB VISIT (OUTPATIENT)
Dept: LAB | Facility: OTHER | Age: 80
End: 2020-07-27
Payer: MEDICARE

## 2020-07-27 DIAGNOSIS — Z03.818 ENCOUNTER FOR OBSERVATION FOR SUSPECTED EXPOSURE TO OTHER BIOLOGICAL AGENTS RULED OUT: ICD-10-CM

## 2020-07-27 DIAGNOSIS — Z20.822 SUSPECTED COVID-19 VIRUS INFECTION: ICD-10-CM

## 2020-07-27 PROCEDURE — U0003 INFECTIOUS AGENT DETECTION BY NUCLEIC ACID (DNA OR RNA); SEVERE ACUTE RESPIRATORY SYNDROME CORONAVIRUS 2 (SARS-COV-2) (CORONAVIRUS DISEASE [COVID-19]), AMPLIFIED PROBE TECHNIQUE, MAKING USE OF HIGH THROUGHPUT TECHNOLOGIES AS DESCRIBED BY CMS-2020-01-R: HCPCS

## 2020-07-30 LAB — SARS-COV-2 RNA RESP QL NAA+PROBE: NEGATIVE

## 2020-08-03 ENCOUNTER — LAB VISIT (OUTPATIENT)
Dept: LAB | Facility: OTHER | Age: 80
End: 2020-08-03
Payer: MEDICARE

## 2020-08-03 DIAGNOSIS — Z20.822 SUSPECTED COVID-19 VIRUS INFECTION: ICD-10-CM

## 2020-08-03 DIAGNOSIS — Z03.818 ENCOUNTER FOR OBSERVATION FOR SUSPECTED EXPOSURE TO OTHER BIOLOGICAL AGENTS RULED OUT: ICD-10-CM

## 2020-08-03 PROCEDURE — U0003 INFECTIOUS AGENT DETECTION BY NUCLEIC ACID (DNA OR RNA); SEVERE ACUTE RESPIRATORY SYNDROME CORONAVIRUS 2 (SARS-COV-2) (CORONAVIRUS DISEASE [COVID-19]), AMPLIFIED PROBE TECHNIQUE, MAKING USE OF HIGH THROUGHPUT TECHNOLOGIES AS DESCRIBED BY CMS-2020-01-R: HCPCS

## 2020-08-04 LAB — SARS-COV-2 RNA RESP QL NAA+PROBE: NOT DETECTED

## 2020-08-10 ENCOUNTER — LAB VISIT (OUTPATIENT)
Dept: LAB | Facility: OTHER | Age: 80
End: 2020-08-10
Payer: MEDICARE

## 2020-08-10 DIAGNOSIS — Z03.818 ENCOUNTER FOR OBSERVATION FOR SUSPECTED EXPOSURE TO OTHER BIOLOGICAL AGENTS RULED OUT: ICD-10-CM

## 2020-08-10 PROCEDURE — U0003 INFECTIOUS AGENT DETECTION BY NUCLEIC ACID (DNA OR RNA); SEVERE ACUTE RESPIRATORY SYNDROME CORONAVIRUS 2 (SARS-COV-2) (CORONAVIRUS DISEASE [COVID-19]), AMPLIFIED PROBE TECHNIQUE, MAKING USE OF HIGH THROUGHPUT TECHNOLOGIES AS DESCRIBED BY CMS-2020-01-R: HCPCS

## 2020-08-12 LAB — SARS-COV-2 RNA RESP QL NAA+PROBE: NOT DETECTED

## 2020-08-17 ENCOUNTER — LAB VISIT (OUTPATIENT)
Dept: LAB | Facility: OTHER | Age: 80
End: 2020-08-17
Payer: MEDICARE

## 2020-08-17 DIAGNOSIS — Z03.818 ENCOUNTER FOR OBSERVATION FOR SUSPECTED EXPOSURE TO OTHER BIOLOGICAL AGENTS RULED OUT: ICD-10-CM

## 2020-08-17 PROCEDURE — U0003 INFECTIOUS AGENT DETECTION BY NUCLEIC ACID (DNA OR RNA); SEVERE ACUTE RESPIRATORY SYNDROME CORONAVIRUS 2 (SARS-COV-2) (CORONAVIRUS DISEASE [COVID-19]), AMPLIFIED PROBE TECHNIQUE, MAKING USE OF HIGH THROUGHPUT TECHNOLOGIES AS DESCRIBED BY CMS-2020-01-R: HCPCS

## 2020-08-20 LAB — SARS-COV-2 RNA RESP QL NAA+PROBE: NOT DETECTED

## 2020-08-24 ENCOUNTER — LAB VISIT (OUTPATIENT)
Dept: LAB | Facility: OTHER | Age: 80
End: 2020-08-24
Payer: MEDICARE

## 2020-08-24 DIAGNOSIS — Z03.818 ENCOUNTER FOR OBSERVATION FOR SUSPECTED EXPOSURE TO OTHER BIOLOGICAL AGENTS RULED OUT: ICD-10-CM

## 2020-08-24 PROCEDURE — U0003 INFECTIOUS AGENT DETECTION BY NUCLEIC ACID (DNA OR RNA); SEVERE ACUTE RESPIRATORY SYNDROME CORONAVIRUS 2 (SARS-COV-2) (CORONAVIRUS DISEASE [COVID-19]), AMPLIFIED PROBE TECHNIQUE, MAKING USE OF HIGH THROUGHPUT TECHNOLOGIES AS DESCRIBED BY CMS-2020-01-R: HCPCS

## 2020-08-25 LAB — SARS-COV-2 RNA RESP QL NAA+PROBE: NOT DETECTED

## 2020-08-31 ENCOUNTER — LAB VISIT (OUTPATIENT)
Dept: LAB | Facility: OTHER | Age: 80
End: 2020-08-31
Payer: MEDICARE

## 2020-08-31 DIAGNOSIS — Z03.818 ENCOUNTER FOR OBSERVATION FOR SUSPECTED EXPOSURE TO OTHER BIOLOGICAL AGENTS RULED OUT: ICD-10-CM

## 2020-08-31 PROCEDURE — U0003 INFECTIOUS AGENT DETECTION BY NUCLEIC ACID (DNA OR RNA); SEVERE ACUTE RESPIRATORY SYNDROME CORONAVIRUS 2 (SARS-COV-2) (CORONAVIRUS DISEASE [COVID-19]), AMPLIFIED PROBE TECHNIQUE, MAKING USE OF HIGH THROUGHPUT TECHNOLOGIES AS DESCRIBED BY CMS-2020-01-R: HCPCS

## 2020-09-02 LAB — SARS-COV-2 RNA RESP QL NAA+PROBE: NOT DETECTED

## 2020-09-22 ENCOUNTER — HOSPITAL ENCOUNTER (EMERGENCY)
Facility: HOSPITAL | Age: 80
Discharge: HOME | End: 2020-09-22
Attending: EMERGENCY MEDICINE
Payer: MEDICARE

## 2020-09-22 VITALS
HEART RATE: 66 BPM | OXYGEN SATURATION: 96 % | BODY MASS INDEX: 27.92 KG/M2 | WEIGHT: 173 LBS | TEMPERATURE: 99 F | DIASTOLIC BLOOD PRESSURE: 86 MMHG | SYSTOLIC BLOOD PRESSURE: 146 MMHG | RESPIRATION RATE: 14 BRPM

## 2020-09-22 DIAGNOSIS — H66.93 BILATERAL OTITIS MEDIA, UNSPECIFIED OTITIS MEDIA TYPE: Primary | ICD-10-CM

## 2020-09-22 DIAGNOSIS — H72.93 PERFORATION OF BOTH TYMPANIC MEMBRANES: ICD-10-CM

## 2020-09-22 PROCEDURE — 25000003 PHARM REV CODE 250: Performed by: PHYSICIAN ASSISTANT

## 2020-09-22 PROCEDURE — 99283 EMERGENCY DEPT VISIT LOW MDM: CPT

## 2020-09-22 RX ORDER — OFLOXACIN 3 MG/ML
5 SOLUTION AURICULAR (OTIC) DAILY
Status: DISCONTINUED | OUTPATIENT
Start: 2020-09-22 | End: 2020-09-22 | Stop reason: HOSPADM

## 2020-09-22 RX ORDER — ACETAMINOPHEN 325 MG/1
650 TABLET ORAL
Status: COMPLETED | OUTPATIENT
Start: 2020-09-22 | End: 2020-09-22

## 2020-09-22 RX ORDER — AMOXICILLIN AND CLAVULANATE POTASSIUM 875; 125 MG/1; MG/1
1 TABLET, FILM COATED ORAL 2 TIMES DAILY
Qty: 14 TABLET | Refills: 0 | Status: SHIPPED | OUTPATIENT
Start: 2020-09-22 | End: 2021-01-01 | Stop reason: CLARIF

## 2020-09-22 RX ADMIN — OFLOXACIN 5 DROP: 3 SOLUTION AURICULAR (OTIC) at 03:09

## 2020-09-22 RX ADMIN — ACETAMINOPHEN 650 MG: 325 TABLET ORAL at 03:09

## 2020-09-22 NOTE — ED PROVIDER NOTES
Encounter Date: 9/22/2020       History     Chief Complaint   Patient presents with    Otalgia     chronic ear pain/ jaw pain x 3 days. denies chest pain. pt from [facility]     Austyn Pedersen, a 80 y.o. female  has a past medical history of Anemia, Bipolar disorder, Chronic diastolic heart failure, Chronic pain, Dementia, Depression, E coli bacteremia (9/21/2018), Extrapyramidal and movement disorder, Glaucoma, Hyperlipidemia, Hypertension, Pyelonephritis (9/21/2018), Right nephrolithiasis (9/21/2018), and Scabies (05/05/2017).     She presents to the ED evaluation of bilateral ear pain x 2 - 3 weeks right > left.  States that she's had drainage from both ears with pain radiating to her jaw.  She is a patient at [facility] and is unsure if she has been treated with any oral medications.  Upon contacting [facility], her nurse states that debrox drops were used; however a practitioner did not visualize her TM's d/t lack of otoscope.  Pt was prescribed Doxycycline, however nurse was unable to provide time from or why the doxy was prescribed.  States that her pain did not improve, therefore she was sent to the emergency room for evaluation.        The history is provided by the patient and the nursing home.     Review of patient's allergies indicates:  No Known Allergies  Past Medical History:   Diagnosis Date    Anemia     Bipolar disorder     Chronic diastolic heart failure     Chronic pain     Dementia     Depression     E coli bacteremia 9/21/2018    Extrapyramidal and movement disorder     Glaucoma     Hyperlipidemia     Hypertension     Pyelonephritis 9/21/2018    Right nephrolithiasis 9/21/2018    Scabies 05/05/2017     Past Surgical History:   Procedure Laterality Date    CYSTOSCOPY W/ URETERAL STENT PLACEMENT Right 9/27/2018    Procedure: CYSTOSCOPY, WITH URETERAL STENT INSERTION, possible retrograde pyelogram;  Surgeon: Laura Sorenson MD;  Location: [facility];  Service: Urology;  Laterality:  Right;     No family history on file.  Social History     Tobacco Use    Smoking status: Unknown If Ever Smoked    Smokeless tobacco: Never Used   Substance Use Topics    Alcohol use: Not on file    Drug use: Not on file     Review of Systems   Constitutional: Negative for fever.   HENT: Positive for ear discharge and ear pain. Negative for congestion, dental problem, drooling, sore throat and trouble swallowing.    Skin: Negative for color change and rash.   Hematological: Negative for adenopathy.   Psychiatric/Behavioral: Negative for agitation.   All other systems reviewed and are negative.      Physical Exam     Initial Vitals [09/22/20 1343]   BP Pulse Resp Temp SpO2   (!) 146/86 66 14 98.9 °F (37.2 °C) 96 %      MAP       --         Physical Exam    Nursing note and vitals reviewed.  Constitutional: She appears well-developed and well-nourished. She is not diaphoretic. No distress.   HENT:   Head: Normocephalic and atraumatic.   Right Ear: External ear normal. There is drainage. No mastoid tenderness. Tympanic membrane is perforated.   Left Ear: External ear normal. There is drainage. No mastoid tenderness. Tympanic membrane is perforated.   Nose: Nose normal.   Eyes: Conjunctivae and EOM are normal.   Neck: Normal range of motion.   Cardiovascular: Normal rate and regular rhythm.   Pulmonary/Chest: Breath sounds normal. No respiratory distress.   Musculoskeletal: Normal range of motion.   Neurological: She is alert and oriented to person, place, and time.   Skin: Skin is warm and dry. Capillary refill takes less than 2 seconds. No rash noted. No erythema.   Psychiatric: She has a normal mood and affect. Thought content normal.         ED Course   Procedures  Labs Reviewed - No data to display       Imaging Results    None          Medical Decision Making:   Initial Assessment:   Bilateral ear pain with drainge   Differential Diagnosis:   OM, OE, mastoiditis  ED Management:  Pt presents to ED for  evaluation of bilateral ear pain x 2 - 3 weeks.  Ruptured TM noted bilaterally.  Patient given Ofloxacin ear drops as well as Augmentin.  Patient will be discharged back to NH with instruction to f/u with ENT specialist and to return with any new or worsening symptoms.                     ED Course as of Sep 22 1532   Tue Sep 22, 2020   1407 SORT NOTE:   Patient is a 81 y/o female who presents to ED from NH for right ear and jaw pain. H/o chronic ear infections.     Patient seen and medically screened by RD in sort process due to ED crowding.  Appropriate tests and/or medications ordered.  Care transferred to an alternate provider when patient was placed in an exam room from the [facility] for physical exam, additional orders, and disposition.    Jen Thomas PA-C          [EM]      ED Course User Index  [EM] Jen Thomas PA-C            Clinical Impression:       ICD-10-CM ICD-9-CM   1. Bilateral otitis media, unspecified otitis media type  H66.93 382.9   2. Perforation of both tympanic membranes  H72.93 384.20                          ED Disposition Condition    Discharge Stable        ED Prescriptions     Medication Sig Dispense Start Date End Date Auth. Provider    amoxicillin-clavulanate 875-125mg (AUGMENTIN) 875-125 mg per tablet Take 1 tablet by mouth 2 (two) times daily. 14 tablet 9/22/2020  Stephenie Dutton PA-C        Follow-up Information     Follow up With Specialties Details Why Contact Info        Please follow up with ENT specialist                                       Stephenie Dutton PA-C  09/22/20 [id number]

## 2021-01-01 ENCOUNTER — HOSPITAL ENCOUNTER (EMERGENCY)
Facility: HOSPITAL | Age: 81
Discharge: HOME | End: 2021-05-21
Attending: EMERGENCY MEDICINE
Payer: MEDICARE

## 2021-01-01 VITALS
OXYGEN SATURATION: 96 % | HEIGHT: 66 IN | HEART RATE: 112 BPM | RESPIRATION RATE: 18 BRPM | WEIGHT: 173 LBS | TEMPERATURE: 98 F | SYSTOLIC BLOOD PRESSURE: 197 MMHG | BODY MASS INDEX: 27.8 KG/M2 | DIASTOLIC BLOOD PRESSURE: 95 MMHG

## 2021-01-01 DIAGNOSIS — R41.0 CONFUSION: Primary | ICD-10-CM

## 2021-01-01 LAB
ALBUMIN SERPL BCP-MCNC: 4.3 G/DL (ref 3.5–5.2)
ALP SERPL-CCNC: 112 U/L (ref 55–135)
ALT SERPL W/O P-5'-P-CCNC: 32 U/L (ref 10–44)
AMMONIA PLAS-SCNC: 30 UMOL/L (ref 10–50)
AMPHET+METHAMPHET UR QL: NEGATIVE
ANION GAP SERPL CALC-SCNC: 13 MMOL/L (ref 8–16)
APAP SERPL-MCNC: <3 UG/ML (ref 10–20)
AST SERPL-CCNC: 42 U/L (ref 10–40)
BARBITURATES UR QL SCN>200 NG/ML: NEGATIVE
BASOPHILS # BLD AUTO: 0.05 K/UL (ref 0–0.2)
BASOPHILS NFR BLD: 0.7 % (ref 0–1.9)
BENZODIAZ UR QL SCN>200 NG/ML: NEGATIVE
BILIRUB SERPL-MCNC: 0.9 MG/DL (ref 0.1–1)
BILIRUB UR QL STRIP: NEGATIVE
BUN SERPL-MCNC: 10 MG/DL (ref 8–23)
BZE UR QL SCN: NEGATIVE
CALCIUM SERPL-MCNC: 9.2 MG/DL (ref 8.7–10.5)
CANNABINOIDS UR QL SCN: NEGATIVE
CHLORIDE SERPL-SCNC: 99 MMOL/L (ref 95–110)
CLARITY UR: CLEAR
CO2 SERPL-SCNC: 27 MMOL/L (ref 23–29)
COLOR UR: YELLOW
CREAT SERPL-MCNC: 0.8 MG/DL (ref 0.5–1.4)
CREAT UR-MCNC: 63.9 MG/DL (ref 15–325)
DIFFERENTIAL METHOD: ABNORMAL
EOSINOPHIL # BLD AUTO: 0.1 K/UL (ref 0–0.5)
EOSINOPHIL NFR BLD: 1.3 % (ref 0–8)
ERYTHROCYTE [DISTWIDTH] IN BLOOD BY AUTOMATED COUNT: 14.7 % (ref 11.5–14.5)
EST. GFR  (AFRICAN AMERICAN): >60 ML/MIN/1.73 M^2
EST. GFR  (NON AFRICAN AMERICAN): >60 ML/MIN/1.73 M^2
ETHANOL SERPL-MCNC: <10 MG/DL
GLUCOSE SERPL-MCNC: 151 MG/DL (ref 70–110)
GLUCOSE UR QL STRIP: NEGATIVE
HCT VFR BLD AUTO: 43.1 % (ref 37–48.5)
HGB BLD-MCNC: 14 G/DL (ref 12–16)
HGB UR QL STRIP: NEGATIVE
IMM GRANULOCYTES # BLD AUTO: 0.03 K/UL (ref 0–0.04)
IMM GRANULOCYTES NFR BLD AUTO: 0.4 % (ref 0–0.5)
KETONES UR QL STRIP: NEGATIVE
LEUKOCYTE ESTERASE UR QL STRIP: NEGATIVE
LYMPHOCYTES # BLD AUTO: 1.6 K/UL (ref 1–4.8)
LYMPHOCYTES NFR BLD: 23.1 % (ref 18–48)
MCH RBC QN AUTO: 29.8 PG (ref 27–31)
MCHC RBC AUTO-ENTMCNC: 32.5 G/DL (ref 32–36)
MCV RBC AUTO: 92 FL (ref 82–98)
METHADONE UR QL SCN>300 NG/ML: NEGATIVE
MONOCYTES # BLD AUTO: 0.7 K/UL (ref 0.3–1)
MONOCYTES NFR BLD: 10.2 % (ref 4–15)
NEUTROPHILS # BLD AUTO: 4.5 K/UL (ref 1.8–7.7)
NEUTROPHILS NFR BLD: 64.3 % (ref 38–73)
NITRITE UR QL STRIP: NEGATIVE
NRBC BLD-RTO: 0 /100 WBC
OPIATES UR QL SCN: NORMAL
PCP UR QL SCN>25 NG/ML: NEGATIVE
PH UR STRIP: 7 [PH] (ref 5–8)
PLATELET # BLD AUTO: 224 K/UL (ref 150–450)
PMV BLD AUTO: 10.9 FL (ref 9.2–12.9)
POCT GLUCOSE: 97 MG/DL (ref 70–110)
POTASSIUM SERPL-SCNC: 3.5 MMOL/L (ref 3.5–5.1)
PROT SERPL-MCNC: 8.4 G/DL (ref 6–8.4)
PROT UR QL STRIP: NEGATIVE
RBC # BLD AUTO: 4.7 M/UL (ref 4–5.4)
SALICYLATES SERPL-MCNC: <5 MG/DL (ref 15–30)
SODIUM SERPL-SCNC: 139 MMOL/L (ref 136–145)
SP GR UR STRIP: 1.01 (ref 1–1.03)
TOXICOLOGY INFORMATION: NORMAL
TSH SERPL DL<=0.005 MIU/L-ACNC: 1.38 UIU/ML (ref 0.4–4)
URN SPEC COLLECT METH UR: NORMAL
UROBILINOGEN UR STRIP-ACNC: NEGATIVE EU/DL
WBC # BLD AUTO: 6.93 K/UL (ref 3.9–12.7)

## 2021-01-01 PROCEDURE — 85025 COMPLETE CBC W/AUTO DIFF WBC: CPT | Performed by: EMERGENCY MEDICINE

## 2021-01-01 PROCEDURE — 93010 EKG 12-LEAD: ICD-10-PCS | Mod: ,,, | Performed by: INTERNAL MEDICINE

## 2021-01-01 PROCEDURE — 82140 ASSAY OF AMMONIA: CPT | Performed by: EMERGENCY MEDICINE

## 2021-01-01 PROCEDURE — 80143 DRUG ASSAY ACETAMINOPHEN: CPT | Performed by: EMERGENCY MEDICINE

## 2021-01-01 PROCEDURE — 82077 ASSAY SPEC XCP UR&BREATH IA: CPT | Performed by: EMERGENCY MEDICINE

## 2021-01-01 PROCEDURE — 99285 EMERGENCY DEPT VISIT HI MDM: CPT | Mod: 25

## 2021-01-01 PROCEDURE — 81003 URINALYSIS AUTO W/O SCOPE: CPT | Mod: 59 | Performed by: EMERGENCY MEDICINE

## 2021-01-01 PROCEDURE — 84443 ASSAY THYROID STIM HORMONE: CPT | Performed by: EMERGENCY MEDICINE

## 2021-01-01 PROCEDURE — 93010 ELECTROCARDIOGRAM REPORT: CPT | Mod: ,,, | Performed by: INTERNAL MEDICINE

## 2021-01-01 PROCEDURE — 93005 ELECTROCARDIOGRAM TRACING: CPT

## 2021-01-01 PROCEDURE — 82962 GLUCOSE BLOOD TEST: CPT

## 2021-01-01 PROCEDURE — 80053 COMPREHEN METABOLIC PANEL: CPT | Performed by: EMERGENCY MEDICINE

## 2021-01-01 PROCEDURE — 80179 DRUG ASSAY SALICYLATE: CPT | Performed by: EMERGENCY MEDICINE

## 2021-01-01 PROCEDURE — 80307 DRUG TEST PRSMV CHEM ANLYZR: CPT | Performed by: EMERGENCY MEDICINE

## 2021-01-01 RX ORDER — AMITRIPTYLINE HYDROCHLORIDE 25 MG/1
25 TABLET, FILM COATED ORAL NIGHTLY PRN
Status: ON HOLD | COMMUNITY
End: 2022-01-01

## 2022-01-01 ENCOUNTER — HOSPITAL ENCOUNTER (INPATIENT)
Facility: HOSPITAL | Age: 82
LOS: 1 days | Discharge: HOSPICE | DRG: 296 | End: 2022-05-16
Attending: EMERGENCY MEDICINE | Admitting: HOSPITALIST
Payer: MEDICARE

## 2022-01-01 VITALS
OXYGEN SATURATION: 96 % | SYSTOLIC BLOOD PRESSURE: 140 MMHG | RESPIRATION RATE: 28 BRPM | BODY MASS INDEX: 34.72 KG/M2 | DIASTOLIC BLOOD PRESSURE: 65 MMHG | HEART RATE: 93 BPM | WEIGHT: 216 LBS | HEIGHT: 66 IN | TEMPERATURE: 99 F

## 2022-01-01 DIAGNOSIS — Z92.89 HISTORY OF ETT: ICD-10-CM

## 2022-01-01 DIAGNOSIS — J69.0 ASPIRATION PNEUMONIA OF RIGHT LOWER LOBE, UNSPECIFIED ASPIRATION PNEUMONIA TYPE: ICD-10-CM

## 2022-01-01 DIAGNOSIS — I49.9 ARRHYTHMIA: ICD-10-CM

## 2022-01-01 DIAGNOSIS — I46.9 CARDIAC ARREST: ICD-10-CM

## 2022-01-01 DIAGNOSIS — J96.01 ACUTE HYPOXEMIC RESPIRATORY FAILURE: ICD-10-CM

## 2022-01-01 DIAGNOSIS — I16.0 HYPERTENSIVE URGENCY: ICD-10-CM

## 2022-01-01 DIAGNOSIS — I46.9 CARDIOPULMONARY ARREST: ICD-10-CM

## 2022-01-01 LAB
ABO + RH BLD: NORMAL
ALBUMIN SERPL BCP-MCNC: 2.8 G/DL (ref 3.5–5.2)
ALBUMIN SERPL BCP-MCNC: 3 G/DL (ref 3.5–5.2)
ALBUMIN SERPL BCP-MCNC: 4.1 G/DL (ref 3.5–5.2)
ALLENS TEST: ABNORMAL
ALLENS TEST: ABNORMAL
ALP SERPL-CCNC: 148 U/L (ref 55–135)
ALP SERPL-CCNC: 155 U/L (ref 55–135)
ALP SERPL-CCNC: 213 U/L (ref 55–135)
ALT SERPL W/O P-5'-P-CCNC: 128 U/L (ref 10–44)
ALT SERPL W/O P-5'-P-CCNC: 142 U/L (ref 10–44)
ALT SERPL W/O P-5'-P-CCNC: 214 U/L (ref 10–44)
AMPHET+METHAMPHET UR QL: NEGATIVE
ANION GAP SERPL CALC-SCNC: 12 MMOL/L (ref 8–16)
ANION GAP SERPL CALC-SCNC: 12 MMOL/L (ref 8–16)
ANION GAP SERPL CALC-SCNC: 14 MMOL/L (ref 8–16)
ANION GAP SERPL CALC-SCNC: 16 MMOL/L (ref 8–16)
ANION GAP SERPL CALC-SCNC: 17 MMOL/L (ref 8–16)
ANION GAP SERPL CALC-SCNC: 18 MMOL/L (ref 8–16)
AORTIC ROOT ANNULUS: 3.28 CM
APTT BLDCRRT: 27.1 SEC (ref 21–32)
APTT BLDCRRT: 31.2 SEC (ref 21–32)
AST SERPL-CCNC: 128 U/L (ref 10–40)
AST SERPL-CCNC: 160 U/L (ref 10–40)
AST SERPL-CCNC: 265 U/L (ref 10–40)
AV INDEX (PROSTH): 0.72
AV MEAN GRADIENT: 17 MMHG
AV PEAK GRADIENT: 26 MMHG
AV VALVE AREA: 2.36 CM2
AV VELOCITY RATIO: 0.62
BACTERIA #/AREA URNS HPF: ABNORMAL /HPF
BARBITURATES UR QL SCN>200 NG/ML: NEGATIVE
BASOPHILS # BLD AUTO: 0.06 K/UL (ref 0–0.2)
BASOPHILS # BLD AUTO: 0.07 K/UL (ref 0–0.2)
BASOPHILS # BLD AUTO: 0.07 K/UL (ref 0–0.2)
BASOPHILS # BLD AUTO: ABNORMAL K/UL (ref 0–0.2)
BASOPHILS NFR BLD: 0 % (ref 0–1.9)
BASOPHILS NFR BLD: 0.3 % (ref 0–1.9)
BASOPHILS NFR BLD: 0.3 % (ref 0–1.9)
BASOPHILS NFR BLD: 0.5 % (ref 0–1.9)
BENZODIAZ UR QL SCN>200 NG/ML: NEGATIVE
BILIRUB SERPL-MCNC: 0.5 MG/DL (ref 0.1–1)
BILIRUB SERPL-MCNC: 1 MG/DL (ref 0.1–1)
BILIRUB SERPL-MCNC: 1.5 MG/DL (ref 0.1–1)
BILIRUB UR QL STRIP: NEGATIVE
BLD GP AB SCN CELLS X3 SERPL QL: NORMAL
BSA FOR ECHO PROCEDURE: 2.14 M2
BUN SERPL-MCNC: 16 MG/DL (ref 8–23)
BUN SERPL-MCNC: 17 MG/DL (ref 8–23)
BUN SERPL-MCNC: 18 MG/DL (ref 8–23)
BUN SERPL-MCNC: 19 MG/DL (ref 8–23)
BUN SERPL-MCNC: 20 MG/DL (ref 8–23)
BUN SERPL-MCNC: 21 MG/DL (ref 8–23)
BUN SERPL-MCNC: 21 MG/DL (ref 8–23)
BZE UR QL SCN: NEGATIVE
CALCIUM SERPL-MCNC: 7.9 MG/DL (ref 8.7–10.5)
CALCIUM SERPL-MCNC: 8 MG/DL (ref 8.7–10.5)
CALCIUM SERPL-MCNC: 8.1 MG/DL (ref 8.7–10.5)
CALCIUM SERPL-MCNC: 8.1 MG/DL (ref 8.7–10.5)
CALCIUM SERPL-MCNC: 8.2 MG/DL (ref 8.7–10.5)
CALCIUM SERPL-MCNC: 8.2 MG/DL (ref 8.7–10.5)
CALCIUM SERPL-MCNC: 8.4 MG/DL (ref 8.7–10.5)
CALCIUM SERPL-MCNC: 9.1 MG/DL (ref 8.7–10.5)
CALCIUM SERPL-MCNC: 9.4 MG/DL (ref 8.7–10.5)
CALCIUM SERPL-MCNC: 9.8 MG/DL (ref 8.7–10.5)
CANNABINOIDS UR QL SCN: NEGATIVE
CHLORIDE SERPL-SCNC: 101 MMOL/L (ref 95–110)
CHLORIDE SERPL-SCNC: 102 MMOL/L (ref 95–110)
CHLORIDE SERPL-SCNC: 103 MMOL/L (ref 95–110)
CHLORIDE SERPL-SCNC: 104 MMOL/L (ref 95–110)
CHLORIDE SERPL-SCNC: 104 MMOL/L (ref 95–110)
CHLORIDE SERPL-SCNC: 105 MMOL/L (ref 95–110)
CK SERPL-CCNC: 169 U/L (ref 20–180)
CLARITY UR: ABNORMAL
CO2 SERPL-SCNC: 21 MMOL/L (ref 23–29)
CO2 SERPL-SCNC: 24 MMOL/L (ref 23–29)
CO2 SERPL-SCNC: 25 MMOL/L (ref 23–29)
CO2 SERPL-SCNC: 25 MMOL/L (ref 23–29)
CO2 SERPL-SCNC: 26 MMOL/L (ref 23–29)
CO2 SERPL-SCNC: 26 MMOL/L (ref 23–29)
CO2 SERPL-SCNC: 27 MMOL/L (ref 23–29)
CO2 SERPL-SCNC: 27 MMOL/L (ref 23–29)
CO2 SERPL-SCNC: 28 MMOL/L (ref 23–29)
CO2 SERPL-SCNC: 29 MMOL/L (ref 23–29)
COLOR UR: YELLOW
CREAT SERPL-MCNC: 0.9 MG/DL (ref 0.5–1.4)
CREAT SERPL-MCNC: 1 MG/DL (ref 0.5–1.4)
CREAT SERPL-MCNC: 1 MG/DL (ref 0.5–1.4)
CREAT SERPL-MCNC: 1.1 MG/DL (ref 0.5–1.4)
CREAT SERPL-MCNC: 1.3 MG/DL (ref 0.5–1.4)
CREAT UR-MCNC: 150.3 MG/DL (ref 15–325)
CTP QC/QA: YES
CV ECHO LV RWT: 0.5 CM
DELSYS: ABNORMAL
DELSYS: ABNORMAL
DIFFERENTIAL METHOD: ABNORMAL
DOP CALC AO PEAK VEL: 2.55 M/S
DOP CALC AO VTI: 50.51 CM
DOP CALC LVOT AREA: 3.3 CM2
DOP CALC LVOT DIAMETER: 2.04 CM
DOP CALC LVOT PEAK VEL: 1.59 M/S
DOP CALC LVOT STROKE VOLUME: 119.01 CM3
DOP CALC MV VTI: 28.08 CM
DOP CALCLVOT PEAK VEL VTI: 36.43 CM
E WAVE DECELERATION TIME: 202.82 MSEC
E/A RATIO: 0.67
E/E' RATIO: 9.79 M/S
ECHO LV POSTERIOR WALL: 1.15 CM (ref 0.6–1.1)
EJECTION FRACTION: 65 %
EOSINOPHIL # BLD AUTO: 0 K/UL (ref 0–0.5)
EOSINOPHIL # BLD AUTO: ABNORMAL K/UL (ref 0–0.5)
EOSINOPHIL NFR BLD: 0 % (ref 0–8)
EOSINOPHIL NFR BLD: 0.2 % (ref 0–8)
ERYTHROCYTE [DISTWIDTH] IN BLOOD BY AUTOMATED COUNT: 15.1 % (ref 11.5–14.5)
ERYTHROCYTE [DISTWIDTH] IN BLOOD BY AUTOMATED COUNT: 15.2 % (ref 11.5–14.5)
ERYTHROCYTE [DISTWIDTH] IN BLOOD BY AUTOMATED COUNT: 15.2 % (ref 11.5–14.5)
ERYTHROCYTE [DISTWIDTH] IN BLOOD BY AUTOMATED COUNT: 15.4 % (ref 11.5–14.5)
EST. GFR  (AFRICAN AMERICAN): 45 ML/MIN/1.73 M^2
EST. GFR  (AFRICAN AMERICAN): 55 ML/MIN/1.73 M^2
EST. GFR  (AFRICAN AMERICAN): >60 ML/MIN/1.73 M^2
EST. GFR  (NON AFRICAN AMERICAN): 39 ML/MIN/1.73 M^2
EST. GFR  (NON AFRICAN AMERICAN): 48 ML/MIN/1.73 M^2
EST. GFR  (NON AFRICAN AMERICAN): 54 ML/MIN/1.73 M^2
EST. GFR  (NON AFRICAN AMERICAN): 54 ML/MIN/1.73 M^2
EST. GFR  (NON AFRICAN AMERICAN): >60 ML/MIN/1.73 M^2
ETHANOL SERPL-MCNC: <10 MG/DL
FRACTIONAL SHORTENING: 46 % (ref 28–44)
GLUCOSE SERPL-MCNC: 112 MG/DL (ref 70–110)
GLUCOSE SERPL-MCNC: 112 MG/DL (ref 70–110)
GLUCOSE SERPL-MCNC: 113 MG/DL (ref 70–110)
GLUCOSE SERPL-MCNC: 116 MG/DL (ref 70–110)
GLUCOSE SERPL-MCNC: 116 MG/DL (ref 70–110)
GLUCOSE SERPL-MCNC: 121 MG/DL (ref 70–110)
GLUCOSE SERPL-MCNC: 126 MG/DL (ref 70–110)
GLUCOSE SERPL-MCNC: 141 MG/DL (ref 70–110)
GLUCOSE SERPL-MCNC: 141 MG/DL (ref 70–110)
GLUCOSE SERPL-MCNC: 160 MG/DL (ref 70–110)
GLUCOSE SERPL-MCNC: 160 MG/DL (ref 70–110)
GLUCOSE SERPL-MCNC: 167 MG/DL (ref 70–110)
GLUCOSE SERPL-MCNC: 168 MG/DL (ref 70–110)
GLUCOSE SERPL-MCNC: 168 MG/DL (ref 70–110)
GLUCOSE SERPL-MCNC: 280 MG/DL (ref 70–110)
GLUCOSE UR QL STRIP: NEGATIVE
HCO3 UR-SCNC: 21.9 MMOL/L (ref 24–28)
HCO3 UR-SCNC: 25.4 MMOL/L (ref 24–28)
HCT VFR BLD AUTO: 35 % (ref 37–48.5)
HCT VFR BLD AUTO: 40.5 % (ref 37–48.5)
HCT VFR BLD AUTO: 42.3 % (ref 37–48.5)
HCT VFR BLD AUTO: 43.2 % (ref 37–48.5)
HCT VFR BLD CALC: 32 %PCV (ref 36–54)
HGB BLD-MCNC: 10.5 G/DL (ref 12–16)
HGB BLD-MCNC: 11 G/DL
HGB BLD-MCNC: 12.7 G/DL (ref 12–16)
HGB BLD-MCNC: 13.3 G/DL (ref 12–16)
HGB BLD-MCNC: 13.8 G/DL (ref 12–16)
HGB UR QL STRIP: NEGATIVE
HYALINE CASTS #/AREA URNS LPF: 0 /LPF
IMM GRANULOCYTES # BLD AUTO: 0.16 K/UL (ref 0–0.04)
IMM GRANULOCYTES # BLD AUTO: 0.23 K/UL (ref 0–0.04)
IMM GRANULOCYTES # BLD AUTO: 0.38 K/UL (ref 0–0.04)
IMM GRANULOCYTES # BLD AUTO: ABNORMAL K/UL (ref 0–0.04)
IMM GRANULOCYTES NFR BLD AUTO: 0.7 % (ref 0–0.5)
IMM GRANULOCYTES NFR BLD AUTO: 1 % (ref 0–0.5)
IMM GRANULOCYTES NFR BLD AUTO: 2.8 % (ref 0–0.5)
IMM GRANULOCYTES NFR BLD AUTO: ABNORMAL % (ref 0–0.5)
INR PPP: 1.1 (ref 0.8–1.2)
INR PPP: 1.2 (ref 0.8–1.2)
INTERVENTRICULAR SEPTUM: 1.18 CM (ref 0.6–1.1)
KETONES UR QL STRIP: NEGATIVE
LA MAJOR: 3.62 CM
LA MINOR: 3.7 CM
LA WIDTH: 3.48 CM
LACTATE SERPL-SCNC: 2.4 MMOL/L (ref 0.5–2.2)
LACTATE SERPL-SCNC: 7.8 MMOL/L (ref 0.5–2.2)
LEFT ATRIUM SIZE: 3.72 CM
LEFT ATRIUM VOLUME INDEX MOD: 11.1 ML/M2
LEFT ATRIUM VOLUME INDEX: 19.5 ML/M2
LEFT ATRIUM VOLUME MOD: 23.02 CM3
LEFT ATRIUM VOLUME: 40.27 CM3
LEFT INTERNAL DIMENSION IN SYSTOLE: 2.46 CM (ref 2.1–4)
LEFT VENTRICLE DIASTOLIC VOLUME INDEX: 46.12 ML/M2
LEFT VENTRICLE DIASTOLIC VOLUME: 95.46 ML
LEFT VENTRICLE MASS INDEX: 94 G/M2
LEFT VENTRICLE SYSTOLIC VOLUME INDEX: 10.4 ML/M2
LEFT VENTRICLE SYSTOLIC VOLUME: 21.45 ML
LEFT VENTRICULAR INTERNAL DIMENSION IN DIASTOLE: 4.56 CM (ref 3.5–6)
LEFT VENTRICULAR MASS: 193.84 G
LEUKOCYTE ESTERASE UR QL STRIP: NEGATIVE
LV LATERAL E/E' RATIO: 9.3 M/S
LV SEPTAL E/E' RATIO: 10.33 M/S
LYMPHOCYTES # BLD AUTO: 1 K/UL (ref 1–4.8)
LYMPHOCYTES # BLD AUTO: 1.3 K/UL (ref 1–4.8)
LYMPHOCYTES # BLD AUTO: 1.7 K/UL (ref 1–4.8)
LYMPHOCYTES # BLD AUTO: ABNORMAL K/UL (ref 1–4.8)
LYMPHOCYTES NFR BLD: 23 % (ref 18–48)
LYMPHOCYTES NFR BLD: 4.4 % (ref 18–48)
LYMPHOCYTES NFR BLD: 7.2 % (ref 18–48)
LYMPHOCYTES NFR BLD: 9.8 % (ref 18–48)
MAGNESIUM SERPL-MCNC: 1.4 MG/DL (ref 1.6–2.6)
MAGNESIUM SERPL-MCNC: 1.6 MG/DL (ref 1.6–2.6)
MAGNESIUM SERPL-MCNC: 1.7 MG/DL (ref 1.6–2.6)
MAGNESIUM SERPL-MCNC: 1.8 MG/DL (ref 1.6–2.6)
MAGNESIUM SERPL-MCNC: 1.9 MG/DL (ref 1.6–2.6)
MAGNESIUM SERPL-MCNC: 2.1 MG/DL (ref 1.6–2.6)
MCH RBC QN AUTO: 29.1 PG (ref 27–31)
MCH RBC QN AUTO: 29.1 PG (ref 27–31)
MCH RBC QN AUTO: 29.4 PG (ref 27–31)
MCH RBC QN AUTO: 29.8 PG (ref 27–31)
MCHC RBC AUTO-ENTMCNC: 30 G/DL (ref 32–36)
MCHC RBC AUTO-ENTMCNC: 31.4 G/DL (ref 32–36)
MCHC RBC AUTO-ENTMCNC: 31.4 G/DL (ref 32–36)
MCHC RBC AUTO-ENTMCNC: 31.9 G/DL (ref 32–36)
MCV RBC AUTO: 92 FL (ref 82–98)
MCV RBC AUTO: 93 FL (ref 82–98)
MCV RBC AUTO: 93 FL (ref 82–98)
MCV RBC AUTO: 99 FL (ref 82–98)
METHADONE UR QL SCN>300 NG/ML: NEGATIVE
MICROSCOPIC COMMENT: ABNORMAL
MONOCYTES # BLD AUTO: 1.1 K/UL (ref 0.3–1)
MONOCYTES # BLD AUTO: 1.6 K/UL (ref 0.3–1)
MONOCYTES # BLD AUTO: 1.9 K/UL (ref 0.3–1)
MONOCYTES # BLD AUTO: ABNORMAL K/UL (ref 0.3–1)
MONOCYTES NFR BLD: 5 % (ref 4–15)
MONOCYTES NFR BLD: 7.5 % (ref 4–15)
MONOCYTES NFR BLD: 8 % (ref 4–15)
MONOCYTES NFR BLD: 8.2 % (ref 4–15)
MV MEAN GRADIENT: 1 MMHG
MV PEAK A VEL: 1.39 M/S
MV PEAK E VEL: 0.93 M/S
MV PEAK GRADIENT: 7 MMHG
MV STENOSIS PRESSURE HALF TIME: 58.82 MS
MV VALVE AREA BY CONTINUITY EQUATION: 4.24 CM2
MV VALVE AREA P 1/2 METHOD: 3.74 CM2
NEUTROPHILS # BLD AUTO: 10.5 K/UL (ref 1.8–7.7)
NEUTROPHILS # BLD AUTO: 18.9 K/UL (ref 1.8–7.7)
NEUTROPHILS # BLD AUTO: 19.4 K/UL (ref 1.8–7.7)
NEUTROPHILS NFR BLD: 59 % (ref 38–73)
NEUTROPHILS NFR BLD: 78.5 % (ref 38–73)
NEUTROPHILS NFR BLD: 83.5 % (ref 38–73)
NEUTROPHILS NFR BLD: 87.1 % (ref 38–73)
NEUTS BAND NFR BLD MANUAL: 13 %
NITRITE UR QL STRIP: NEGATIVE
NRBC BLD-RTO: 0 /100 WBC
NRBC BLD-RTO: 1 /100 WBC
OPIATES UR QL SCN: NEGATIVE
PCO2 BLDA: 35.8 MMHG (ref 35–45)
PCO2 BLDA: 53.2 MMHG (ref 35–45)
PCP UR QL SCN>25 NG/ML: NEGATIVE
PH SMN: 7.22 [PH] (ref 7.35–7.45)
PH SMN: 7.46 [PH] (ref 7.35–7.45)
PH UR STRIP: 6 [PH] (ref 5–8)
PHOSPHATE SERPL-MCNC: 1.3 MG/DL (ref 2.7–4.5)
PHOSPHATE SERPL-MCNC: 1.4 MG/DL (ref 2.7–4.5)
PHOSPHATE SERPL-MCNC: 2.1 MG/DL (ref 2.7–4.5)
PHOSPHATE SERPL-MCNC: 2.8 MG/DL (ref 2.7–4.5)
PHOSPHATE SERPL-MCNC: 2.9 MG/DL (ref 2.7–4.5)
PHOSPHATE SERPL-MCNC: 3.1 MG/DL (ref 2.7–4.5)
PHOSPHATE SERPL-MCNC: 3.1 MG/DL (ref 2.7–4.5)
PLATELET # BLD AUTO: 205 K/UL (ref 150–450)
PLATELET # BLD AUTO: 233 K/UL (ref 150–450)
PLATELET # BLD AUTO: 241 K/UL (ref 150–450)
PLATELET # BLD AUTO: 255 K/UL (ref 150–450)
PLATELET BLD QL SMEAR: ABNORMAL
PMV BLD AUTO: 11.8 FL (ref 9.2–12.9)
PMV BLD AUTO: 12 FL (ref 9.2–12.9)
PMV BLD AUTO: 12.3 FL (ref 9.2–12.9)
PMV BLD AUTO: 12.5 FL (ref 9.2–12.9)
PO2 BLDA: 133 MMHG (ref 80–100)
PO2 BLDA: 99 MMHG (ref 80–100)
POC BE: -6 MMOL/L
POC BE: 2 MMOL/L
POC SATURATED O2: 96 % (ref 95–100)
POC SATURATED O2: 99 % (ref 95–100)
POC TCO2: 24 MMOL/L (ref 23–27)
POC TCO2: 27 MMOL/L (ref 23–27)
POCT GLUCOSE: 129 MG/DL (ref 70–110)
POCT GLUCOSE: 178 MG/DL (ref 70–110)
POCT GLUCOSE: 211 MG/DL (ref 70–110)
POCT GLUCOSE: 339 MG/DL (ref 70–110)
POTASSIUM BLD-SCNC: 4.7 MMOL/L (ref 3.5–5.1)
POTASSIUM SERPL-SCNC: 3.2 MMOL/L (ref 3.5–5.1)
POTASSIUM SERPL-SCNC: 3.3 MMOL/L (ref 3.5–5.1)
POTASSIUM SERPL-SCNC: 3.3 MMOL/L (ref 3.5–5.1)
POTASSIUM SERPL-SCNC: 3.4 MMOL/L (ref 3.5–5.1)
POTASSIUM SERPL-SCNC: 3.6 MMOL/L (ref 3.5–5.1)
POTASSIUM SERPL-SCNC: 3.7 MMOL/L (ref 3.5–5.1)
POTASSIUM SERPL-SCNC: 3.7 MMOL/L (ref 3.5–5.1)
POTASSIUM SERPL-SCNC: 3.8 MMOL/L (ref 3.5–5.1)
POTASSIUM SERPL-SCNC: 3.8 MMOL/L (ref 3.5–5.1)
POTASSIUM SERPL-SCNC: 3.9 MMOL/L (ref 3.5–5.1)
POTASSIUM SERPL-SCNC: 4.2 MMOL/L (ref 3.5–5.1)
POTASSIUM SERPL-SCNC: 4.2 MMOL/L (ref 3.5–5.1)
POTASSIUM SERPL-SCNC: 5.6 MMOL/L (ref 3.5–5.1)
PROT SERPL-MCNC: 5.9 G/DL (ref 6–8.4)
PROT SERPL-MCNC: 6.4 G/DL (ref 6–8.4)
PROT SERPL-MCNC: 7.9 G/DL (ref 6–8.4)
PROT UR QL STRIP: ABNORMAL
PROTHROMBIN TIME: 11 SEC (ref 9–12.5)
PROTHROMBIN TIME: 12.5 SEC (ref 9–12.5)
PV PEAK VELOCITY: 1.31 CM/S
RA MAJOR: 4.91 CM
RA PRESSURE: 3 MMHG
RA WIDTH: 2.91 CM
RBC # BLD AUTO: 3.52 M/UL (ref 4–5.4)
RBC # BLD AUTO: 4.37 M/UL (ref 4–5.4)
RBC # BLD AUTO: 4.57 M/UL (ref 4–5.4)
RBC # BLD AUTO: 4.7 M/UL (ref 4–5.4)
RBC #/AREA URNS HPF: 10 /HPF (ref 0–4)
RIGHT VENTRICULAR END-DIASTOLIC DIMENSION: 3.42 CM
RV TISSUE DOPPLER FREE WALL SYSTOLIC VELOCITY 1 (APICAL 4 CHAMBER VIEW): 18.18 CM/S
SAMPLE: ABNORMAL
SAMPLE: ABNORMAL
SARS-COV-2 RDRP RESP QL NAA+PROBE: NEGATIVE
SITE: ABNORMAL
SITE: ABNORMAL
SODIUM BLD-SCNC: 138 MMOL/L (ref 136–145)
SODIUM SERPL-SCNC: 141 MMOL/L (ref 136–145)
SODIUM SERPL-SCNC: 143 MMOL/L (ref 136–145)
SODIUM SERPL-SCNC: 144 MMOL/L (ref 136–145)
SODIUM SERPL-SCNC: 145 MMOL/L (ref 136–145)
SP GR UR STRIP: 1.02 (ref 1–1.03)
SQUAMOUS #/AREA URNS HPF: 0 /HPF
T4 FREE SERPL-MCNC: 0.75 NG/DL (ref 0.71–1.51)
TDI LATERAL: 0.1 M/S
TDI SEPTAL: 0.09 M/S
TDI: 0.1 M/S
TOXICOLOGY INFORMATION: NORMAL
TRICUSPID ANNULAR PLANE SYSTOLIC EXCURSION: 1.71 CM
TROPONIN I SERPL DL<=0.01 NG/ML-MCNC: 0.02 NG/ML (ref 0–0.03)
TROPONIN I SERPL DL<=0.01 NG/ML-MCNC: 0.05 NG/ML (ref 0–0.03)
TSH SERPL DL<=0.005 MIU/L-ACNC: 4.86 UIU/ML (ref 0.4–4)
URN SPEC COLLECT METH UR: ABNORMAL
UROBILINOGEN UR STRIP-ACNC: NEGATIVE EU/DL
WBC # BLD AUTO: 13.37 K/UL (ref 3.9–12.7)
WBC # BLD AUTO: 19.83 K/UL (ref 3.9–12.7)
WBC # BLD AUTO: 21.68 K/UL (ref 3.9–12.7)
WBC # BLD AUTO: 23.24 K/UL (ref 3.9–12.7)
WBC #/AREA URNS HPF: 10 /HPF (ref 0–5)

## 2022-01-01 PROCEDURE — 80048 BASIC METABOLIC PNL TOTAL CA: CPT | Mod: XB | Performed by: EMERGENCY MEDICINE

## 2022-01-01 PROCEDURE — 83735 ASSAY OF MAGNESIUM: CPT | Mod: 91 | Performed by: EMERGENCY MEDICINE

## 2022-01-01 PROCEDURE — 94002 VENT MGMT INPAT INIT DAY: CPT

## 2022-01-01 PROCEDURE — 80048 BASIC METABOLIC PNL TOTAL CA: CPT | Mod: 91,XB | Performed by: EMERGENCY MEDICINE

## 2022-01-01 PROCEDURE — 99223 PR INITIAL HOSPITAL CARE,LEVL III: ICD-10-PCS | Mod: ,,, | Performed by: STUDENT IN AN ORGANIZED HEALTH CARE EDUCATION/TRAINING PROGRAM

## 2022-01-01 PROCEDURE — U0002 COVID-19 LAB TEST NON-CDC: HCPCS | Performed by: EMERGENCY MEDICINE

## 2022-01-01 PROCEDURE — 63600175 PHARM REV CODE 636 W HCPCS: Performed by: INTERNAL MEDICINE

## 2022-01-01 PROCEDURE — 63600175 PHARM REV CODE 636 W HCPCS: Performed by: HOSPITALIST

## 2022-01-01 PROCEDURE — 99223 1ST HOSP IP/OBS HIGH 75: CPT | Mod: ,,, | Performed by: STUDENT IN AN ORGANIZED HEALTH CARE EDUCATION/TRAINING PROGRAM

## 2022-01-01 PROCEDURE — 85610 PROTHROMBIN TIME: CPT | Performed by: EMERGENCY MEDICINE

## 2022-01-01 PROCEDURE — 99900035 HC TECH TIME PER 15 MIN (STAT)

## 2022-01-01 PROCEDURE — 27200966 HC CLOSED SUCTION SYSTEM

## 2022-01-01 PROCEDURE — 94003 VENT MGMT INPAT SUBQ DAY: CPT

## 2022-01-01 PROCEDURE — 99497 PR ADVNCD CARE PLAN 30 MIN: ICD-10-PCS | Mod: 25,,, | Performed by: STUDENT IN AN ORGANIZED HEALTH CARE EDUCATION/TRAINING PROGRAM

## 2022-01-01 PROCEDURE — 83735 ASSAY OF MAGNESIUM: CPT | Mod: 91 | Performed by: HOSPITALIST

## 2022-01-01 PROCEDURE — 25000003 PHARM REV CODE 250: Performed by: EMERGENCY MEDICINE

## 2022-01-01 PROCEDURE — 83520 IMMUNOASSAY QUANT NOS NONAB: CPT | Performed by: EMERGENCY MEDICINE

## 2022-01-01 PROCEDURE — 93005 ELECTROCARDIOGRAM TRACING: CPT

## 2022-01-01 PROCEDURE — C9113 INJ PANTOPRAZOLE SODIUM, VIA: HCPCS | Performed by: HOSPITALIST

## 2022-01-01 PROCEDURE — 25000003 PHARM REV CODE 250: Performed by: INTERNAL MEDICINE

## 2022-01-01 PROCEDURE — 99291 CRITICAL CARE FIRST HOUR: CPT | Mod: ,,, | Performed by: INTERNAL MEDICINE

## 2022-01-01 PROCEDURE — 99497 ADVNCD CARE PLAN 30 MIN: CPT | Mod: 25,,, | Performed by: STUDENT IN AN ORGANIZED HEALTH CARE EDUCATION/TRAINING PROGRAM

## 2022-01-01 PROCEDURE — 27201640 HC PAD, ARTICGEL

## 2022-01-01 PROCEDURE — 80048 BASIC METABOLIC PNL TOTAL CA: CPT | Mod: 91,XB | Performed by: HOSPITALIST

## 2022-01-01 PROCEDURE — 93010 EKG 12-LEAD: ICD-10-PCS | Mod: ,,, | Performed by: INTERNAL MEDICINE

## 2022-01-01 PROCEDURE — 20000000 HC ICU ROOM

## 2022-01-01 PROCEDURE — 99292 CRITICAL CARE ADDL 30 MIN: CPT

## 2022-01-01 PROCEDURE — 85730 THROMBOPLASTIN TIME PARTIAL: CPT | Performed by: EMERGENCY MEDICINE

## 2022-01-01 PROCEDURE — 84443 ASSAY THYROID STIM HORMONE: CPT | Performed by: EMERGENCY MEDICINE

## 2022-01-01 PROCEDURE — 82550 ASSAY OF CK (CPK): CPT | Performed by: EMERGENCY MEDICINE

## 2022-01-01 PROCEDURE — 25000003 PHARM REV CODE 250: Performed by: STUDENT IN AN ORGANIZED HEALTH CARE EDUCATION/TRAINING PROGRAM

## 2022-01-01 PROCEDURE — 85025 COMPLETE CBC W/AUTO DIFF WBC: CPT | Performed by: EMERGENCY MEDICINE

## 2022-01-01 PROCEDURE — 84100 ASSAY OF PHOSPHORUS: CPT | Mod: 91 | Performed by: EMERGENCY MEDICINE

## 2022-01-01 PROCEDURE — 94761 N-INVAS EAR/PLS OXIMETRY MLT: CPT

## 2022-01-01 PROCEDURE — 82947 ASSAY GLUCOSE BLOOD QUANT: CPT | Performed by: EMERGENCY MEDICINE

## 2022-01-01 PROCEDURE — 95816 EEG AWAKE AND DROWSY: CPT | Mod: 26,,, | Performed by: PSYCHIATRY & NEUROLOGY

## 2022-01-01 PROCEDURE — 36415 COLL VENOUS BLD VENIPUNCTURE: CPT | Performed by: EMERGENCY MEDICINE

## 2022-01-01 PROCEDURE — 95816 PR EEG,W/AWAKE & DROWSY RECORD: ICD-10-PCS | Mod: 26,,, | Performed by: PSYCHIATRY & NEUROLOGY

## 2022-01-01 PROCEDURE — 63600175 PHARM REV CODE 636 W HCPCS: Performed by: EMERGENCY MEDICINE

## 2022-01-01 PROCEDURE — 36600 WITHDRAWAL OF ARTERIAL BLOOD: CPT

## 2022-01-01 PROCEDURE — 93010 ELECTROCARDIOGRAM REPORT: CPT | Mod: ,,, | Performed by: INTERNAL MEDICINE

## 2022-01-01 PROCEDURE — 84484 ASSAY OF TROPONIN QUANT: CPT | Mod: 91 | Performed by: EMERGENCY MEDICINE

## 2022-01-01 PROCEDURE — 96365 THER/PROPH/DIAG IV INF INIT: CPT

## 2022-01-01 PROCEDURE — 83605 ASSAY OF LACTIC ACID: CPT | Performed by: EMERGENCY MEDICINE

## 2022-01-01 PROCEDURE — 82077 ASSAY SPEC XCP UR&BREATH IA: CPT | Performed by: EMERGENCY MEDICINE

## 2022-01-01 PROCEDURE — 87205 SMEAR GRAM STAIN: CPT | Performed by: EMERGENCY MEDICINE

## 2022-01-01 PROCEDURE — 81000 URINALYSIS NONAUTO W/SCOPE: CPT | Mod: 59 | Performed by: EMERGENCY MEDICINE

## 2022-01-01 PROCEDURE — 63600175 PHARM REV CODE 636 W HCPCS: Performed by: STUDENT IN AN ORGANIZED HEALTH CARE EDUCATION/TRAINING PROGRAM

## 2022-01-01 PROCEDURE — 80053 COMPREHEN METABOLIC PANEL: CPT | Performed by: NURSE PRACTITIONER

## 2022-01-01 PROCEDURE — 84100 ASSAY OF PHOSPHORUS: CPT | Mod: 91 | Performed by: HOSPITALIST

## 2022-01-01 PROCEDURE — 83735 ASSAY OF MAGNESIUM: CPT | Performed by: NURSE PRACTITIONER

## 2022-01-01 PROCEDURE — 27000221 HC OXYGEN, UP TO 24 HOURS

## 2022-01-01 PROCEDURE — 99291 CRITICAL CARE FIRST HOUR: CPT | Mod: 25

## 2022-01-01 PROCEDURE — 63600175 PHARM REV CODE 636 W HCPCS: Performed by: NURSE PRACTITIONER

## 2022-01-01 PROCEDURE — 85007 BL SMEAR W/DIFF WBC COUNT: CPT | Performed by: EMERGENCY MEDICINE

## 2022-01-01 PROCEDURE — 83605 ASSAY OF LACTIC ACID: CPT | Mod: 91 | Performed by: NURSE PRACTITIONER

## 2022-01-01 PROCEDURE — 95816 EEG AWAKE AND DROWSY: CPT

## 2022-01-01 PROCEDURE — 87040 BLOOD CULTURE FOR BACTERIA: CPT | Mod: 59 | Performed by: EMERGENCY MEDICINE

## 2022-01-01 PROCEDURE — 87086 URINE CULTURE/COLONY COUNT: CPT | Performed by: EMERGENCY MEDICINE

## 2022-01-01 PROCEDURE — 84100 ASSAY OF PHOSPHORUS: CPT | Performed by: EMERGENCY MEDICINE

## 2022-01-01 PROCEDURE — 85027 COMPLETE CBC AUTOMATED: CPT | Performed by: EMERGENCY MEDICINE

## 2022-01-01 PROCEDURE — 25000003 PHARM REV CODE 250: Performed by: NURSE PRACTITIONER

## 2022-01-01 PROCEDURE — 25000003 PHARM REV CODE 250: Performed by: HOSPITALIST

## 2022-01-01 PROCEDURE — 80053 COMPREHEN METABOLIC PANEL: CPT | Mod: 91 | Performed by: NURSE PRACTITIONER

## 2022-01-01 PROCEDURE — 27000190 HC CPAP FULL FACE MASK W/VALVE

## 2022-01-01 PROCEDURE — 86901 BLOOD TYPING SEROLOGIC RH(D): CPT | Performed by: EMERGENCY MEDICINE

## 2022-01-01 PROCEDURE — 87070 CULTURE OTHR SPECIMN AEROBIC: CPT | Performed by: EMERGENCY MEDICINE

## 2022-01-01 PROCEDURE — 82962 GLUCOSE BLOOD TEST: CPT

## 2022-01-01 PROCEDURE — 94660 CPAP INITIATION&MGMT: CPT

## 2022-01-01 PROCEDURE — 82803 BLOOD GASES ANY COMBINATION: CPT

## 2022-01-01 PROCEDURE — 96366 THER/PROPH/DIAG IV INF ADDON: CPT

## 2022-01-01 PROCEDURE — 80053 COMPREHEN METABOLIC PANEL: CPT | Performed by: EMERGENCY MEDICINE

## 2022-01-01 PROCEDURE — 84439 ASSAY OF FREE THYROXINE: CPT | Performed by: EMERGENCY MEDICINE

## 2022-01-01 PROCEDURE — 85025 COMPLETE CBC W/AUTO DIFF WBC: CPT | Mod: 91 | Performed by: HOSPITALIST

## 2022-01-01 PROCEDURE — 80307 DRUG TEST PRSMV CHEM ANLYZR: CPT | Performed by: EMERGENCY MEDICINE

## 2022-01-01 PROCEDURE — 99291 PR CRITICAL CARE, E/M 30-74 MINUTES: ICD-10-PCS | Mod: ,,, | Performed by: INTERNAL MEDICINE

## 2022-01-01 RX ORDER — TRAZODONE HYDROCHLORIDE 100 MG/1
100 TABLET ORAL NIGHTLY
COMMUNITY
Start: 2022-01-01

## 2022-01-01 RX ORDER — SODIUM CHLORIDE 9 MG/ML
INJECTION, SOLUTION INTRAVENOUS CONTINUOUS
Status: DISCONTINUED | OUTPATIENT
Start: 2022-01-01 | End: 2022-01-01

## 2022-01-01 RX ORDER — BUSPIRONE HYDROCHLORIDE 5 MG/1
30 TABLET ORAL ONCE
Status: COMPLETED | OUTPATIENT
Start: 2022-01-01 | End: 2022-01-01

## 2022-01-01 RX ORDER — PROPOFOL 10 MG/ML
0-50 INJECTION, EMULSION INTRAVENOUS CONTINUOUS
Status: DISCONTINUED | OUTPATIENT
Start: 2022-01-01 | End: 2022-01-01

## 2022-01-01 RX ORDER — ACETAMINOPHEN 650 MG/20.3ML
650 LIQUID ORAL EVERY 6 HOURS
Status: DISCONTINUED | OUTPATIENT
Start: 2022-01-01 | End: 2022-01-01 | Stop reason: HOSPADM

## 2022-01-01 RX ORDER — CALCIUM GLUCONATE 20 MG/ML
1 INJECTION, SOLUTION INTRAVENOUS
Status: COMPLETED | OUTPATIENT
Start: 2022-01-01 | End: 2022-01-01

## 2022-01-01 RX ORDER — OXCARBAZEPINE 150 MG/1
150 TABLET, FILM COATED ORAL DAILY
COMMUNITY

## 2022-01-01 RX ORDER — ACETAMINOPHEN 325 MG/1
650 TABLET ORAL EVERY 4 HOURS PRN
Status: DISCONTINUED | OUTPATIENT
Start: 2022-01-01 | End: 2022-01-01 | Stop reason: HOSPADM

## 2022-01-01 RX ORDER — MAG HYDROX/ALUMINUM HYD/SIMETH 200-200-20
30 SUSPENSION, ORAL (FINAL DOSE FORM) ORAL EVERY 4 HOURS PRN
COMMUNITY

## 2022-01-01 RX ORDER — FAMOTIDINE 10 MG/ML
20 INJECTION INTRAVENOUS DAILY
Status: DISCONTINUED | OUTPATIENT
Start: 2022-01-01 | End: 2022-01-01

## 2022-01-01 RX ORDER — ADHESIVE BANDAGE
30 BANDAGE TOPICAL DAILY PRN
COMMUNITY

## 2022-01-01 RX ORDER — SODIUM,POTASSIUM PHOSPHATES 280-250MG
2 POWDER IN PACKET (EA) ORAL
Status: DISCONTINUED | OUTPATIENT
Start: 2022-01-01 | End: 2022-01-01

## 2022-01-01 RX ORDER — AMOXICILLIN AND CLAVULANATE POTASSIUM 500; 125 MG/1; MG/1
1 TABLET, FILM COATED ORAL 2 TIMES DAILY
COMMUNITY
Start: 2022-01-01 | End: 2022-05-18

## 2022-01-01 RX ORDER — DEXTROMETHORPHAN HYDROBROMIDE AND QUINIDINE SULFATE 20; 10 MG/1; MG/1
1 CAPSULE, GELATIN COATED ORAL 2 TIMES DAILY
COMMUNITY
Start: 2022-01-01

## 2022-01-01 RX ORDER — SODIUM CHLORIDE 0.9 % (FLUSH) 0.9 %
10 SYRINGE (ML) INJECTION
Status: DISCONTINUED | OUTPATIENT
Start: 2022-01-01 | End: 2022-01-01 | Stop reason: HOSPADM

## 2022-01-01 RX ORDER — RISPERIDONE 2 MG/1
2 TABLET ORAL 2 TIMES DAILY
Status: ON HOLD | COMMUNITY
Start: 2022-01-01 | End: 2022-01-01

## 2022-01-01 RX ORDER — SERTRALINE HYDROCHLORIDE 50 MG/1
50 TABLET, FILM COATED ORAL DAILY
COMMUNITY

## 2022-01-01 RX ORDER — HYDRALAZINE HYDROCHLORIDE 20 MG/ML
10 INJECTION INTRAMUSCULAR; INTRAVENOUS EVERY 6 HOURS PRN
Status: DISCONTINUED | OUTPATIENT
Start: 2022-01-01 | End: 2022-01-01 | Stop reason: HOSPADM

## 2022-01-01 RX ORDER — ATORVASTATIN CALCIUM 10 MG/1
10 TABLET, FILM COATED ORAL NIGHTLY
COMMUNITY
Start: 2022-01-01

## 2022-01-01 RX ORDER — SODIUM CHLORIDE 9 MG/ML
INJECTION, SOLUTION INTRAVENOUS CONTINUOUS
Status: DISCONTINUED | OUTPATIENT
Start: 2022-01-01 | End: 2022-01-01 | Stop reason: HOSPADM

## 2022-01-01 RX ORDER — HYDRALAZINE HYDROCHLORIDE 25 MG/1
25 TABLET, FILM COATED ORAL EVERY 8 HOURS
Status: DISCONTINUED | OUTPATIENT
Start: 2022-01-01 | End: 2022-01-01 | Stop reason: HOSPADM

## 2022-01-01 RX ORDER — AMLODIPINE BESYLATE 5 MG/1
5 TABLET ORAL DAILY
COMMUNITY

## 2022-01-01 RX ORDER — MUPIROCIN 20 MG/G
OINTMENT TOPICAL 2 TIMES DAILY
Status: DISCONTINUED | OUTPATIENT
Start: 2022-01-01 | End: 2022-01-01 | Stop reason: HOSPADM

## 2022-01-01 RX ORDER — NICARDIPINE HYDROCHLORIDE 0.2 MG/ML
0-15 INJECTION INTRAVENOUS CONTINUOUS
Status: DISCONTINUED | OUTPATIENT
Start: 2022-01-01 | End: 2022-01-01 | Stop reason: HOSPADM

## 2022-01-01 RX ORDER — OXCARBAZEPINE 300 MG/1
150 TABLET, FILM COATED ORAL 2 TIMES DAILY
COMMUNITY

## 2022-01-01 RX ORDER — AMITRIPTYLINE HYDROCHLORIDE 50 MG/1
50 TABLET, FILM COATED ORAL NIGHTLY
COMMUNITY
Start: 2022-01-01

## 2022-01-01 RX ORDER — HALOPERIDOL 5 MG/1
10 TABLET ORAL 3 TIMES DAILY
COMMUNITY

## 2022-01-01 RX ORDER — ENOXAPARIN SODIUM 100 MG/ML
40 INJECTION SUBCUTANEOUS EVERY 24 HOURS
Status: DISCONTINUED | OUTPATIENT
Start: 2022-01-01 | End: 2022-01-01 | Stop reason: HOSPADM

## 2022-01-01 RX ORDER — MAGNESIUM SULFATE HEPTAHYDRATE 40 MG/ML
2 INJECTION, SOLUTION INTRAVENOUS
Status: COMPLETED | OUTPATIENT
Start: 2022-01-01 | End: 2022-01-01

## 2022-01-01 RX ORDER — PANTOPRAZOLE SODIUM 40 MG/10ML
40 INJECTION, POWDER, LYOPHILIZED, FOR SOLUTION INTRAVENOUS 2 TIMES DAILY
Status: DISCONTINUED | OUTPATIENT
Start: 2022-01-01 | End: 2022-01-01 | Stop reason: HOSPADM

## 2022-01-01 RX ORDER — ACETAMINOPHEN 325 MG/1
650 TABLET ORAL EVERY 4 HOURS PRN
COMMUNITY

## 2022-01-01 RX ORDER — MAGNESIUM SULFATE HEPTAHYDRATE 40 MG/ML
2 INJECTION, SOLUTION INTRAVENOUS ONCE
Status: COMPLETED | OUTPATIENT
Start: 2022-01-01 | End: 2022-01-01

## 2022-01-01 RX ORDER — POTASSIUM CHLORIDE 20 MEQ/1
40 TABLET, EXTENDED RELEASE ORAL EVERY 4 HOURS
Status: DISCONTINUED | OUTPATIENT
Start: 2022-01-01 | End: 2022-01-01 | Stop reason: HOSPADM

## 2022-01-01 RX ORDER — ONDANSETRON 2 MG/ML
4 INJECTION INTRAMUSCULAR; INTRAVENOUS EVERY 8 HOURS PRN
Status: DISCONTINUED | OUTPATIENT
Start: 2022-01-01 | End: 2022-01-01 | Stop reason: HOSPADM

## 2022-01-01 RX ORDER — FAMCICLOVIR 500 MG/1
500 TABLET ORAL 3 TIMES DAILY
COMMUNITY
Start: 2022-01-01 | End: 2022-01-01

## 2022-01-01 RX ORDER — HALOPERIDOL 5 MG/1
2.5 TABLET ORAL EVERY 6 HOURS PRN
COMMUNITY

## 2022-01-01 RX ORDER — AMLODIPINE BESYLATE 5 MG/1
5 TABLET ORAL DAILY
Status: DISCONTINUED | OUTPATIENT
Start: 2022-01-01 | End: 2022-01-01 | Stop reason: HOSPADM

## 2022-01-01 RX ADMIN — MUPIROCIN: 20 OINTMENT TOPICAL at 08:05

## 2022-01-01 RX ADMIN — MAGNESIUM SULFATE 2 G: 2 INJECTION INTRAVENOUS at 02:05

## 2022-01-01 RX ADMIN — PROPOFOL 10 MCG/KG/MIN: 10 INJECTION, EMULSION INTRAVENOUS at 03:05

## 2022-01-01 RX ADMIN — POTASSIUM CHLORIDE 40 MEQ: 20 TABLET, EXTENDED RELEASE ORAL at 04:05

## 2022-01-01 RX ADMIN — ENOXAPARIN SODIUM 40 MG: 100 INJECTION SUBCUTANEOUS at 04:05

## 2022-01-01 RX ADMIN — NICARDIPINE HYDROCHLORIDE 15 MG/HR: 0.2 INJECTION, SOLUTION INTRAVENOUS at 11:05

## 2022-01-01 RX ADMIN — BUSPIRONE HYDROCHLORIDE 30 MG: 5 TABLET ORAL at 06:05

## 2022-01-01 RX ADMIN — AZITHROMYCIN MONOHYDRATE 500 MG: 500 INJECTION, POWDER, LYOPHILIZED, FOR SOLUTION INTRAVENOUS at 12:05

## 2022-01-01 RX ADMIN — PANTOPRAZOLE SODIUM 40 MG: 40 INJECTION, POWDER, FOR SOLUTION INTRAVENOUS at 08:05

## 2022-01-01 RX ADMIN — SODIUM CHLORIDE: 0.9 INJECTION, SOLUTION INTRAVENOUS at 07:05

## 2022-01-01 RX ADMIN — PIPERACILLIN AND TAZOBACTAM 4.5 G: 4; .5 INJECTION, POWDER, LYOPHILIZED, FOR SOLUTION INTRAVENOUS; PARENTERAL at 04:05

## 2022-01-01 RX ADMIN — HYDRALAZINE HYDROCHLORIDE 10 MG: 20 INJECTION, SOLUTION INTRAMUSCULAR; INTRAVENOUS at 08:05

## 2022-01-01 RX ADMIN — PIPERACILLIN AND TAZOBACTAM 4.5 G: 4; .5 INJECTION, POWDER, LYOPHILIZED, FOR SOLUTION INTRAVENOUS; PARENTERAL at 08:05

## 2022-01-01 RX ADMIN — DEXTROSE 250 ML: 10 SOLUTION INTRAVENOUS at 06:05

## 2022-01-01 RX ADMIN — PIPERACILLIN AND TAZOBACTAM 4.5 G: 4; .5 INJECTION, POWDER, LYOPHILIZED, FOR SOLUTION INTRAVENOUS; PARENTERAL at 01:05

## 2022-01-01 RX ADMIN — NICARDIPINE HYDROCHLORIDE 5 MG/HR: 0.2 INJECTION, SOLUTION INTRAVENOUS at 08:05

## 2022-01-01 RX ADMIN — INSULIN HUMAN 9.8 UNITS: 100 INJECTION, SOLUTION PARENTERAL at 06:05

## 2022-01-01 RX ADMIN — CALCIUM GLUCONATE 1 G: 20 INJECTION, SOLUTION INTRAVENOUS at 03:05

## 2022-01-01 RX ADMIN — NICARDIPINE HYDROCHLORIDE 10 MG/HR: 0.2 INJECTION, SOLUTION INTRAVENOUS at 08:05

## 2022-01-01 RX ADMIN — PIPERACILLIN AND TAZOBACTAM 4.5 G: 4; .5 INJECTION, POWDER, LYOPHILIZED, FOR SOLUTION INTRAVENOUS; PARENTERAL at 07:05

## 2022-01-01 RX ADMIN — NICARDIPINE HYDROCHLORIDE 15 MG/HR: 0.2 INJECTION, SOLUTION INTRAVENOUS at 05:05

## 2022-01-01 RX ADMIN — SODIUM ZIRCONIUM CYCLOSILICATE 10 G: 5 POWDER, FOR SUSPENSION ORAL at 08:05

## 2022-01-01 RX ADMIN — CEFTRIAXONE 1 G: 1 INJECTION, SOLUTION INTRAVENOUS at 04:05

## 2022-01-01 RX ADMIN — ACETAMINOPHEN ORAL SOLUTION 650 MG: 650 SOLUTION ORAL at 12:05

## 2022-01-01 RX ADMIN — PANTOPRAZOLE SODIUM 40 MG: 40 INJECTION, POWDER, FOR SOLUTION INTRAVENOUS at 02:05

## 2022-01-01 RX ADMIN — NICARDIPINE HYDROCHLORIDE 7.5 MG/HR: 0.2 INJECTION, SOLUTION INTRAVENOUS at 03:05

## 2022-01-01 RX ADMIN — ACETAMINOPHEN ORAL SOLUTION 650 MG: 650 SOLUTION ORAL at 11:05

## 2022-01-01 RX ADMIN — POTASSIUM & SODIUM PHOSPHATES POWDER PACK 280-160-250 MG 2 PACKET: 280-160-250 PACK at 04:05

## 2022-01-01 RX ADMIN — POTASSIUM BICARBONATE 40 MEQ: 391 TABLET, EFFERVESCENT ORAL at 08:05

## 2022-01-01 RX ADMIN — FAMOTIDINE 20 MG: 10 INJECTION, SOLUTION INTRAVENOUS at 08:05

## 2022-01-01 RX ADMIN — ACETAMINOPHEN ORAL SOLUTION 650 MG: 650 SOLUTION ORAL at 05:05

## 2022-01-01 RX ADMIN — AMLODIPINE BESYLATE 5 MG: 5 TABLET ORAL at 03:05

## 2022-01-01 RX ADMIN — POTASSIUM & SODIUM PHOSPHATES POWDER PACK 280-160-250 MG 2 PACKET: 280-160-250 PACK at 08:05

## 2022-01-01 RX ADMIN — HYDRALAZINE HYDROCHLORIDE 10 MG: 20 INJECTION, SOLUTION INTRAMUSCULAR; INTRAVENOUS at 06:05

## 2022-01-01 RX ADMIN — HYDRALAZINE HYDROCHLORIDE 25 MG: 25 TABLET, FILM COATED ORAL at 03:05

## 2022-01-01 RX ADMIN — SODIUM CHLORIDE: 0.9 INJECTION, SOLUTION INTRAVENOUS at 06:05

## 2022-01-01 RX ADMIN — ACETAMINOPHEN ORAL SOLUTION 650 MG: 650 SOLUTION ORAL at 06:05

## 2022-01-01 RX ADMIN — POTASSIUM BICARBONATE 40 MEQ: 391 TABLET, EFFERVESCENT ORAL at 11:05

## 2022-01-01 RX ADMIN — NICARDIPINE HYDROCHLORIDE 15 MG/HR: 0.2 INJECTION, SOLUTION INTRAVENOUS at 02:05

## 2022-01-01 RX ADMIN — ACETAMINOPHEN ORAL SOLUTION 650 MG: 650 SOLUTION ORAL at 04:05

## 2022-05-15 PROBLEM — I46.9 CARDIAC ARREST: Status: ACTIVE | Noted: 2022-01-01

## 2022-05-15 PROBLEM — D72.829 LEUKOCYTOSIS: Status: ACTIVE | Noted: 2022-01-01

## 2022-05-15 PROBLEM — G93.1 ANOXIC BRAIN INJURY: Status: ACTIVE | Noted: 2022-01-01

## 2022-05-15 PROBLEM — E78.5 HYPERLIPIDEMIA: Status: ACTIVE | Noted: 2022-01-01

## 2022-05-15 PROBLEM — E87.20 LACTIC ACIDOSIS: Status: ACTIVE | Noted: 2022-01-01

## 2022-05-15 PROBLEM — I48.91 A-FIB: Status: ACTIVE | Noted: 2022-01-01

## 2022-05-15 PROBLEM — F43.20 ANACLITIC DEPRESSION: Status: ACTIVE | Noted: 2022-01-01

## 2022-05-15 PROBLEM — R74.8 ELEVATED LIVER ENZYMES: Status: ACTIVE | Noted: 2022-01-01

## 2022-05-15 NOTE — ASSESSMENT & PLAN NOTE
S/p arrest  Initial EKG with Afib, now appears to be back in NSR  -repeat EKG pending  -consult cardiology

## 2022-05-15 NOTE — NURSING
Pt BP remains elevated. Systolic between 190-220. PRN hydralazine was given by previous RN at 0635. MD notified. Will place new orders.

## 2022-05-15 NOTE — CONSULTS
Consult Note  [facility]    Attending: Xavier Pace, *  Fellow: Anand  Admit Date: 5/15/2022  Today's Date: 05/15/2022  Reason for Consult:  Cardiac arrest; intubated    SUBJECTIVE:     HPI: patient is a 81 year old female with PMHx of bipolar disorder, diastolic heart failure, HTN, anemia admitted for cardiopulmonary arrest. Patient was brought in by EMS after being intubated and required ACLS intervention with ROSC achieved after 30 minutes. Unsure of how long patient was found down prior to ACLS initiated. Patient was started on propofol and hypothermic protocol initiated in ED. Patient started on nicardipine drip due to labile pressures. CT head showed anoxic brain injury. Patient admitted to ICU for cardiopulmonary arrest and intubation.     Review of patient's allergies indicates:  No Known Allergies    Past Medical History:   Diagnosis Date    Anemia     Bipolar disorder     Chronic diastolic heart failure     Chronic pain     Dementia     Depression     E coli bacteremia 9/21/2018    Extrapyramidal and movement disorder     Glaucoma     Hyperlipidemia     Hypertension     Pyelonephritis 9/21/2018    Right nephrolithiasis 9/21/2018    Scabies 05/05/2017     Past Surgical History:   Procedure Laterality Date    CYSTOSCOPY W/ URETERAL STENT PLACEMENT Right 9/27/2018    Procedure: CYSTOSCOPY, WITH URETERAL STENT INSERTION, possible retrograde pyelogram;  Surgeon: Laura Sorenson MD;  Location: [facility];  Service: Urology;  Laterality: Right;     History reviewed. No pertinent family history.  Social History     Tobacco Use    Smoking status: Unknown If Ever Smoked    Smokeless tobacco: Never Used       All medications reviewed.    Review of Systems   Unable to perform ROS: Intubated       OBJECTIVE:     Vital Signs Trends/Hx Reviewed  Vitals:    05/15/22 1145 05/15/22 1200 05/15/22 1215 05/15/22 1230   BP: (!) 185/83 (!) 172/76 (!) 169/74 (!) 165/74   BP Location:        Patient Position:       Pulse: 80 80 79 80   Resp: (!) 27 (!) 23 (!) 28 (!) 27   Temp:  96.62 °F (35.9 °C)     TempSrc:  Core Esophageal     SpO2: 96% 96% 96% 96%   Weight:       Height:           Ventilator settings: Vent Mode: A/CMV-VC  Oxygen Concentration (%):  [] 40  Resp Rate Total:  [20 br/min-28 br/min] 27 br/min  Vt Set:  [450 mL] 450 mL  PEEP/CPAP:  [5 cmH20] 5 cmH20  Mean Airway Pressure:  [10.7 umQ81-88.6 cmH20] 11.3 cmH20     Physical Exam:  General: NAD, intubated and sedated  HEENT: AT/NC; equal fixed pupils, 2-3 mm  Cardiac: normal rate, regular rhythm, with no MRG with brisk cap refill and symmetric pulses in distal extremities.  Respiratory: intubated with coarse breathe sounds  Abdomen: Soft, no distention  Extremities: No edema.   Neuro: unresponsive to painful stimuli; upper and lower extremity clonus noted with upper extremity rigidity      Laboratory:  Recent Labs   Lab 05/15/22  0445   PH 7.460*   PCO2 35.8   PO2 133*   HCO3 25.4   POCSATURATED 99   BE 2     Recent Labs   Lab 05/15/22  0618   WBC 13.37*   RBC 4.70   HGB 13.8   HCT 43.2      MCV 92   MCH 29.4   MCHC 31.9*     Recent Labs   Lab 05/15/22  0841      K 3.2*  3.2*      CO2 24   BUN 20   CREATININE 1.0   MG 1.6       Microbiology Data:   Microbiology Results (last 7 days)       Procedure Component Value Units Date/Time    Culture, Respiratory with Gram Stain [[id number]] Collected: 05/15/22 0804    Order Status: Sent Specimen: Respiratory from Sputum Updated: 05/15/22 0826    Blood culture [[id number]] Collected: 05/15/22 0620    Order Status: Sent Specimen: Blood Updated: 05/15/22 0624    Blood culture [[id number]] Collected: 05/15/22 0624    Order Status: Sent Specimen: Blood Updated: 05/15/22 0624    Urine culture [[id number]] Collected: 05/15/22 0245    Order Status: No result Specimen: Urine from Clean Catch Updated: 05/15/22 0537    Urine culture [[id number]]     Order Status: Completed Specimen: Urine,  Catheterized              Chest Imaging:   No new imaging.     Infusions:     sodium chloride 0.9% 75 mL/hr at 05/15/22 1200    sodium chloride 0.9% 5 mL/hr at 05/15/22 1200    niCARdipine 5 mg/hr (05/15/22 1200)    propofoL Stopped (05/15/22 0531)       Scheduled Medications:    acetaminophen  650 mg Per NG tube Q6H    enoxaparin  40 mg Subcutaneous Daily    famotidine (PF)  20 mg Intravenous Daily    mupirocin   Nasal BID    piperacillin-tazobactam (ZOSYN) IVPB  4.5 g Intravenous Q8H       PRN Medications:   acetaminophen, dextrose 10%, hydrALAZINE, magnesium sulfate IVPB, ondansetron, sodium chloride 0.9%, sodium chloride 0.9%    Assessment & Plan:   Patient Active Problem List   Diagnosis    Bipolar affective disorder    Dementia without behavioral disturbance    Essential hypertension    Debility    Chronic pain    Nursing home resident    Wheelchair dependent    Constipation due to opioid therapy    Chronic diastolic heart failure    Glaucoma    Syncope    Cardiopulmonary arrest    Hyperlipidemia    Anaclitic depression    Elevated liver enzymes    Leukocytosis    Lactic acidosis    Anoxic brain injury    A-fib       ASSESSMENT & RECOMMENDATIONS     Patient is a 81 year old female with PMHx of bipolar disorder, diastolic heart failure, HTN, anemia admitted for cardiopulmonary arrest after found down at psych facility for unknown amount of time. Patient CT head showing anoxic brain injury. Patient currently intubated pending EEG imaging. Prognosis guarded     Neuro/Psych  Patient is currently intubated  Sedated on propofol  CT head showing anoxic brain injury  Continue to monitor  EEG pending    CV  #cardiopulmonary arrest  #troponinemia  - found down for unknown amount of time  - s/p ACLS with ROSC achieved after 30 minutes  - UDS negative  - cardiology consulted, elevated troponin 2/2 to AED shocks  - TTM initiated in ED  - on nicardipine drip      #CHF  - echo in 2019 EF 55% GI DD   - CXR bilateral  interstitial and patchy airspace opacities  - repeat echo pending    #afib  - initial EKG with Afib currently in NSR with PVCs  - cardiology consulted      Pulm  #Respiratory Failure  Vent settings as above  ABG 7.4/35.8/25.4  CXR with bilateral interstitial and patchy airspace opacities  Failed SBT today  Continue to wean as tolerated  Continue Daily SAT/SBT        FEN/GI  Diet: NPO  Electrolytes are wnl  Stress ulcer prophylaxis: Protonix  Monitor for shock liver: LFTs elevated  Fluid restriction   Keep Mg 2, K 4  Zofran prn for n/v     RENAL  UOP: 1750cc , In: 30cc, net -1446cc in last 24 hrs  Strict I&Os  Avoid renal toxic meds  BUN/Cr: 19/0.9, baseline 1.0  Continue to monitor renal status and urine output     #Hyperkalemia  - found to be hyperkalemic  - given insulin and lokelma for shifting  - currently hypokalemic to 3.2    Heme  H/H 13.8/43.2; stable  WBC 13.37 elevated  DVT prophylaxis: Lovenox    Endo  No known issues at this time.  Maintain glucose 140-180  MDSSI      ID  On zosyn for aspiration pna  Cxs are NGTD.   Continue ABX.  Urinalysis: hematuria  Blood culture pending  urine culture pending  resp culture pending      DVT PPx: lovenox  Diet: NPO  GI PPX: IV Protonix  Deconditioning: PT/OT  Code Status: Full; patient has living will in media tab      Dispo  - EEG, GOC discussion      5/15/2022 Salvatore Lewis M.D., -II  [facility]   [facility]        Pulmonary/Critical Care Attending with Team    Patient seen and examined on rounds with team after review of PMH, reason for admission, hospital course, labs and xrays.  I have reviewed, we discussed, and I have verified Dr. Lewis's findings, assessment and recommendations.  The patient, with multiple medical problems, came in from a nursing home after having a cardiac arrest.  She was found to have anoxic brain injury after CT imaging.  She is now on the vent here, for which the outcome is likely to be poor.   Following.    Yael Horn MD  P/CCM  5/15/2022

## 2022-05-15 NOTE — SUBJECTIVE & OBJECTIVE
Past Medical History:   Diagnosis Date    Anemia     Bipolar disorder     Chronic diastolic heart failure     Chronic pain     Dementia     Depression     E coli bacteremia 9/21/2018    Extrapyramidal and movement disorder     Glaucoma     Hyperlipidemia     Hypertension     Pyelonephritis 9/21/2018    Right nephrolithiasis 9/21/2018    Scabies 05/05/2017       Past Surgical History:   Procedure Laterality Date    CYSTOSCOPY W/ URETERAL STENT PLACEMENT Right 9/27/2018    Procedure: CYSTOSCOPY, WITH URETERAL STENT INSERTION, possible retrograde pyelogram;  Surgeon: Laura Sorenson MD;  Location: [facility];  Service: Urology;  Laterality: Right;       Review of patient's allergies indicates:  No Known Allergies    No current facility-administered medications on file prior to encounter.     Current Outpatient Medications on File Prior to Encounter   Medication Sig    amitriptyline (ELAVIL) 25 MG tablet Take 25 mg by mouth nightly as needed for Insomnia.    amitriptyline (ELAVIL) 50 MG tablet Take 50 mg by mouth nightly.    amLODIPine (NORVASC) 10 MG tablet Take 1 tablet (10 mg total) by mouth once daily.    aspirin (ECOTRIN) 81 MG EC tablet Take 81 mg by mouth once daily.    atorvastatin (LIPITOR) 10 MG tablet Take 5 mg by mouth nightly.    calcium carbonate/vitamin D3 (CALCIUM 600 + D,3, ORAL) Take by mouth once daily.    carteolol (OCUPRESS) 1 % ophthalmic solution Place 1 drop into the right eye 2 (two) times daily.    cetirizine (ZYRTEC) 10 MG tablet Take 10 mg by mouth every evening.    cyanocobalamin (VITAMIN B-12) 100 MCG tablet Take 100 mcg by mouth once daily.    divalproex (DEPAKOTE) 250 MG EC tablet Take 250 mg by mouth 2 (two) times daily.     docusate sodium (COLACE) 100 MG capsule Take 100 mg by mouth daily as needed for Constipation.    estradiol (ESTRACE) 0.01 % (0.1 mg/gram) vaginal cream Place 1 g vaginally twice a week. Monday and Thursday    fluticasone (FLONASE) 50 mcg/actuation nasal spray 1 spray by  Each Nare route every evening.     hydrALAZINE (APRESOLINE) 25 MG tablet Take 1 tablet (25 mg total) by mouth every 12 (twelve) hours.    HYDROcodone-acetaminophen (NORCO) 5-325 mg per tablet Take 1 tablet by mouth 3 (three) times daily as needed for Pain.    latanoprost 0.005 % ophthalmic solution Place 1 drop into the right eye every evening.    melatonin 3 mg Tab Take by mouth every evening.    memantine (NAMENDA) 5 MG Tab Take 5 mg by mouth once daily.    methylcellulose oral powder Take by mouth once daily. 30cc In orange juice daily    multivitamin (ONE DAILY MULTIVITAMIN) per tablet Take 1 tablet by mouth once daily.    NUEDEXTA 20-10 mg per capsule Take 1 capsule by mouth 2 (two) times daily.    propranolol (INDERAL) 40 MG tablet Take 40 mg by mouth once daily.    risperiDONE (RISPERDAL) 0.5 MG Tab Take 0.5 mg by mouth 2 (two) times daily.     risperiDONE (RISPERDAL) 2 MG tablet Take 2 mg by mouth 2 (two) times daily.    tiZANidine (ZANAFLEX) 4 MG tablet Take 4 mg by mouth 3 (three) times daily.    traZODone (DESYREL) 100 MG tablet Take 100 mg by mouth nightly.    traZODone (DESYREL) 50 MG tablet Take 25 mg by mouth every evening.    [DISCONTINUED] famciclovir (FAMVIR) 500 MG tablet Take 500 mg by mouth 3 (three) times daily.     Family History    None       Tobacco Use    Smoking status: Unknown If Ever Smoked    Smokeless tobacco: Never Used   Substance and Sexual Activity    Alcohol use: Not on file    Drug use: Not on file    Sexual activity: Not on file     Review of Systems   Unable to perform ROS: Patient unresponsive   Objective:     Vital Signs (Most Recent):  Temp: (!) 93.92 °F (34.4 °C) (05/15/22 0600)  Pulse: 81 (05/15/22 0600)  Resp: (!) 26 (05/15/22 0600)  BP: (!) 219/105 (05/15/22 0635)  SpO2: 97 % (05/15/22 0600)   Vital Signs (24h Range):  Temp:  [93.92 °F (34.4 °C)] 93.92 °F (34.4 °C)  Pulse:  [46-83] 81  Resp:  [17-27] 26  SpO2:  [87 %-100 %] 97 %  BP: ()/() 219/105     Weight:  98 kg (216 lb 0.8 oz)  Body mass index is 34.87 kg/m².    Physical Exam  Vitals and nursing note reviewed.   Constitutional:       Appearance: She is obese. She is ill-appearing.   HENT:      Head: Normocephalic and atraumatic.      Nose: Nose normal.      Mouth/Throat:      Mouth: Mucous membranes are dry.   Eyes:      Comments: Pupils fixed   Cardiovascular:      Rate and Rhythm: Normal rate and regular rhythm.      Pulses: Normal pulses.      Heart sounds: Normal heart sounds.   Pulmonary:      Comments: vented  Abdominal:      General: Bowel sounds are normal.      Palpations: Abdomen is soft.   Musculoskeletal:      Cervical back: Neck supple.      Right lower leg: No edema.      Left lower leg: No edema.   Skin:     General: Skin is dry.      Coloration: Skin is pale.   Neurological:      Comments: Unresponsive, fixed pupils, no gag   Psychiatric:      Comments: Unable to assess           Significant Labs: All pertinent labs within the past 24 hours have been reviewed.    Significant Imaging: I have reviewed all pertinent imaging results/findings within the past 24 hours.

## 2022-05-15 NOTE — PLAN OF CARE
Upon arrival patient remains unresponsive, reflexes absent. Propofol gtt stopped. Started NS at 75 cc/hr. BP remains elevated, 10 mg IVP hydralazine given. LEON Trevino NP at bedside to evaluate patient. Per NP ok to place OG tube to LIWS. Orders placed for EKG. TTM goal 36 degrees C, started at 0445. Brother updated on POC by ED. Team aware of patient status. No new orders given at this time. Skin and safety precautions in place. Bed in lowest position, side rails up x2, call light within reach. Report given to Paola CLARKE.

## 2022-05-15 NOTE — ED NOTES
Patients brother/power of , Mr. Rosales Guthrie, has arrived to speak with provider. Dr. Fields has spoken to the patient and updated him on the patients status and admission to ICU. Mr. Guthrie request to be the primary contact for any updates on his sisters status. Cell phone number [phone]

## 2022-05-15 NOTE — HPI
Austyn Pedersen is a 82 yo female with a pmh of bipolar disorder, diastolic heart failure. Dementia, HTN, HLD, anemia. She presented to the ED via EMS in cardiopulmonary arrest. Per EMS report, she was at a psych facility and found on the ground from assumed unwitnessed fall out of bed. She was placed back into her bed at that time and was reported to be arousable and seemed at her baseline. She became unresponsive and pulseless shortly after so CPR was initiated with AED indication to shock, so patient was shocked 2-3 times. Patient was asystolic on EMS arrival with continued CPR and epinephrine administration. ROSC achieved after 30 minutes and the patient was intubated in the field. On arrival to the ED, she remained unresponsive off sedation with labile blood pressures ranging from low to extremely elevated. She was also bradycardic. She was initiated on propofol and hypothermia protocol in ED. CT head with anoxia noted. Lactic acid 7.8. Troponin normal.

## 2022-05-15 NOTE — H&P
[facility] Medicine  History & Physical    Patient Name: Austyn Pedersen  MRN: [id number]  Patient Class: IP- Inpatient  Admission Date: 5/15/2022  Attending Physician: Xavier Pace, *   Primary Care Provider: [facility] - drparikh (Inactive)         Patient information was obtained from EMS personnel, past medical records and ER records.     Subjective:     Principal Problem:Cardiopulmonary arrest    Chief Complaint: No chief complaint on file.       HPI: Austyn Pedersen is a 82 yo female with a pmh of bipolar disorder, diastolic heart failure. Dementia, HTN, HLD, anemia. She presented to the ED via EMS in cardiopulmonary arrest. Per EMS report, she was at a psych facility and found on the ground from assumed unwitnessed fall out of bed. She was placed back into her bed at that time and was reported to be arousable and seemed at her baseline. She became unresponsive and pulseless shortly after so CPR was initiated with AED indication to shock, so patient was shocked 2-3 times. Patient was asystolic on EMS arrival with continued CPR and epinephrine administration. ROSC achieved after 30 minutes and the patient was intubated in the field. On arrival to the ED, she remained unresponsive off sedation with labile blood pressures ranging from low to extremely elevated. She was also bradycardic. She was initiated on propofol and hypothermia protocol in ED. CT head with anoxia noted. Lactic acid 7.8. Troponin normal.        Past Medical History:   Diagnosis Date    Anemia     Bipolar disorder     Chronic diastolic heart failure     Chronic pain     Dementia     Depression     E coli bacteremia 9/21/2018    Extrapyramidal and movement disorder     Glaucoma     Hyperlipidemia     Hypertension     Pyelonephritis 9/21/2018    Right nephrolithiasis 9/21/2018    Scabies 05/05/2017       Past Surgical History:   Procedure Laterality Date    CYSTOSCOPY W/ URETERAL STENT PLACEMENT Right  9/27/2018    Procedure: CYSTOSCOPY, WITH URETERAL STENT INSERTION, possible retrograde pyelogram;  Surgeon: Laura Sorenson MD;  Location: [facility];  Service: Urology;  Laterality: Right;       Review of patient's allergies indicates:  No Known Allergies    No current facility-administered medications on file prior to encounter.     Current Outpatient Medications on File Prior to Encounter   Medication Sig    amitriptyline (ELAVIL) 25 MG tablet Take 25 mg by mouth nightly as needed for Insomnia.    amitriptyline (ELAVIL) 50 MG tablet Take 50 mg by mouth nightly.    amLODIPine (NORVASC) 10 MG tablet Take 1 tablet (10 mg total) by mouth once daily.    aspirin (ECOTRIN) 81 MG EC tablet Take 81 mg by mouth once daily.    atorvastatin (LIPITOR) 10 MG tablet Take 5 mg by mouth nightly.    calcium carbonate/vitamin D3 (CALCIUM 600 + D,3, ORAL) Take by mouth once daily.    carteolol (OCUPRESS) 1 % ophthalmic solution Place 1 drop into the right eye 2 (two) times daily.    cetirizine (ZYRTEC) 10 MG tablet Take 10 mg by mouth every evening.    cyanocobalamin (VITAMIN B-12) 100 MCG tablet Take 100 mcg by mouth once daily.    divalproex (DEPAKOTE) 250 MG EC tablet Take 250 mg by mouth 2 (two) times daily.     docusate sodium (COLACE) 100 MG capsule Take 100 mg by mouth daily as needed for Constipation.    estradiol (ESTRACE) 0.01 % (0.1 mg/gram) vaginal cream Place 1 g vaginally twice a week. Monday and Thursday    fluticasone (FLONASE) 50 mcg/actuation nasal spray 1 spray by Each Nare route every evening.     hydrALAZINE (APRESOLINE) 25 MG tablet Take 1 tablet (25 mg total) by mouth every 12 (twelve) hours.    HYDROcodone-acetaminophen (NORCO) 5-325 mg per tablet Take 1 tablet by mouth 3 (three) times daily as needed for Pain.    latanoprost 0.005 % ophthalmic solution Place 1 drop into the right eye every evening.    melatonin 3 mg Tab Take by mouth every evening.    memantine (NAMENDA) 5 MG Tab Take 5 mg by  mouth once daily.    methylcellulose oral powder Take by mouth once daily. 30cc In orange juice daily    multivitamin (ONE DAILY MULTIVITAMIN) per tablet Take 1 tablet by mouth once daily.    NUEDEXTA 20-10 mg per capsule Take 1 capsule by mouth 2 (two) times daily.    propranolol (INDERAL) 40 MG tablet Take 40 mg by mouth once daily.    risperiDONE (RISPERDAL) 0.5 MG Tab Take 0.5 mg by mouth 2 (two) times daily.     risperiDONE (RISPERDAL) 2 MG tablet Take 2 mg by mouth 2 (two) times daily.    tiZANidine (ZANAFLEX) 4 MG tablet Take 4 mg by mouth 3 (three) times daily.    traZODone (DESYREL) 100 MG tablet Take 100 mg by mouth nightly.    traZODone (DESYREL) 50 MG tablet Take 25 mg by mouth every evening.    [DISCONTINUED] famciclovir (FAMVIR) 500 MG tablet Take 500 mg by mouth 3 (three) times daily.     Family History    None       Tobacco Use    Smoking status: Unknown If Ever Smoked    Smokeless tobacco: Never Used   Substance and Sexual Activity    Alcohol use: Not on file    Drug use: Not on file    Sexual activity: Not on file     Review of Systems   Unable to perform ROS: Patient unresponsive   Objective:     Vital Signs (Most Recent):  Temp: (!) 93.92 °F (34.4 °C) (05/15/22 0600)  Pulse: 81 (05/15/22 0600)  Resp: (!) 26 (05/15/22 0600)  BP: (!) 219/105 (05/15/22 0635)  SpO2: 97 % (05/15/22 0600)   Vital Signs (24h Range):  Temp:  [93.92 °F (34.4 °C)] 93.92 °F (34.4 °C)  Pulse:  [46-83] 81  Resp:  [17-27] 26  SpO2:  [87 %-100 %] 97 %  BP: ()/() 219/105     Weight: 98 kg (216 lb 0.8 oz)  Body mass index is 34.87 kg/m².    Physical Exam  Vitals and nursing note reviewed.   Constitutional:       Appearance: She is obese. She is ill-appearing.   HENT:      Head: Normocephalic and atraumatic.      Nose: Nose normal.      Mouth/Throat:      Mouth: Mucous membranes are dry.   Eyes:      Comments: Pupils fixed   Cardiovascular:      Rate and Rhythm: Normal rate and regular rhythm.       Pulses: Normal pulses.      Heart sounds: Normal heart sounds.   Pulmonary:      Comments: vented  Abdominal:      General: Bowel sounds are normal.      Palpations: Abdomen is soft.   Musculoskeletal:      Cervical back: Neck supple.      Right lower leg: No edema.      Left lower leg: No edema.   Skin:     General: Skin is dry.      Coloration: Skin is pale.   Neurological:      Comments: Unresponsive, fixed pupils, no gag   Psychiatric:      Comments: Unable to assess           Significant Labs: All pertinent labs within the past 24 hours have been reviewed.    Significant Imaging: I have reviewed all pertinent imaging results/findings within the past 24 hours.    Assessment/Plan:     * Cardiopulmonary arrest  Lactic Acidosis  Elevated Liver Enzymes  Leukocytosis  Anoxic Brain Injury  Hypertension      Unclear cause at this time  Normal troponin following extensive CPR    Drug screen clean  Remains unresponsive off sedation    -therapeutic hypothermia in progress  -propofol as needed  -consult pulmonology  -consult cardiology  -echo pending  -repeat lactic pending  -monitor liver function, shock liver developing  -monitor for seizure activity  -zosyn for empiric coverage  -PRN hydralazine, avoid hypotension  -brotherRob, is the contact and will need to discuss goals of care/code status      A-fib  S/p arrest  Initial EKG with Afib, now appears to be back in NSR  -repeat EKG pending  -consult cardiology      Anoxic brain injury  S/p arrest  Poor prognosis given extensive down time  Unresponsive off sedation, fixed pupils    -monitor for seizure activity  -therapeutic hypothermia in progress  -avoid hypotension          Lactic acidosis  S/p arrest  Repeat pending       Leukocytosis  Stress induced s/p arrest  -monitor       Elevated liver enzymes  See above        VTE Risk Mitigation (From admission, onward)         Ordered     enoxaparin injection 40 mg  Daily         05/15/22 0426     IP VTE HIGH RISK  PATIENT  Once         05/15/22 0426     Place sequential compression device  Until discontinued         05/15/22 0426     Place sequential compression device  Until discontinued         05/15/22 0421     Place sequential compression device  Until discontinued         05/15/22 0421              Critical care time spent on the evaluation and treatment of severe organ dysfunction, review of pertinent labs and imaging studies, discussions with consulting providers and discussions with patient/family: 90 minutes.     Radha Trevino NP  Department of [facility] Medicine   [place] - Intensive Care

## 2022-05-15 NOTE — ASSESSMENT & PLAN NOTE
Lactic Acidosis  Elevated Liver Enzymes  Leukocytosis  Anoxic Brain Injury  Hypertension      Unclear cause at this time  Normal troponin following extensive CPR    Drug screen clean  Remains unresponsive off sedation    -therapeutic hypothermia in progress  -propofol as needed  -consult pulmonology  -consult cardiology  -echo pending  -repeat lactic pending  -monitor liver function, shock liver developing  -monitor for seizure activity  -zosyn for empiric coverage  -PRN hydralazine, avoid hypotension  -brotherRob, is the contact and will need to discuss goals of care/code status

## 2022-05-15 NOTE — Clinical Note
Diagnosis: Cardiopulmonary arrest [[id number]]   Admitting Provider:: GORGE DANG [[id number]]   Future Attending Provider: GORGE DANG [[id number]]   Reason for IP Medical Treatment  (Clinical interventions that can only be accomplished in the IP setting? ) :: Cardiopulmonary Arrest   Estimated Length of Stay:: 2 midnights   I certify that Inpatient services for greater than or equal to 2 midnights are medically necessary:: Yes   Plans for Post-Acute care--if anticipated (pick the single best option):: A. No post acute care anticipated at this time   Special Needs:: Intubated [19]

## 2022-05-15 NOTE — CARE UPDATE
Called to ed for respiratory distress.  Pt came in by ems intubated with 7.0 28 at the teeth being bagged.  Assumed care of pt, placed pt on vent with these settings without incident.     05/15/22 0151   Patient Assessment/Suction   Level of Consciousness (AVPU) unresponsive   PRE-TX-O2   O2 Device (Oxygen Therapy) ventilator   Oxygen Concentration (%) 100   SpO2 (!) 87 %   Pulse (!) 46   Resp (!) 24   Skin Integrity   $ Wound Care Tech Time 15 min   Area Observed Upper lip;Cheek   Skin Appearance without discoloration        Airway - Non-Surgical 05/15/22 Endotracheal Tube   Placement Date: 05/15/22   Present Prior to Hospital Arrival?: Yes  Inserted by: EMS  Airway Device: Endotracheal Tube  Airway Device Size: 7.0  Style: Cuffed  Placement Verified By: Auscultation;Capnometry  Depth of Insertion (cm): 26  Secured at: Teeth   Secured at 26 cm   Measured At Teeth   Secured Location Left   Secured by Commercial tube escalera   Bite Block secure and patent   Site Condition Cool;Dry   Status Intact;Secured   Site Assessment Clean;Dry   General Safety Checklist   Safety Promotion/Fall Prevention side rails raised   Airway Safety   Ambu bag with the patient? Yes, Adult Ambu   Is mask with the patient? Yes, Adult Mask   Vent Select   Conventional Vent Y   Ventilator Initiated Yes   $ Ventilator Initial 1   Charged w/in last 24h YES   Preset Conventional Ventilator Settings   Vent Type Trilogy    Ventilation Type VC   Vent Mode A/C   Humidity HME   Set Rate 22 BPM   Vt Set 450 mL   PEEP/CPAP 5 cmH20   Waveform RAMP   Patient Ventilator Parameters   Resp Rate Total 22 br/min   Exhaled Vt 444 mL   Total Ve 9.8 mL   I:E Ratio Measured 1:1.7   Conventional Ventilator Alarms   Alarms On Y   Ready to Wean/Extubation Screen   FIO2<=50 (chart decimal) (!) 1   MV<16L (chart vol.) 9.8   PEEP <=8 (chart #) 5   Ready to Wean Parameters   F/VT Ratio<105 (RSBI) (!) 54.05

## 2022-05-15 NOTE — EICU
EICU BRIEF ADMIT NOTE:    HISTORY: Please refer to H/P and ER notes for detail. intubated    CAMERA ASSESSMENT: Two way audiovisual assessment was done: noticed    Telemetry was reviewed. Medical records including notes, labs and imaging were reviewed.hi    DISCUSSED with bedside nurse.    ASSESSMENT AND PLAN:    #.  Status post cardiac arrest.  Prolonged resuscitation (> 30 min) before return of spontaneous circulation.  Out of hospital arrest.  EMS noted asystole on arrival. ? Etiology.  The patient was intubated during cardiac arrest.  Clinically, the patient does not have any response.  No cough or gag.  CT head with evidence of significant anoxic damage  --Maintain therapeutic hypothermia protocol  --supportive care  --Neurology consult  --Echocardiogram.      # Acute respiratory failure.  Intubated during cardiac arrest.  Ventilator settings reviewed.  ABG reviewed. Continue to wean FiO2 as tolerated.      # Suspected aspiration pneumonia during cardiac arrest.  Will empirically start antibiotics.    # Acute renal insufficiency, likely ATN with hyperkalemia.  Continue IV fluids.  Treatment for hyperkalemia ordered. Follow-up labs ordered.      # Lactic acidosis due to cardiac arrest.  Continue to trend.    # Abnormal LFTs.  Likely shock liver.  Continue to follow up with.    # History of diabetes.  Monitor blood glucose levels.  If remains hyperglycemic, may need to start insulin infusion.    # Anemia, not sure if acute or chronic.  Continue to follow.  No obvious signs of active bleed.    # Hypertension.  When necessary hydralazine order    BEST PRACTICES REVIEW:    STRESS ULCER PROPHYLAXIS: Pepcid  DVT PROPHYLAXIS:   Lovenox    [facility] You [facility] the [facility]. Please call as needed      Dajuan Goldstein MD  [facility]  [phone]

## 2022-05-15 NOTE — ASSESSMENT & PLAN NOTE
S/p arrest  Poor prognosis given extensive down time  Unresponsive off sedation, fixed pupils    -monitor for seizure activity  -therapeutic hypothermia in progress  -avoid hypotension

## 2022-05-15 NOTE — ED PROVIDER NOTES
Encounter Date: 5/15/2022       History   No chief complaint on file.    81-year-old female brought to the emergency department by EMS from [facility] facility after cardiopulmonary arrest.  Per EMS, nursing staff informed them that patient was found on the ground next to her bed with presumptive fall of unknown timing.  They state patient was arousable and they got her back into bed, seemed to be at her neurologic and behavioral baseline.  Shortly thereafter she became unresponsive and they were unable to palpate a pulse.  911 was called and CPR was initiated.  Nurses indicated to EMS that prior to EMS arrival, 80 ED indicated shock and patient was shocked at least 2 if not 3 times total.  EMS reports on arrival patient was in asystole.  She received CPR on scene with ACLS, including epinephrine administration for approximately 30 minutes total when she had return of spontaneous circulation.  Patient was intubated on scene.  Subsequently brought to the emergency department.  No other history available secondary to patient is intubated.        Review of patient's allergies indicates:  No Known Allergies  Past Medical History:   Diagnosis Date    Anemia     Bipolar disorder     Chronic diastolic heart failure     Chronic pain     Dementia     Depression     E coli bacteremia 9/21/2018    Extrapyramidal and movement disorder     Glaucoma     Hyperlipidemia     Hypertension     Pyelonephritis 9/21/2018    Right nephrolithiasis 9/21/2018    Scabies 05/05/2017     Past Surgical History:   Procedure Laterality Date    CYSTOSCOPY W/ URETERAL STENT PLACEMENT Right 9/27/2018    Procedure: CYSTOSCOPY, WITH URETERAL STENT INSERTION, possible retrograde pyelogram;  Surgeon: Laura Sorenson MD;  Location: [facility];  Service: Urology;  Laterality: Right;     History reviewed. No pertinent family history.  Social History     Tobacco Use    Smoking status: Unknown If Ever Smoked    Smokeless tobacco: Never  Used     Review of Systems   Unable to perform ROS: Intubated       Physical Exam     Initial Vitals   BP Pulse Resp Temp SpO2   05/15/22 0201 05/15/22 0151 05/15/22 0151 -- 05/15/22 0151   (!) 77/52 (!) 46 (!) 24  (!) 87 %      MAP       --                Physical Exam    Nursing note and vitals reviewed.  Constitutional: She appears well-developed and well-nourished. Airway: Endotracheal Tube present. Level of Consciousness: Unresponsive.   HENT:   Head: Normocephalic and atraumatic. No head trauma present.   Eyes: Pupils: Fixed and Dilated. Conjunctivae are normal.   Neck: Neck supple.   Normal range of motion.  Cardiovascular: Intact distal pulses. Heart Sounds: Irregularly irregular.   Bradycardic, irregular rhythm   Pulmonary/Chest: Ventilations: Ventilator.   Abdominal: Abdomen is soft. She exhibits no distension.   Musculoskeletal:         General: No edema.      Cervical back: Normal range of motion and neck supple.     Neurological: Unresponsive to stimuli.   Skin: Skin is warm and dry. Capillary refill takes less than 2 seconds.         ED Course   Critical Care    Date/Time: 5/15/2022 3:54 AM  Performed by: Barney Fields MD  Authorized by: Barney Fields MD   Direct patient critical care time: 30 minutes  Additional history critical care time: 30 minutes  Ordering / reviewing critical care time: 30 minutes  Documentation critical care time: 20 minutes  Consulting other physicians critical care time: 20 minutes  Consult with family critical care time: 25 minutes  Other critical care time: 20 minutes  Total critical care time (exclusive of procedural time) : 175 minutes  Critical care time was exclusive of separately billable procedures and treating other patients.  Critical care was necessary to treat or prevent imminent or life-threatening deterioration of the following conditions: cardiac failure and CNS failure or compromise.  Critical care was time spent personally by me on the following  activities: evaluation of patient's response to treatment, ordering and review of laboratory studies, obtaining history from patient or surrogate, pulse oximetry, review of old charts, re-evaluation of patient's condition, ordering and review of radiographic studies, ordering and performing treatments and interventions, examination of patient, interpretation of cardiac output measurements and development of treatment plan with patient or surrogate.        Labs Reviewed   CBC W/ AUTO DIFFERENTIAL - Abnormal; Notable for the following components:       Result Value    WBC 19.83 (*)     RBC 3.52 (*)     Hemoglobin 10.5 (*)     Hematocrit 35.0 (*)     MCV 99 (*)     MCHC 30.0 (*)     RDW 15.4 (*)     nRBC 1 (*)     All other components within normal limits   COMPREHENSIVE METABOLIC PANEL - Abnormal; Notable for the following components:    Potassium 5.6 (*)     CO2 21 (*)     Glucose 280 (*)     Calcium 8.4 (*)     Total Protein 5.9 (*)     Albumin 2.8 (*)     Alkaline Phosphatase 155 (*)      (*)      (*)     Anion Gap 18 (*)     eGFR if  45 (*)     eGFR if non  39 (*)     All other components within normal limits   URINALYSIS - Abnormal; Notable for the following components:    Appearance, UA Hazy (*)     Protein, UA 2+ (*)     All other components within normal limits    Narrative:     Specimen Source->Urine   LACTIC ACID, PLASMA - Abnormal; Notable for the following components:    Lactate (Lactic Acid) 7.8 (*)     All other components within normal limits   TSH - Abnormal; Notable for the following components:    TSH 4.857 (*)     All other components within normal limits   URINALYSIS MICROSCOPIC - Abnormal; Notable for the following components:    RBC, UA 10 (*)     WBC, UA 10 (*)     All other components within normal limits    Narrative:     Specimen Source->Urine   POCT GLUCOSE - Abnormal; Notable for the following components:    POCT Glucose 211 (*)     All other  components within normal limits   ISTAT PROCEDURE - Abnormal; Notable for the following components:    POC PH 7.223 (*)     POC PCO2 53.2 (*)     POC HCO3 21.9 (*)     POC Hematocrit 32 (*)     All other components within normal limits   CULTURE, URINE   CULTURE, RESPIRATORY   CULTURE, BLOOD   CULTURE, BLOOD   DRUG SCREEN PANEL, URINE EMERGENCY    Narrative:     Specimen Source->Urine   ALCOHOL,MEDICAL (ETHANOL)   TROPONIN I   MAGNESIUM   T4, FREE   TROPONIN I   CK   CBC W/ AUTO DIFFERENTIAL   PROTIME-INR   APTT   NEURON SPECIFIC ENOLASE, SERUM   GLUCOSE, RANDOM   GLUCOSE, RANDOM   GLUCOSE, RANDOM   SARS-COV-2 RDRP GENE   TYPE & SCREEN   POCT GLUCOSE MONITORING CONTINUOUS     EKG Readings: (Independently Interpreted)   Initial Reading: No STEMI. Previous EKG: Compared with most recent EKG Previous EKG Date: 5/21/2021 (Rhythm, morphology change) . Rhythm: Junctional Rhythm. Heart Rate: 46. Ectopy: No Ectopy. Conduction: Normal. ST Segments: Normal ST Segments. Axis: Normal.         X-Rays:   Independently Interpreted Readings:   Other Readings:  Imaging interpreted by radiologist and visualized by me:     Imaging Results          X-Ray Chest AP Portable (Final result)  Result time 05/15/22 03:26:50    Final result by Barrera Pierce MD (05/15/22 03:26:50)                 Impression:      As above.      Electronically signed by: Barrera Pierce MD  Date:    05/15/2022  Time:    03:26             Narrative:    EXAMINATION:  XR CHEST AP PORTABLE    CLINICAL HISTORY:  Personal history of other medical treatment    TECHNIQUE:  Single frontal view of the chest was performed.    COMPARISON:  05/15/2022 223 hours    FINDINGS:  Endotracheal tube has been retracted with tip now terminating approximately 3.0 cm above the idalmis.  Esophagogastric tube courses below the diaphragm stable size and configuration of the cardiomediastinal silhouette.  No significant change in cardiopulmonary status compared to prior examination.   No evidence of pneumothorax                                CT Head Without Contrast (Final result)  Result time 05/15/22 03:36:45    Final result by Barrera Pierce MD (05/15/22 03:36:45)                 Impression:      1.  Questionable decreased gray-white matter differentiation as well as somewhat poor delineation of the deep gray matter structures.  While this may be artifactual, early anoxic ischemic injury would be difficult to exclude given reported history of cardiac arrest.  Consider short-term repeat follow-up CT or more sensitive assessment with MRI if there are no patient contraindications.    2.  No evidence of acute intracranial hemorrhage or midline shift.    3.  Chronic senescent and microvascular ischemic changes.    This report was flagged in Epic as abnormal.      Electronically signed by: Barrera Pierce MD  Date:    05/15/2022  Time:    03:36             Narrative:    EXAMINATION:  CT HEAD WITHOUT CONTRAST    CLINICAL HISTORY:  Head trauma, minor (Age >= 65y);    TECHNIQUE:  Low dose axial images were obtained through the head.  Coronal and sagittal reformations were also performed. Contrast was not administered.    COMPARISON:  CT head 05/21/2021, 02/05/2020    FINDINGS:  There is generalized cerebral volume loss.  Gray-white matter differentiation appears somewhat indistinct with poor delineation of the deep gray matter structures.  There is patchy periventricular white matter hypoattenuation suggesting sequelae of chronic microvascular ischemic change.  No definite evidence of acute intracranial hemorrhage.  No midline shift identified.  The basal cisterns appear patent.  Sulcation over the vertices appears maintained.  There are postsurgical changes of the right globe.  There is minimal mucosal thickening involving the anterior ethmoid air cells and sphenoid sinuses.  There are partially visualized endotracheal and esophagogastric tubes present.  Calvarium is intact.                                 X-Ray Chest AP Portable (Final result)  Result time 05/15/22 03:16:43    Final result by Barrera Pierce MD (05/15/22 03:16:43)                 Impression:      1.  Low lying endotracheal tube.  Esophagogastric tube within the proximal stomach.    2.  Mild prominence of the superior mediastinum compared to prior radiograph examinations.  If there is concern for aortic dissection, CTA chest could be performed.    3.  Diffuse bilateral interstitial and patchy airspace opacities which may relate to edema or multifocal infection.  Clinical correlation is advised.    This report was flagged in Epic as abnormal.      Electronically signed by: Barrera Pierce MD  Date:    05/15/2022  Time:    03:16             Narrative:    EXAMINATION:  XR CHEST AP PORTABLE    CLINICAL HISTORY:  Cardiopulmonary arrest;    TECHNIQUE:  Single frontal view of the chest was performed.    COMPARISON:  05/21/2021    FINDINGS:  Endotracheal tube tip is low lying terminating at the idalmis.  There is an esophagogastric tube present which courses below the diaphragm with tip and distal side port projecting over the stomach.  Cardiac monitoring leads and pacing pads overlie and partially obscure the chest.    There is mild prominence of the superior mediastinum in comparison to prior examination.  Cardiac silhouette is mildly prominent.  Lungs demonstrate diffuse bilateral interstitial and patchy airspace opacities.  No significant volume of pleural fluid appreciated.  No definite pneumothorax identified.  Osseous structures demonstrate degenerative changes.                                Medications   propofol (DIPRIVAN) 10 mg/mL infusion (10 mcg/kg/min × 79.4 kg Intravenous New Bag 5/15/22 4198)   busPIRone tablet 30 mg (has no administration in time range)   acetaminophen oral solution 650 mg (has no administration in time range)   magnesium sulfate 2g in water 50mL IVPB (premix) (has no administration in time range)   sodium chloride  0.9% flush 10 mL (has no administration in time range)   mupirocin 2 % ointment (has no administration in time range)   sodium chloride 0.9% bolus 1,000 mL (1,000 mLs Intravenous Bolus from Bag 5/15/22 7029)   calcium gluconat 1 g in NS IVPB (premixed) (0 g Intravenous Stopped 5/15/22 4793)     Medical Decision Making:   Initial Assessment:   81-year-old female presents to the emergency department for evaluation of cardiopulmonary arrest  Differential Diagnosis:   ACS, dissection, pneumonia, electrolyte dyscrasias, arrhythmia, ICH, SAH, CVA,  Independently Interpreted Test(s):   I have ordered and independently interpreted X-rays - see prior notes.  I have ordered and independently interpreted EKG Reading(s) - see prior notes  Clinical Tests:   Lab Tests: Reviewed       <> Summary of Lab: Mildly acidotic with mild hyperkalemia, elevated lactic acid,  ED Management:  Discussed the patient's case with the [facility] hospitalist who will see and admit the patient.  Given that she was shocked, I have ordered therapeutic hypothermia protocol.    Patient has been given IV fluid as well as calcium and started on propofol.  Prior to propofol at [facility], patient had no volitional activity, nor were her pupils responsive.    Of note, patient was intubated on arrival.                      Clinical Impression:   Final diagnoses:  [I46.9] Cardiopulmonary arrest  [Z92.89] History of ETT          ED Disposition Condition    Admit               Barney Fields MD  05/15/22 5410

## 2022-05-15 NOTE — NURSING
Pt OGT to [facility]. Pt has about 700 ml of dark reddish brown bodily fluids coming up from OGT. Dr. Hdz head notified. Orders placed. Will continue to monitor.

## 2022-05-15 NOTE — CONSULTS
[facility] - Intensive Care  Cardiology  Consult Note    Patient Name: Austyn Pedersen  MRN: [id number]  Admission Date: 5/15/2022  Hospital Length of Stay: 0 days  Code Status: Full Code   Attending Provider: Xavier Pace, *   Consulting Provider: Melissa Villalta MD  Primary Care Physician: [facility] - drparikh (Inactive)  Principal Problem:Cardiopulmonary arrest    Patient information was obtained from past medical records and ER records.     Inpatient consult to Cardiology-[facility]  Consult performed by: Melissa Villalta MD  Consult ordered by: Radha Trevino NP        Subjective:     Chief Complaint:  Cardiac arrest     HPI:  80 yo female h/o HFpEF, HTN, HLD  Bipolar disorder  Dementia    Brought in to ED from a psych facility for cardiac arrest.    Was found on the ground in psych facility (unclear how long down for). Was arousable and noted at baseline. Shortly after reported to become unresponsive and no pulse. CPR was started. 2-3 shocks with AED. EMS on arrival noted asystole. ROSC after 30 minutes. TTM was started. Noted acidotic with increased lactic acid.    Currently remains intubated and sedated. TTM as noted above.    Past Medical History:   Diagnosis Date    Anemia     Bipolar disorder     Chronic diastolic heart failure     Chronic pain     Dementia     Depression     E coli bacteremia 9/21/2018    Extrapyramidal and movement disorder     Glaucoma     Hyperlipidemia     Hypertension     Pyelonephritis 9/21/2018    Right nephrolithiasis 9/21/2018    Scabies 05/05/2017       Past Surgical History:   Procedure Laterality Date    CYSTOSCOPY W/ URETERAL STENT PLACEMENT Right 9/27/2018    Procedure: CYSTOSCOPY, WITH URETERAL STENT INSERTION, possible retrograde pyelogram;  Surgeon: Laura Sorenson MD;  Location: [facility] OR;  Service: Urology;  Laterality: Right;       Review of patient's allergies indicates:  No Known Allergies    No current facility-administered medications on  file prior to encounter.     Current Outpatient Medications on File Prior to Encounter   Medication Sig    amitriptyline (ELAVIL) 25 MG tablet Take 25 mg by mouth nightly as needed for Insomnia.    amitriptyline (ELAVIL) 50 MG tablet Take 50 mg by mouth nightly.    amLODIPine (NORVASC) 10 MG tablet Take 1 tablet (10 mg total) by mouth once daily.    aspirin (ECOTRIN) 81 MG EC tablet Take 81 mg by mouth once daily.    atorvastatin (LIPITOR) 10 MG tablet Take 5 mg by mouth nightly.    calcium carbonate/vitamin D3 (CALCIUM 600 + D,3, ORAL) Take by mouth once daily.    carteolol (OCUPRESS) 1 % ophthalmic solution Place 1 drop into the right eye 2 (two) times daily.    cetirizine (ZYRTEC) 10 MG tablet Take 10 mg by mouth every evening.    cyanocobalamin (VITAMIN B-12) 100 MCG tablet Take 100 mcg by mouth once daily.    divalproex (DEPAKOTE) 250 MG EC tablet Take 250 mg by mouth 2 (two) times daily.     docusate sodium (COLACE) 100 MG capsule Take 100 mg by mouth daily as needed for Constipation.    estradiol (ESTRACE) 0.01 % (0.1 mg/gram) vaginal cream Place 1 g vaginally twice a week. Monday and Thursday    fluticasone (FLONASE) 50 mcg/actuation nasal spray 1 spray by Each Nare route every evening.     hydrALAZINE (APRESOLINE) 25 MG tablet Take 1 tablet (25 mg total) by mouth every 12 (twelve) hours.    HYDROcodone-acetaminophen (NORCO) 5-325 mg per tablet Take 1 tablet by mouth 3 (three) times daily as needed for Pain.    latanoprost 0.005 % ophthalmic solution Place 1 drop into the right eye every evening.    melatonin 3 mg Tab Take by mouth every evening.    memantine (NAMENDA) 5 MG Tab Take 5 mg by mouth once daily.    methylcellulose oral powder Take by mouth once daily. 30cc In orange juice daily    multivitamin (ONE DAILY MULTIVITAMIN) per tablet Take 1 tablet by mouth once daily.    NUEDEXTA 20-10 mg per capsule Take 1 capsule by mouth 2 (two) times daily.    propranolol (INDERAL) 40 MG  tablet Take 40 mg by mouth once daily.    risperiDONE (RISPERDAL) 0.5 MG Tab Take 0.5 mg by mouth 2 (two) times daily.     risperiDONE (RISPERDAL) 2 MG tablet Take 2 mg by mouth 2 (two) times daily.    tiZANidine (ZANAFLEX) 4 MG tablet Take 4 mg by mouth 3 (three) times daily.    traZODone (DESYREL) 100 MG tablet Take 100 mg by mouth nightly.    traZODone (DESYREL) 50 MG tablet Take 25 mg by mouth every evening.    [DISCONTINUED] famciclovir (FAMVIR) 500 MG tablet Take 500 mg by mouth 3 (three) times daily.     Family History    None       Tobacco Use    Smoking status: Unknown If Ever Smoked    Smokeless tobacco: Never Used   Substance and Sexual Activity    Alcohol use: Not on file    Drug use: Not on file    Sexual activity: Not on file     Review of Systems   Unable to perform ROS: intubated     Objective:     Vital Signs (Most Recent):  Temp: 97.34 °F (36.3 °C) (05/15/22 1000)  Pulse: 81 (05/15/22 1045)  Resp: (!) 26 (05/15/22 1045)  BP: (!) 182/74 (05/15/22 1045)  SpO2: 96 % (05/15/22 1045) Vital Signs (24h Range):  Temp:  [93.92 °F (34.4 °C)-97.34 °F (36.3 °C)] 97.34 °F (36.3 °C)  Pulse:  [46-87] 81  Resp:  [17-37] 26  SpO2:  [87 %-100 %] 96 %  BP: ()/() 182/74     Weight: 98 kg (216 lb 0.8 oz)  Body mass index is 34.87 kg/m².    SpO2: 96 %  O2 Device (Oxygen Therapy): ventilator      Intake/Output Summary (Last 24 hours) at 5/15/2022 1121  Last data filed at 5/15/2022 0900  Gross per 24 hour   Intake 536.13 ml   Output 2205 ml   Net -1668.87 ml       Lines/Drains/Airways     Drain  Duration                Urethral Catheter 05/15/22 0252 16 Fr. <1 day          Airway  Duration                Airway - Non-Surgical Endotracheal Tube -- days         Airway - Non-Surgical 05/15/22 Endotracheal Tube <1 day          Peripheral Intravenous Line  Duration                Peripheral IV - Single Lumen 18 G Right Antecubital -- days         Peripheral IV - Single Lumen 05/15/22 0211 18 G Left  <1  day                Physical Exam  Constitutional:       Appearance: She is well-developed. She is not diaphoretic.      Interventions: She is intubated.   Neck:      Vascular: No JVD.   Cardiovascular:      Rate and Rhythm: Normal rate. Rhythm irregular.      Heart sounds: No murmur heard.    No friction rub. No gallop.   Pulmonary:      Effort: She is intubated.      Breath sounds: Normal breath sounds.   Abdominal:      Palpations: Abdomen is soft.   Musculoskeletal:         General: No swelling.   Skin:     General: Skin is warm.         Significant Labs:   CMP   Recent Labs   Lab 05/15/22  0205 05/15/22  0618 05/15/22  0619 05/15/22  0841    144  --  144   K 5.6* 3.9  --  3.2*  3.2*    101  --  102   CO2 21* 25  --  24   * 168*  168* 167* 160*  160*   BUN 21 21  --  20   CREATININE 1.3 1.1  --  1.0   CALCIUM 8.4* 9.8  --  9.4   PROT 5.9* 7.9  --   --    ALBUMIN 2.8* 4.1  --   --    BILITOT 0.5 1.0  --   --    ALKPHOS 155* 213*  --   --    * 265*  --   --    * 214*  --   --    ANIONGAP 18* 18*  --  18*   ESTGFRAFRICA 45* 55*  --  >60   EGFRNONAA 39* 48*  --  54*   , CBC   Recent Labs   Lab 05/15/22  0205 05/15/22  0214 05/15/22  0618   WBC 19.83*  --  13.37*   HGB 10.5*  --  13.8   HCT 35.0*   < > 43.2     --  255    < > = values in this interval not displayed.   , INR   Recent Labs   Lab 05/15/22  0618   INR 1.1    and Troponin   Recent Labs   Lab 05/15/22  0205 05/15/22  0619   TROPONINI 0.020 0.048*     EKG personally reviewed. My interpretation:  5/15/22: SR 80s, normal axis. PVCs. Septal infarct. Diffuse subtle STD. QTc 427    Other EKGs during this admission: junctional rhythm with PACs / PVCs    Assessment and Plan:     Cardiac arrest  - CAHP score 186-200. Translates to moderate risk for poor outcome, and 81% poor neurological outcome.  - No STEMI on EKG  - Troponin likely will continue to rise in the setting of AED shocks  - QTc prolonged on initial 2 EKGs  obtained  - Watch for neurological recovery  - Echocardiogram  - Prognosis guarded    Brother Rob called [phone] and updated. He verbalized understanding of the prognosis and was appreciative of the call.      Active Diagnoses:    Diagnosis Date Noted POA    PRINCIPAL PROBLEM:  Cardiopulmonary arrest [I46.9] 05/15/2022 Yes    Elevated liver enzymes [R74.8] 05/15/2022 Yes    Leukocytosis [D72.829] 05/15/2022 Yes    Lactic acidosis [E87.2] 05/15/2022 Yes    Anoxic brain injury [G93.1] 05/15/2022 Yes    A-fib [I48.91] 05/15/2022 Yes      Problems Resolved During this Admission:       VTE Risk Mitigation (From admission, onward)         Ordered     enoxaparin injection 40 mg  Daily         05/15/22 0426     IP VTE HIGH RISK PATIENT  Once         05/15/22 0426     Place sequential compression device  Until discontinued         05/15/22 0426     Place sequential compression device  Until discontinued         05/15/22 0421     Place sequential compression device  Until discontinued         05/15/22 0421              35 minutes of critical care time was utilized in the care of the patient, including evaluating the patient, reviewing the chart, medical decision making and updating brother Rob (Rosales) on the phone.    Thank you for your consult. I will follow-up with patient. Please contact us if you have any additional questions.    Melissa Villalta MD  Cardiology   [facility] - Intensive Care

## 2022-05-15 NOTE — PROCEDURES
Date of service  5/15/2022    Introduction  Electroencephalographic (EEG) recording is recorded with electrodes placed according to the International 10-20 placement system.  Thirty (30) channels of digital signal (sampling rate of 512/sec) were simultaneously recorded from the scalp and may include EKG, EMG, and/or eye monitors.  Recording band pass was 0.1 to 512 Hz.  Digital video recording of the patient is simultaneously recorded with the EEG.  The patient is instructed to report clinical symptoms which may occur during the recording session.  EEG and video recording are stored and archived in digital format.  Activation procedures, which include photic stimulation, hyperventilation and instructing patients to perform simple tasks, are done in selected patients.    The EEG is displayed on a monitor screen and can be reviewed using different montages.  Computer assisted-analysis is employed to detect spike and electrographic seizure activity.  The entire record is submitted for computer analysis.  The entire recording is visually reviewed, and the times identified by computer analysis as being spikes or seizures are reviewed again.    Compressed spectral analysis (CSA) is also performed on the activity recorded from each individual channel.  This is displayed as a power display of frequencies from 0 to 30 Hz over time.  The CSA is reviewed looking for asymmetries in power between homologous areas of the scalp, then compared with the original EEG recording.    Findings  The patient's background consists primarily of diffuse suppression.  There are rare instances of diffuse high amplitude slowing lasting less than 10 seconds.    Normal sleep architecture is not noted.    Hyperventilation and photic stimulation were not performed.  There is no response to noxious stimuli.    EKG demonstrates sinus rhythm.    Interpretation  This is an abnormal EEG due to a burst-suppression pattern without reactivity to noxious  stimulation.  This study is indicative of a severe encephalopathy.  These findings can be seen as effects of medications such as propofol.  When they are noted in the absence of sedative medications and in the presence of an anoxic brain injury, these findings are worrisome for a poor neurologic outcome.  Clinical correlation is required.

## 2022-05-16 PROBLEM — E87.20 LACTIC ACIDOSIS: Status: RESOLVED | Noted: 2022-01-01 | Resolved: 2022-01-01

## 2022-05-16 PROBLEM — Z71.89 GOALS OF CARE, COUNSELING/DISCUSSION: Status: ACTIVE | Noted: 2022-01-01

## 2022-05-16 PROBLEM — Z51.5 COMFORT MEASURES ONLY STATUS: Status: ACTIVE | Noted: 2022-01-01

## 2022-05-16 NOTE — SUBJECTIVE & OBJECTIVE
Interval History: no gag, cough, withdrawal to pain, corneal reflex. Called brother and discussed over the phone, will make DNR. He is on his way to a doctor's appointment and asked that I call him later for further discussion as well.    Review of Systems   Unable to perform ROS: Intubated   Objective:     Vital Signs (Most Recent):  Temp: 98.42 °F (36.9 °C) (05/16/22 1330)  Pulse: 92 (05/16/22 1330)  Resp: (!) 23 (05/16/22 1330)  BP: (!) 149/66 (05/16/22 1330)  SpO2: 96 % (05/16/22 1330)   Vital Signs (24h Range):  Temp:  [96.26 °F (35.7 °C)-99.14 °F (37.3 °C)] 98.42 °F (36.9 °C)  Pulse:  [80-92] 92  Resp:  [15-34] 23  SpO2:  [94 %-96 %] 96 %  BP: (138-200)/(58-80) 149/66     Weight: 98 kg (216 lb 0.8 oz)  Body mass index is 34.87 kg/m².    Intake/Output Summary (Last 24 hours) at 5/16/2022 1337  Last data filed at 5/16/2022 1300  Gross per 24 hour   Intake 3683.99 ml   Output 2080 ml   Net 1603.99 ml      Physical Exam  Vitals and nursing note reviewed.   Constitutional:       Appearance: She is obese. She is ill-appearing.   HENT:      Head: Normocephalic and atraumatic.      Nose: Nose normal.      Mouth/Throat:      Mouth: Mucous membranes are dry.   Eyes:      Comments: Pupils fixed   Cardiovascular:      Rate and Rhythm: Normal rate and regular rhythm.      Pulses: Normal pulses.      Heart sounds: Normal heart sounds.   Pulmonary:      Comments: vented  Abdominal:      General: Bowel sounds are normal.      Palpations: Abdomen is soft.   Musculoskeletal:      Cervical back: Neck supple.      Right lower leg: No edema.      Left lower leg: No edema.   Skin:     General: Skin is dry.      Coloration: Skin is pale.   Neurological:      Comments: Unresponsive, fixed pupils, no gag   Psychiatric:      Comments: Unable to assess       Significant Labs: All pertinent labs within the past 24 hours have been reviewed.    Significant Imaging: I have reviewed all pertinent imaging results/findings within the past 24  hours.

## 2022-05-16 NOTE — ASSESSMENT & PLAN NOTE
S/p arrest  Poor prognosis given extensive down time  Unresponsive off sedation, fixed pupils    -monitor for seizure activity  -therapeutic hypothermia complete; rewarming today  -avoid hypotension

## 2022-05-16 NOTE — PLAN OF CARE
Started rewarming at 0740, see flowsheet for hourly documentation. Patient with no reflexes/response to pain. Pupils fixed. Hypertensive through day, Cardene titrated, PO meds restarted thru OGT and PRN Hydralazine administered x1. NSR. ACVC 45/PEEP 5 SpO2>92%. Pena with 10-40cc/h. Repeat echo done at bedside, results pending. Trending BMP's, replacing lytes accordingly. Palliative cx placed. MD Katelynn spoke with POA today (Brother) and updated him on patient status, patient made DNR. Plan for possible hospice/withdrawal after caretaker visits, per MD. Continuing to monitor patient.

## 2022-05-16 NOTE — PLAN OF CARE
The sw spoke to the pt's brother Rob Macedo [phone] who states the pt was a resident at [facility] but was at [facility] in [place]. The pt has a long hx of Bipolar. He's currently with Ingrid from [facility] signing the consents. The pt will transition to IP hospice with [facility]. The sw left her name and contact info with him and encouraged him to call if he has any further questions or concerns. He requested Liya Francisco [phone] be allowed to see the family. The sw spoke to Ingrid and she states all the consents have been signed and she will call victor manuel Parson who's currently at the hospital. Ingrid will call Liya to see what time she's coming to say her goodbye's to the pt,so they can proceed with the process.        05/16/22 1800   Discharge Planning   Assessment Type Discharge Planning Brief Assessment   Resource/Environmental Concerns none   Support Systems Family members;Other (Comment)  ([facility] staff)   Current Living Arrangements other (see comments)   Care Facility Name [facility]   Patient/Family Anticipates Transition to other (see comments)  (transition to IP hospice with [facility])   Patient/Family Anticipated Services at Transition hospice care   DME Needed Upon Discharge  none   Discharge Plan A Comfort care/withdrawal

## 2022-05-16 NOTE — PLAN OF CARE
Patient on ventilator with documented vent settings. Alarms are set and functioning with adequate volumes. AMBU bag and mask are at bedside. Will continue to monitor and assess.

## 2022-05-16 NOTE — NURSING
K=3.3, Mg=1.9, phos=2.1 with last check. Attempted to notify Dr. Pace via secure chat with no response. Paged on call PA with no response. Dr. Brennan with [facility] notified. Will wait for next blood draw and recheck. No further orders at this time.

## 2022-05-16 NOTE — PROGRESS NOTES
[facility] Medicine  Progress Note    Patient Name: Austyn Pedersen  MRN: [id number]  Patient Class: IP- Inpatient   Admission Date: 5/15/2022  Length of Stay: 1 days  Attending Physician: Xavier Pace, *  Primary Care Provider: [facility] - drparikh (Inactive)        Subjective:     Principal Problem:Cardiopulmonary arrest        HPI:  Austyn Pedersen is a 80 yo female with a pmh of bipolar disorder, diastolic heart failure. Dementia, HTN, HLD, anemia. She presented to the ED via EMS in cardiopulmonary arrest. Per EMS report, she was at a psych facility and found on the ground from assumed unwitnessed fall out of bed. She was placed back into her bed at that time and was reported to be arousable and seemed at her baseline. She became unresponsive and pulseless shortly after so CPR was initiated with AED indication to shock, so patient was shocked 2-3 times. Patient was asystolic on EMS arrival with continued CPR and epinephrine administration. ROSC achieved after 30 minutes and the patient was intubated in the field. On arrival to the ED, she remained unresponsive off sedation with labile blood pressures ranging from low to extremely elevated. She was also bradycardic. She was initiated on propofol and hypothermia protocol in ED. CT head with anoxia noted. Lactic acid 7.8. Troponin normal.        Overview/Hospital Course:  No notes on file    Interval History: no gag, cough, withdrawal to pain, corneal reflex. Called brother and discussed over the phone, will make DNR. He is on his way to a doctor's appointment and asked that I call him later for further discussion as well.    Review of Systems   Unable to perform ROS: Intubated   Objective:     Vital Signs (Most Recent):  Temp: 98.42 °F (36.9 °C) (05/16/22 1330)  Pulse: 92 (05/16/22 1330)  Resp: (!) 23 (05/16/22 1330)  BP: (!) 149/66 (05/16/22 1330)  SpO2: 96 % (05/16/22 1330)   Vital Signs (24h Range):  Temp:  [96.26 °F (35.7  °C)-99.14 °F (37.3 °C)] 98.42 °F (36.9 °C)  Pulse:  [80-92] 92  Resp:  [15-34] 23  SpO2:  [94 %-96 %] 96 %  BP: (138-200)/(58-80) 149/66     Weight: 98 kg (216 lb 0.8 oz)  Body mass index is 34.87 kg/m².    Intake/Output Summary (Last 24 hours) at 5/16/2022 1337  Last data filed at 5/16/2022 1300  Gross per 24 hour   Intake 3683.99 ml   Output 2080 ml   Net 1603.99 ml      Physical Exam  Vitals and nursing note reviewed.   Constitutional:       Appearance: She is obese. She is ill-appearing.   HENT:      Head: Normocephalic and atraumatic.      Nose: Nose normal.      Mouth/Throat:      Mouth: Mucous membranes are dry.   Eyes:      Comments: Pupils fixed   Cardiovascular:      Rate and Rhythm: Normal rate and regular rhythm.      Pulses: Normal pulses.      Heart sounds: Normal heart sounds.   Pulmonary:      Comments: vented  Abdominal:      General: Bowel sounds are normal.      Palpations: Abdomen is soft.   Musculoskeletal:      Cervical back: Neck supple.      Right lower leg: No edema.      Left lower leg: No edema.   Skin:     General: Skin is dry.      Coloration: Skin is pale.   Neurological:      Comments: Unresponsive, fixed pupils, no gag   Psychiatric:      Comments: Unable to assess       Significant Labs: All pertinent labs within the past 24 hours have been reviewed.    Significant Imaging: I have reviewed all pertinent imaging results/findings within the past 24 hours.      Assessment/Plan:      * Cardiopulmonary arrest  Lactic Acidosis  Elevated Liver Enzymes  Leukocytosis  Anoxic Brain Injury  Hypertension      Unclear cause at this time  Normal troponin following extensive CPR    Drug screen clean  Remains unresponsive off sedation    -therapeutic hypothermia in progress; have started rewarming today around 7:40 a.m.  -has not needed any sedation  -consult pulmonology  -consult cardiology  -echo pending  -repeat lactic improving  -monitor liver function, improving  -monitor for seizure  activity  -zosyn for empiric coverage; have deescalated to Rocephin and azithromycin  -PRN hydralazine, avoid hypotension  -brother, Rob, is the contact      Goals of care, counseling/discussion  Advance Care Planning     Date: 05/16/2022    Code Status  In light of the patients advanced and life limiting illness,I engaged the the family in a conversation about the patient's preferences for care  at the very end of life. The patient wishes to have a natural, peaceful death.  Along those lines, the patient does not wish to have CPR or other invasive treatments performed when her heart and/or breathing stops. I communicated to the family that a DNR order would be placed in her medical record to reflect this preference.  I spent a total of 15 minutes engaging the patient in this advance care planning discussion.       [facility]  I engaged the family in a conversation about advance care planning and we specifically addressed what the goals of care would be moving forward, in light of the patient's change in clinical status, specifically cardiac arrest.  We did specifically address the patient's likely prognosis, which is poor.  We explored the patient's values and preferences for future care.  The family endorses that what is most important right now is to focus on quality of life, even if it means sacrificing a little time    Accordingly, we have decided that the best plan to meet the patient's goals includes continuing with treatment    I did explain the role for hospice care at this stage of the patient's illness, including its ability to help the patient live with the best quality of life possible; will discuss more upon call back.    I spent a total of 10 minutes engaging the patient in this advance care planning discussion.         - palliative consult      A-fib  S/p arrest  Initial EKG with Afib, now appears to be back in NSR  -repeat EKG pending  -consult cardiology      Anoxic brain injury  S/p arrest  Poor  prognosis given extensive down time  Unresponsive off sedation, fixed pupils    -monitor for seizure activity  -therapeutic hypothermia complete; rewarming today  -avoid hypotension          Leukocytosis  Stress induced s/p arrest  -monitor       Elevated liver enzymes  See above        VTE Risk Mitigation (From admission, onward)         Ordered     enoxaparin injection 40 mg  Daily         05/15/22 0426     IP VTE HIGH RISK PATIENT  Once         05/15/22 0426     Place sequential compression device  Until discontinued         05/15/22 0426     Place sequential compression device  Until discontinued         05/15/22 0421     Place sequential compression device  Until discontinued         05/15/22 0421                Discharge Planning   RANDY:      Code Status: DNR   Is the patient medically ready for discharge?:     Reason for patient still in hospital (select all that apply): Patient unstable               Critical care time spent on the evaluation and treatment of severe organ dysfunction, review of pertinent labs and imaging studies, discussions with consulting providers and discussions with patient/family: 40 minutes.      Xavier Pace MD  Department of Hospital Medicine   [facility] - Intensive Care

## 2022-05-16 NOTE — PROGRESS NOTES
Progress Note  [facility]    Attending: Marquis  Fellow: Anand  Admit Date: 5/15/2022  Today's Date: 05/16/2022      SUBJECTIVE:     Overnight patient off of sedation. No acute events    OBJECTIVE:     Vital Signs Trends/Hx Reviewed  Vitals:    05/16/22 1145 05/16/22 1200 05/16/22 1215 05/16/22 1230   BP: 139/63 (!) 164/67 138/62 (!) 145/65   BP Location:  Left arm  Left arm   Patient Position:  Lying  Lying   Pulse: 88 87 88 89   Resp: (!) 22 (!) 22 (!) 22 (!) 23   Temp: 97.52 °F (36.4 °C) 97.7 °F (36.5 °C) 97.88 °F (36.6 °C) 98.24 °F (36.8 °C)   TempSrc:  Core Esophageal  Core Esophageal   SpO2: 95% 95% 95% 96%   Weight:       Height:           Ventilator settings:   Vent Type: NKV-550 (5/16/2022 11:31 AM)    Vent Mode: A/C VC  Oxygen Concentration (%):  [45-50] 45  Resp Rate Total:  [27 br/min-31 br/min] 30 br/min  Vt Set:  [450 mL] 450 mL  PEEP/CPAP:  [5 cmH20] 5 cmH20  Mean Airway Pressure:  [7.2 cxL60-19.3 cmH20] 10.2 cmH20    Physical Exam:  Physical Exam  Vitals reviewed.   Constitutional:       Comments: intubated   HENT:      Head: Normocephalic and atraumatic.      Comments: NG tube in place  Cardiovascular:      Rate and Rhythm: Normal rate and regular rhythm.   Pulmonary:      Effort: No respiratory distress.      Comments: Coarse breath sounds  Musculoskeletal:      Comments: Stiffness of bilateral and lower extremities   Neurological:      Comments: No pupillary or corneal reflex  No withdrawal to pain or painful stimuli  No response to verbal stimuli         Laboratory:  No results for input(s): PH, PCO2, PO2, HCO3, POCSATURATED, BE in the last 24 hours.  Recent Labs   Lab 05/16/22  0415   WBC 23.24*   RBC 4.37   HGB 12.7   HCT 40.5      MCV 93   MCH 29.1   MCHC 31.4*     Recent Labs   Lab 05/16/22  0755      K 4.2  4.2      CO2 27   BUN 16   CREATININE 0.9   MG 1.8       Microbiology Data:   Microbiology Results (last 7 days)     Procedure  Component Value Units Date/Time    Culture, Respiratory with Gram Stain [[id number]] Collected: 05/15/22 0804    Order Status: Completed Specimen: Respiratory from Sputum Updated: 05/16/22 0957     Respiratory Culture Normal respiratory ronak     Gram Stain (Respiratory) <10 epithelial cells per low power field.     Gram Stain (Respiratory) Rare WBC's     Gram Stain (Respiratory) Rare Gram positive cocci    Blood culture [[id number]] Collected: 05/15/22 0624    Order Status: Completed Specimen: Blood Updated: 05/15/22 2115     Blood Culture, Routine No Growth to date    Blood culture [[id number]] Collected: 05/15/22 0620    Order Status: Completed Specimen: Blood Updated: 05/15/22 2115     Blood Culture, Routine No Growth to date    Urine culture [[id number]] Collected: 05/15/22 0245    Order Status: No result Specimen: Urine from Clean Catch Updated: 05/15/22 0537    Urine culture [[id number]]     Order Status: Completed Specimen: Urine, Catheterized            Chest Imaging:   No results found in the last 24 hours.      Infusions:     sodium chloride 0.9% Stopped (05/16/22 0837)    niCARdipine 15 mg/hr (05/16/22 1100)       Scheduled Medications:    acetaminophen  650 mg Per NG tube Q6H    [START ON 5/17/2022] azithromycin  250 mg Intravenous Q24H    azithromycin  500 mg Intravenous Once    cefTRIAXone (ROCEPHIN) IVPB  1 g Intravenous Q24H    enoxaparin  40 mg Subcutaneous Daily    mupirocin   Nasal BID    pantoprazole  40 mg Intravenous BID       PRN Medications:   acetaminophen, hydrALAZINE, magnesium sulfate IVPB, ondansetron, sodium chloride 0.9%, sodium chloride 0.9%    Problem List:   Patient Active Problem List   Diagnosis    Bipolar affective disorder    Dementia without behavioral disturbance    Essential hypertension    Debility    Chronic pain    Nursing home resident    Wheelchair dependent    Constipation due to opioid therapy    Chronic diastolic heart failure    Glaucoma    Syncope  "   Cardiopulmonary arrest    Hyperlipidemia    Anaclitic depression    Elevated liver enzymes    Leukocytosis    Lactic acidosis    Anoxic brain injury    A-fib       ASSESSMENT & RECOMMENDATIONS     Patient is a 81 year old female with PMHx of bipolar disorder, diastolic heart failure, HTN, anemia admitted for cardiopulmonary arrest after found down at psych facility for unknown amount of time. Patient CT head showing anoxic brain injury. EEG showing encephalopathy. Patient currently intubated. Prognosis guarded      Neuro/Psych  #anoxic brain injury  Patient is currently intubated  Not requiring sedation  CT head showing generalized cerebral volume loss; recommend short term repeat with MRI  EEG findings "severe encephalopathy. When they are noted in the absence of sedative medications and in the presence of an anoxic brain injury, these findings are worrisome for a poor neurologic outcome "  Continue to monitor    CV  #cardiopulmonary arrest likely 2/2 to hypoxemia  #troponinemia  - found down for unknown amount of time  - s/p ACLS with ROSC achieved after 30 minutes  - UDS negative  - cardiology consulted, elevated troponin 2/2 to AED shocks  - TTM initiated in ED  - on nicardipine drip  - currently being rewarmed        #CHF  - echo in 2019 EF 55% GI DD   - CXR bilateral interstitial and patchy airspace opacities  - repeat echo pending  - will attempt bedside echo today     #afib  - initial EKG with Afib currently in NSR with PVCs  - cardiology consulted        Pulm  #acute hypoxic Respiratory Failure  Vent settings as above  ABG on admit 7.4/35.8/25.4  CXR with bilateral interstitial and patchy airspace opacities  Continue to wean as tolerated  Continue Daily SAT/SBT           FEN/GI  Diet: NPO  Electrolytes are wnl  Stress ulcer prophylaxis: Protonix  Monitor for shock liver: LFTs elevated (trending down)  Fluid restriction   Keep Mg 2, K 4  Zofran prn for n/v      RENAL  UOP: 2912cc , In: 3475cc, net " -846cc since admit  Strict I&Os  Avoid renal toxic meds  BUN/Cr: 16/0.9, baseline 1.0  Continue to monitor renal status and urine output     #Hyperkalemia- resolved  - found to be hyperkalemic  - given insulin and lokelma for shifting  - currently hypokalemic to 3.2     Heme  H/H 12.7/40.5; stable  WBC 23.24  DVT prophylaxis: Lovenox     Endo  No known issues at this time.  Maintain glucose 140-180  MDSSI        ID  On zosyn for aspiration pna  Cxs are NGTD.   Zosyn switched to azithro and rocephin (D1)  Urinalysis: hematuria  Blood culture ngtd  urine culture pending  resp culture: rare WBC, rare Gram pos cocci        DVT PPx: lovenox  Diet: NPO  GI PPX: PPI BID  Deconditioning: PT/OT  Code Status: Full; patient has living will in media tab     Dispo  - GOC discussion, continuation of care       5/16/2022 Salvatore Lewis M.D., -II  [facility]  [facility]

## 2022-05-16 NOTE — PLAN OF CARE
Pt remains free from falls and injuries. ETT/vent, waller, PIVs, OGT, [place] remain. Temp maintained at 36 until rewarming planned for 0730 today. Cardene gtt infusing, titrated to maintain MAP<180. MIVF infusing. Pt remains unresponsive, no neuro changes noted, though pt is breathing over vent. VSS, no acute issues overnight. No family at bedside.

## 2022-05-16 NOTE — PHARMACY MED REC
"[facility]           Pharmacy  Admission Medication History     The home medication history was taken by Anju Lugo PharmD.      Medication history obtained from Medications listed below were obtained from: Patient/family and Medical records    Based on information gathered for medication list, you may go to "Admission" then "Reconcile Home Medications" tabs to review and/or act upon those items.      The home medication list has been updated by the Pharmacy department.    Please read ALL comments highlighted in yellow.    Please address this information as you see fit.     Feel free to contact us if you have any questions or require assistance.    Potential issues to be addressed PRIOR TO DISCHARGE  The listed medications were obtained from another facility ([facility]). The patient may not have been able to fill these prescriptions prior to this admission and may require new scripts upon discharge.     No current facility-administered medications on file prior to encounter.     Current Outpatient Medications on File Prior to Encounter   Medication Sig Dispense Refill    acetaminophen (TYLENOL) 325 MG tablet Take 650 mg by mouth every 4 (four) hours as needed for Pain.      aluminum-magnesium hydroxide-simethicone (MAALOX) 200-200-20 mg/5 mL Susp Take 30 mLs by mouth every 4 (four) hours as needed.      amitriptyline (ELAVIL) 50 MG tablet Take 50 mg by mouth nightly.      amLODIPine (NORVASC) 5 MG tablet Take 5 mg by mouth once daily.      amoxicillin-clavulanate 500-125mg (AUGMENTIN) 500-125 mg Tab Take 1 tablet by mouth 2 (two) times daily.      aspirin (ECOTRIN) 81 MG EC tablet Take 81 mg by mouth once daily.      atorvastatin (LIPITOR) 10 MG tablet Take 10 mg by mouth nightly.      calcium carbonate/vitamin D3 (CALCIUM 600 + D,3, ORAL) Take by mouth once daily.      carteolol (OCUPRESS) 1 % ophthalmic solution Place 1 drop into the right eye 2 (two) times daily.      " cyanocobalamin (VITAMIN B-12) 100 MCG tablet Take 100 mcg by mouth once daily.      haloperidoL (HALDOL) 5 MG tablet Take 2.5 mg by mouth every 6 (six) hours as needed (nonredirectable agitation).      haloperidoL (HALDOL) 5 MG tablet Take 10 mg by mouth 3 (three) times daily.      hydrALAZINE (APRESOLINE) 25 MG tablet Take 1 tablet (25 mg total) by mouth every 12 (twelve) hours. (Patient taking differently: Take 25 mg by mouth every 8 (eight) hours.) 60 tablet 11    latanoprost 0.005 % ophthalmic solution Place 1 drop into the right eye every evening.      magnesium hydroxide 400 mg/5 ml (MILK OF MAGNESIA) 400 mg/5 mL Susp Take 30 mLs by mouth daily as needed.      melatonin 3 mg Tab Take by mouth every evening.      memantine (NAMENDA) 5 MG Tab Take 5 mg by mouth 2 (two) times daily.      multivitamin (THERAGRAN) per tablet Take 1 tablet by mouth once daily.      NUEDEXTA 20-10 mg per capsule Take 1 capsule by mouth 2 (two) times daily.      OXcarbazepine (TRILEPTAL) 150 MG Tab Take 150 mg by mouth once daily. At 1300      OXcarbazepine (TRILEPTAL) 300 MG Tab Take 150 mg by mouth 2 (two) times daily.      propranoloL (INDERAL) 20 MG tablet Take 40 mg by mouth once daily.      sertraline (ZOLOFT) 50 MG tablet Take 50 mg by mouth once daily.      tiZANidine (ZANAFLEX) 4 MG tablet Take 4 mg by mouth 2 (two) times daily as needed.      traZODone (DESYREL) 100 MG tablet Take 100 mg by mouth nightly.      [DISCONTINUED] amLODIPine (NORVASC) 10 MG tablet Take 1 tablet (10 mg total) by mouth once daily. (Patient taking differently: Take 5 mg by mouth once daily.) 30 tablet 11    [DISCONTINUED] traZODone (DESYREL) 50 MG tablet Take 25 mg by mouth every evening.      [DISCONTINUED] amitriptyline (ELAVIL) 25 MG tablet Take 25 mg by mouth nightly as needed for Insomnia.      [DISCONTINUED] cetirizine (ZYRTEC) 10 MG tablet Take 10 mg by mouth every evening.      [DISCONTINUED] divalproex (DEPAKOTE) 250 MG  EC tablet Take 250 mg by mouth 2 (two) times daily.       [DISCONTINUED] docusate sodium (COLACE) 100 MG capsule Take 100 mg by mouth daily as needed for Constipation.      [DISCONTINUED] estradiol (ESTRACE) 0.01 % (0.1 mg/gram) vaginal cream Place 1 g vaginally twice a week. Monday and Thursday      [DISCONTINUED] fluticasone (FLONASE) 50 mcg/actuation nasal spray 1 spray by Each Nare route every evening.       [DISCONTINUED] HYDROcodone-acetaminophen (NORCO) 5-325 mg per tablet Take 1 tablet by mouth 3 (three) times daily as needed for Pain.      [DISCONTINUED] methylcellulose oral powder Take by mouth once daily. 30cc In orange juice daily      [DISCONTINUED] risperiDONE (RISPERDAL) 0.5 MG Tab Take 0.5 mg by mouth 2 (two) times daily.       [DISCONTINUED] risperiDONE (RISPERDAL) 2 MG tablet Take 2 mg by mouth 2 (two) times daily.         Please address this information as you see fit.  Feel free to contact us if you have any questions or require assistance.    Anju Lugo, PharmD  [phone]

## 2022-05-16 NOTE — CONSULTS
"Consult Note  Palliative Care    Consult Requested By: Xavier Pace, *  Reason for Consult: Goals of care    SUBJECTIVE:     History of Present Illness:  Disease Process: Advanced Cardiac Disease    Austyn Pedersen is a 82 yo female with a pmh of bipolar disorder, diastolic heart failure, unspecified dementia, HTN, HLD who was BIBEMS after a cardiac arrest in the field.     Per EMS pt was admitted at a psych facility and was found on the ground after an unwitnessed fall out of bed. She was placed back into her bed at that time and was reported to be arousable and seemed at her baseline.     Pt became unresponsive and pulseless shortly thereafter and CPR was initiated with AED, shocked 2-3 times. Patient was still in asystole on EMS arrival; continued CPR and epinephrine. ROSC was achieved after 30 minutes and the patient was intubated in the field.     On arrival to the ED, pt remained unresponsive off sedation with labile blood pressures and bradycardia. Absent brainstem reflexes and not withdrawing from pain. Started on propofol and TTM protocol in ED. CT head was concerning for diffuse anoxia. Lactic acid 7.8. Troponin normal.      Started on Zosyn for suspected aspiration during CPR and admitted to ICU for post arrest care. Primary team reports "grim prognosis but will complete [cooling] protocol". Cards and pulm consulted, supportive care recommended. Dark bloody OGT output reported.    Interval Hospital Course    5/16: Breathing over vent otherwise grave neuro exam with absent corneal, gag, cough reflexes. Pt's brother Rosales is acting decision maker and agreed to DNR status today. Given gravity of neuro prognosis does not wish to complete full TTM protocol and is per primary team is agreeable to transition to hospice care. Pt's caretaker is coming for last goodbyes but no further family visits are planned.    [facility] consulted and is obtaining consent from pt's brother Rosales to admit pt under care " of writer to the inpatient hospice service.    Past Medical History:   Diagnosis Date    Anemia     Bipolar disorder     Chronic diastolic heart failure     Chronic pain     Dementia     Depression     E coli bacteremia 9/21/2018    Extrapyramidal and movement disorder     Glaucoma     Hyperlipidemia     Hypertension     Pyelonephritis 9/21/2018    Right nephrolithiasis 9/21/2018    Scabies 05/05/2017     Past Surgical History:   Procedure Laterality Date    CYSTOSCOPY W/ URETERAL STENT PLACEMENT Right 9/27/2018    Procedure: CYSTOSCOPY, WITH URETERAL STENT INSERTION, possible retrograde pyelogram;  Surgeon: Laura Sorenson MD;  Location: [facility];  Service: Urology;  Laterality: Right;     History reviewed. No pertinent family history.  Social History     Tobacco Use    Smoking status: Unknown If Ever Smoked    Smokeless tobacco: Never Used     Mental Status: Non-responsive    ECOG Performance Status Grade: 4 - Completely disabled    Review of Systems:  Review of systems not obtained due to patient factors intubated, unresponsive.    Review of Symptoms    Symptom Assessment (ESAS 0-10 Scale)  Pain:  0  Dyspnea:  0  Anxiety:  0  Nausea:  0  Depression:  0  Anorexia:  0  Fatigue:  0  Insomnia:  0  Restlessness:  0  Agitation:  0  Unable to complete assessment due to Intubated     CAM / Delirium:  Negative  Constipation:  Negative  Diarrhea:  Negative    Bowel Management Plan (BMP):  No      Pain Assessment in Advanced Demential Scale (PAINAD)   Breathing - Independent of vocalization:  0  Negative vocalization:  0  Facial expression:  0  Body language:  0  Consolability:  0  Total:  0    ECOG Performance Status rdGrdrrdarddrderd:rd rd3rd Living Arrangements:  Lives in nursing home    Psychosocial/Cultural: Severe psych disorders, reportedly verbally abusive at baseline    Spiritual:  F - Kate and Belief:  Unable to assess  I - Importance:  Unable to assess    C - Community:  Unable to assess    A - Address in Care:   Unable to assess       Time-Based Charting:  Yes  Chart Review: 35 minutes  Face to Face: 15 minutes  Symptom Assessment: 5 minutes  Coordination of Care: 15 minutes  Discharge Planning: 10 minutes  Advance Care Plannin minutes  Goals of Care: 23 minutes    Total Time Spent: 116 minutes      Advance Care Planning   Advance Directives:   Living Will: Yes        Copy on chart: Yes    [place]: Yes    Agent's Name:  Rosales Macedo   Agent's Contact Number:  [phone]    Decision Making:  Patient unable to communicate due to disease severity/cognitive impairment         OBJECTIVE:     Physical Exam  Constitutional:       General: She is not in acute distress.     Appearance: She is obese. She is ill-appearing.      Interventions: She is intubated.   HENT:      Head: Normocephalic and atraumatic.      Mouth/Throat:      Mouth: Mucous membranes are moist.      Pharynx: Oropharynx is clear.   Eyes:      Comments: Pupils 2mm midline unreactive, no corneal reflex   Cardiovascular:      Rate and Rhythm: Regular rhythm. Tachycardia present.      Heart sounds: Normal heart sounds.   Pulmonary:      Effort: Tachypnea present. She is intubated.      Breath sounds: Examination of the right-lower field reveals decreased breath sounds. Examination of the left-lower field reveals decreased breath sounds. Decreased breath sounds present.   Abdominal:      General: Abdomen is flat. Bowel sounds are decreased.      Palpations: Abdomen is soft.   Musculoskeletal:         General: Normal range of motion.   Skin:     General: Skin is warm and dry.      Coloration: Skin is pale.   Neurological:      Mental Status: She is unresponsive.      Motor: Abnormal muscle tone (spastic globally with frequent myoclonus at wrists) present.       ASSESSMENT/PLAN:     Austyn Pedersen is a 80 yo female with a pmh of bipolar disorder, diastolic heart failure, unspecified dementia, HTN, HLD who was BIBEMS after a cardiac arrest in the field of indeterminate  etiology. Pt underwent CPR x at least 30 minutes and arrived with exam and imaging highly suspicious for severe anoxic brain injury. Started TTM. Breathing over vent otherwise absent brainstem reflexes and grim prognosis. Family wishes for palliative extubation and comfort measures. Palliative consulted for end of life care.    Family is weary and declines hospice enrollment at this time, will offer bereavement services regardless. Pt's caretaker is coming to bedside to visit this evening. After last goodbyes will premedicate and proceed with palliative extubation and full comfort measures. Prognosis assessed as 4-24 hours given intact respiratory drive but poor airway protection.    Recommendations:  Medical: no escalation of care; stop cardene drip after family visit  Symptom Management:  # End of life care  - palliative extubation after family visit  - premedicate for extubation with 4mg morphine x 1 and 2mg ativan x 1, followed by IV morphine 8mg/hr  Psychosocial: severe psych hx now permanently incapacicated  Legal: brotherRosales is legal NOK  Prognosis: several hours after extubation    Ramírez Lenz MD  Hospice and Palliative Medicine  Palliative Care Pager: [phone]    Advance Care Planning     Date: 05/16/2022    Code Status  In light of the patients advanced and life limiting illness,I engaged the the family in a conversation about the patient's preferences for care  at the very end of life. The patient wishes to have a natural, peaceful death.  Along those lines, the patient does not wish to have CPR or other invasive treatments performed when her heart and/or breathing stops. I communicated to the family that a DNR order would be placed in her medical record to reflect this preference.  I spent a total of 36 minutes engaging the patient in this advance care planning discussion.       [facility]  I engaged the family in a conversation about advance care planning and we specifically addressed what the goals  of care would be moving forward, in light of the patient's change in clinical status, specifically severe anoxic brain injury.  We did specifically address the patient's likely prognosis, which is grave.  We explored the patient's values and preferences for future care.  The family endorses that what is most important right now is to focus on symptom/pain control and quality of life, even if it means sacrificing a little time    Accordingly, we have decided that the best plan to meet the patient's goals includes enrolling in hospice care on the hospital medicine service.    I did explain the role for hospice care at this stage of the patient's illness, including its ability to help the patient live with the best quality of life possible.  We will be making a hospice referral per family request.    I spent a total of 36 minutes engaging the patient in this advance care planning discussion.

## 2022-05-16 NOTE — ASSESSMENT & PLAN NOTE
Advance Care Planning     Date: 05/16/2022    Code Status  In light of the patients advanced and life limiting illness,I engaged the the family in a conversation about the patient's preferences for care  at the very end of life. The patient wishes to have a natural, peaceful death.  Along those lines, the patient does not wish to have CPR or other invasive treatments performed when her heart and/or breathing stops. I communicated to the family that a DNR order would be placed in her medical record to reflect this preference.  I spent a total of 15 minutes engaging the patient in this advance care planning discussion.       [facility]  I engaged the family in a conversation about advance care planning and we specifically addressed what the goals of care would be moving forward, in light of the patient's change in clinical status, specifically cardiac arrest.  We did specifically address the patient's likely prognosis, which is poor.  We explored the patient's values and preferences for future care.  The family endorses that what is most important right now is to focus on quality of life, even if it means sacrificing a little time    Accordingly, we have decided that the best plan to meet the patient's goals includes continuing with treatment    I did explain the role for hospice care at this stage of the patient's illness, including its ability to help the patient live with the best quality of life possible; will discuss more upon call back.    I spent a total of 10 minutes engaging the patient in this advance care planning discussion.          - palliative consult

## 2022-05-16 NOTE — ASSESSMENT & PLAN NOTE
Lactic Acidosis  Elevated Liver Enzymes  Leukocytosis  Anoxic Brain Injury  Hypertension      Unclear cause at this time  Normal troponin following extensive CPR    Drug screen clean  Remains unresponsive off sedation    -therapeutic hypothermia in progress; have started rewarming today around 7:40 a.m.  -has not needed any sedation  -consult pulmonology  -consult cardiology  -echo pending  -repeat lactic improving  -monitor liver function, improving  -monitor for seizure activity  -zosyn for empiric coverage; have deescalated to Rocephin and azithromycin  -PRN hydralazine, avoid hypotension  -brotherRob, is the contact

## 2022-05-16 NOTE — PROGRESS NOTES
Pt with Brendan score of 12- spoke with nurse who reports sacrum and heels intact and pressure injury prevention interventions in place.

## 2022-05-17 LAB
BACTERIA SPEC AEROBE CULT: NORMAL
BACTERIA SPEC AEROBE CULT: NORMAL
GRAM STN SPEC: NORMAL
NSE SERPL-MCNC: 35 NG/ML

## 2022-05-20 LAB
BACTERIA BLD CULT: NORMAL
BACTERIA BLD CULT: NORMAL

## 2022-06-10 NOTE — DISCHARGE SUMMARY
"[facility] - Intensive Care  Palliative Medicine  Discharge Summary     Patient Name: Austyn Pedersen  MRN: [id number]  Admission Date: 5/16/2022  Hospital Length of Stay: 1 days  Discharge Date and Time: 5/16/2022 10:00 PM  Attending Physician: Ramírez Lenz Jr, MD   Discharging Provider: Ramírez Lenz Jr, MD  Primary Care Provider: Richard King MD [facility] (Inactive)     HPI:   Austyn Pedersen is a 82 yo female with a pmh of bipolar disorder, diastolic heart failure, unspecified dementia, HTN, HLD who was BIBEMS after a cardiac arrest in the field.      Per EMS pt was admitted at a psych facility and was found on the ground after an unwitnessed fall out of bed. She was placed back into her bed at that time and was reported to be arousable and seemed at her baseline.      Pt became unresponsive and pulseless shortly thereafter and CPR was initiated with AED, shocked 2-3 times. Patient was still in asystole on EMS arrival; continued CPR and epinephrine. ROSC was achieved after 30 minutes and the patient was intubated in the field.      On arrival to the ED, pt remained unresponsive off sedation with labile blood pressures and bradycardia. Absent brainstem reflexes and not withdrawing from pain. Started on propofol and TTM protocol in ED. CT head was concerning for diffuse anoxia. Lactic acid 7.8. Troponin normal.       Started on Zosyn for suspected aspiration during CPR and admitted to ICU for post arrest care. Primary team reports "grim prognosis but will complete [cooling] protocol". Cards and pulm consulted, supportive care recommended. Dark bloody OGT output reported.     Interval Hospital Course     5/16: Breathing over vent otherwise grave neuro exam with absent corneal, gag, cough reflexes.      Pt's brother Rosales is acting decision maker and agreed to DNR status today. Given gravity of neuro prognosis does not wish to complete full TTM protocol and is per primary team is agreeable to transition to hospice care. Don " has consented to enroll pt under the care of [facility] and she has been readmitted to our inpatient hospice service.      Hospital Course:   Family members arrived for last goodbyes and pt was terminally extubated at . Started morphine gtt and pt entered cardiopulmonary arrest at . Family notified and emotional support provided.     Goals of Care Treatment Preferences:  Code Status: DNR     Health care agent: Rosales Macedo  [facility] agent number: [phone]     Living Will: Yes  LaPOST: Yes  What is most important right now is to focus on symptom/pain control, quality of life, even if it means sacrificing a little time.  Accordingly, we have decided that the best plan to meet the patient's goals includes enrolling in hospice care.        Consults:      No new Assessment & Plan notes have been filed under this hospital service since the last note was generated.  Service: Palliative Medicine               Final Active Diagnoses:     Diagnosis Date Noted POA    Comfort measures only status [Z51.5] 2022 Not Applicable       Problems Resolved During this Admission:         Discharged Condition:      Disposition:      Follow Up:  Patient Instructions:   No discharge procedures on file.     Significant Diagnostic Studies: none           Pending Diagnostic Studies:      None          Medications:  None     Indwelling Lines/Drains at time of discharge:         Lines/Drains/Airways                                 Drain  Duration                                  Urethral Catheter 05/15/22 0252 16 Fr. 2 days                                           Airway  Duration                                  Airway - Non-Surgical Endotracheal Tube -- days                              Time spent on the discharge of patient: 21 minutes  Patient was seen and examined on the date of discharge and determined to be suitable for discharge.     Ramírez Lenz Jr, MD  Department of Palliative  [facility] - Intensive Care

## 2022-06-10 NOTE — DISCHARGE SUMMARY
"[facility] - Intensive Care  Palliative Medicine  Discharge Summary     Patient Name: Austyn Pedersen  MRN: [id number]  Admission Date: 5/16/2022  Hospital Length of Stay: 1 days  Discharge Date and Time: 5/16/2022 10:00 PM  Attending Physician: Ramírez Lenz Jr, MD   Discharging Provider: Ramírez Lenz Jr, MD  Primary Care Provider: [email or web address] (Inactive)     HPI:   Austyn Pedersen is a 80 yo female with a pmh of bipolar disorder, diastolic heart failure, unspecified dementia, HTN, HLD who was BIBEMS after a cardiac arrest in the field.      Per EMS pt was admitted at a psych facility and was found on the ground after an unwitnessed fall out of bed. She was placed back into her bed at that time and was reported to be arousable and seemed at her baseline.      Pt became unresponsive and pulseless shortly thereafter and CPR was initiated with AED, shocked 2-3 times. Patient was still in asystole on EMS arrival; continued CPR and epinephrine. ROSC was achieved after 30 minutes and the patient was intubated in the field.      On arrival to the ED, pt remained unresponsive off sedation with labile blood pressures and bradycardia. Absent brainstem reflexes and not withdrawing from pain. Started on propofol and TTM protocol in ED. CT head was concerning for diffuse anoxia. Lactic acid 7.8. Troponin normal.       Started on Zosyn for suspected aspiration during CPR and admitted to ICU for post arrest care. Primary team reports "grim prognosis but will complete [cooling] protocol". Cards and pulm consulted, supportive care recommended. Dark bloody OGT output reported.     Interval Hospital Course     5/16: Breathing over vent otherwise grave neuro exam with absent corneal, gag, cough reflexes.      Pt's brother Rosales is acting decision maker and agreed to DNR status today. Given gravity of neuro prognosis does not wish to complete full TTM protocol and is per primary team is agreeable to transition to hospice care. Don " has consented to enroll pt under the care of [facility] and she has been readmitted to our inpatient hospice service.      Hospital Course:   Family members arrived for last goodbyes and pt was terminally extubated at . Started morphine gtt and pt entered cardiopulmonary arrest at . Family notified and emotional support provided.     Goals of Care Treatment Preferences:  Code Status: DNR     Health care agent: Rosales Macedo  [facility] agent number: [phone]     Living Will: Yes  LaPOST: Yes  What is most important right now is to focus on symptom/pain control, quality of life, even if it means sacrificing a little time.  Accordingly, we have decided that the best plan to meet the patient's goals includes enrolling in hospice care.        Consults:      No new Assessment & Plan notes have been filed under this hospital service since the last note was generated.  Service: Palliative Medicine           Final Active Diagnoses:     Diagnosis Date Noted POA    Comfort measures only status [Z51.5] 2022 Not Applicable       Problems Resolved During this Admission:         Discharged Condition:      Disposition:      Follow Up:  Patient Instructions:   No discharge procedures on file.     Significant Diagnostic Studies: none         Pending Diagnostic Studies:      None          Medications:  None     Indwelling Lines/Drains at time of discharge:       Lines/Drains/Airways              Drain  Duration                          Urethral Catheter 05/15/22 0252 16 Fr. 2 days             Airway  Duration                          Airway - Non-Surgical Endotracheal Tube -- days                     Time spent on the discharge of patient: 21 minutes  Patient was seen and examined on the date of discharge and determined to be suitable for discharge.     Ramírez Lenz Jr, MD  Department of Palliative Medicine  [facility]

## (undated) DEVICE — SUPPORT ULNA NERVE PROTECTOR

## (undated) DEVICE — SEE MEDLINE ITEM 152622

## (undated) DEVICE — SET IRR URLGY 2LINE UNIV SPIKE

## (undated) DEVICE — TRAY FOLEY 16FR INFECTION CONT

## (undated) DEVICE — CATH URTRL OPEN END STR TIP 5F

## (undated) DEVICE — SEE MEDLINE ITEM 154981

## (undated) DEVICE — GAUZE SPONGE 4X4 12PLY

## (undated) DEVICE — JELLY LUBRICANT STERILE 4 OZ

## (undated) DEVICE — SYR ONLY LUER LOCK 20CC

## (undated) DEVICE — SEE MEDLINE ITEM 157117

## (undated) DEVICE — SEE MEDLINE ITEM 157185

## (undated) DEVICE — GLOVE SURGICAL LATEX SZ 6.5

## (undated) DEVICE — GUIDEWIRE NITINOL HYBRID 150CM

## (undated) DEVICE — UNDERGLOVES BIOGEL PI SZ 7 LF